# Patient Record
Sex: FEMALE | Race: OTHER | NOT HISPANIC OR LATINO | Employment: FULL TIME | RURAL
[De-identification: names, ages, dates, MRNs, and addresses within clinical notes are randomized per-mention and may not be internally consistent; named-entity substitution may affect disease eponyms.]

---

## 2020-05-31 ENCOUNTER — HISTORICAL (OUTPATIENT)
Dept: ADMINISTRATIVE | Facility: HOSPITAL | Age: 40
End: 2020-05-31

## 2021-05-04 RX ORDER — ERTUGLIFLOZIN 5 MG/1
5 TABLET, FILM COATED ORAL DAILY
COMMUNITY
End: 2021-09-22

## 2021-05-04 RX ORDER — LIRAGLUTIDE 6 MG/ML
1.8 INJECTION SUBCUTANEOUS DAILY
COMMUNITY
End: 2021-11-09

## 2021-05-04 RX ORDER — IBUPROFEN 600 MG/1
600 TABLET ORAL 3 TIMES DAILY
COMMUNITY
End: 2023-02-22

## 2021-05-04 RX ORDER — LOSARTAN POTASSIUM 50 MG/1
50 TABLET ORAL DAILY
COMMUNITY
End: 2021-09-22

## 2021-09-22 ENCOUNTER — OFFICE VISIT (OUTPATIENT)
Dept: DIABETES SERVICES | Facility: CLINIC | Age: 41
End: 2021-09-22
Payer: COMMERCIAL

## 2021-09-22 VITALS
BODY MASS INDEX: 47.09 KG/M2 | DIASTOLIC BLOOD PRESSURE: 106 MMHG | HEIGHT: 66 IN | OXYGEN SATURATION: 98 % | WEIGHT: 293 LBS | RESPIRATION RATE: 16 BRPM | SYSTOLIC BLOOD PRESSURE: 136 MMHG | HEART RATE: 93 BPM

## 2021-09-22 DIAGNOSIS — E11.9 TYPE 2 DIABETES MELLITUS WITHOUT COMPLICATION, WITHOUT LONG-TERM CURRENT USE OF INSULIN: Primary | ICD-10-CM

## 2021-09-22 DIAGNOSIS — I10 ESSENTIAL HYPERTENSION: ICD-10-CM

## 2021-09-22 LAB
GLUCOSE SERPL-MCNC: 204 MG/DL (ref 70–110)
HBA1C MFR BLD: 8.9 % (ref 4.5–6.6)

## 2021-09-22 PROCEDURE — 82962 GLUCOSE BLOOD TEST: CPT | Mod: PBBFAC | Performed by: NURSE PRACTITIONER

## 2021-09-22 PROCEDURE — 99213 PR OFFICE/OUTPT VISIT, EST, LEVL III, 20-29 MIN: ICD-10-PCS | Mod: S$PBB,,, | Performed by: NURSE PRACTITIONER

## 2021-09-22 PROCEDURE — 99213 OFFICE O/P EST LOW 20 MIN: CPT | Mod: S$PBB,,, | Performed by: NURSE PRACTITIONER

## 2021-09-22 PROCEDURE — 83036 HEMOGLOBIN GLYCOSYLATED A1C: CPT | Mod: PBBFAC | Performed by: NURSE PRACTITIONER

## 2021-09-22 PROCEDURE — 99214 OFFICE O/P EST MOD 30 MIN: CPT | Mod: PBBFAC | Performed by: NURSE PRACTITIONER

## 2021-09-22 RX ORDER — EMPAGLIFLOZIN 25 MG/1
25 TABLET, FILM COATED ORAL DAILY
Qty: 90 TABLET | Refills: 1 | Status: SHIPPED | OUTPATIENT
Start: 2021-09-22 | End: 2021-12-22

## 2021-09-22 RX ORDER — FLUCONAZOLE 100 MG/1
TABLET ORAL
Qty: 4 TABLET | Refills: 0 | Status: SHIPPED | OUTPATIENT
Start: 2021-09-22 | End: 2021-12-22

## 2021-09-22 RX ORDER — LOSARTAN POTASSIUM 100 MG/1
100 TABLET ORAL DAILY
Qty: 90 TABLET | Refills: 3 | Status: SHIPPED | OUTPATIENT
Start: 2021-09-22 | End: 2022-02-17 | Stop reason: SDUPTHER

## 2021-09-23 RX ORDER — ATORVASTATIN CALCIUM 20 MG/1
20 TABLET, FILM COATED ORAL DAILY
Qty: 90 TABLET | Refills: 3 | Status: SHIPPED | OUTPATIENT
Start: 2021-09-23 | End: 2021-12-22 | Stop reason: SDUPTHER

## 2021-12-22 ENCOUNTER — OFFICE VISIT (OUTPATIENT)
Dept: DIABETES SERVICES | Facility: CLINIC | Age: 41
End: 2021-12-22
Payer: COMMERCIAL

## 2021-12-22 VITALS
SYSTOLIC BLOOD PRESSURE: 144 MMHG | RESPIRATION RATE: 16 BRPM | BODY MASS INDEX: 47.09 KG/M2 | HEART RATE: 97 BPM | DIASTOLIC BLOOD PRESSURE: 102 MMHG | OXYGEN SATURATION: 98 % | WEIGHT: 293 LBS | HEIGHT: 66 IN

## 2021-12-22 DIAGNOSIS — I10 PRIMARY HYPERTENSION: ICD-10-CM

## 2021-12-22 DIAGNOSIS — E11.65 TYPE 2 DIABETES MELLITUS WITH HYPERGLYCEMIA, WITHOUT LONG-TERM CURRENT USE OF INSULIN: Primary | ICD-10-CM

## 2021-12-22 DIAGNOSIS — E78.5 HYPERLIPIDEMIA, UNSPECIFIED HYPERLIPIDEMIA TYPE: ICD-10-CM

## 2021-12-22 DIAGNOSIS — F32.A DEPRESSION, UNSPECIFIED DEPRESSION TYPE: ICD-10-CM

## 2021-12-22 LAB
GLUCOSE SERPL-MCNC: 277 MG/DL (ref 70–110)
HBA1C MFR BLD: 10.7 % (ref 4.5–6.6)

## 2021-12-22 PROCEDURE — 3061F PR NEG MICROALBUMINURIA RESULT DOCUMENTED/REVIEW: ICD-10-PCS | Mod: CPTII,,, | Performed by: NURSE PRACTITIONER

## 2021-12-22 PROCEDURE — 3061F NEG MICROALBUMINURIA REV: CPT | Mod: CPTII,,, | Performed by: NURSE PRACTITIONER

## 2021-12-22 PROCEDURE — 4010F PR ACE/ARB THEARPY RXD/TAKEN: ICD-10-PCS | Mod: CPTII,,, | Performed by: NURSE PRACTITIONER

## 2021-12-22 PROCEDURE — 3066F PR DOCUMENTATION OF TREATMENT FOR NEPHROPATHY: ICD-10-PCS | Mod: CPTII,,, | Performed by: NURSE PRACTITIONER

## 2021-12-22 PROCEDURE — 99214 PR OFFICE/OUTPT VISIT, EST, LEVL IV, 30-39 MIN: ICD-10-PCS | Mod: S$PBB,,, | Performed by: NURSE PRACTITIONER

## 2021-12-22 PROCEDURE — 99214 OFFICE O/P EST MOD 30 MIN: CPT | Mod: PBBFAC | Performed by: NURSE PRACTITIONER

## 2021-12-22 PROCEDURE — 3066F NEPHROPATHY DOC TX: CPT | Mod: CPTII,,, | Performed by: NURSE PRACTITIONER

## 2021-12-22 PROCEDURE — 99214 OFFICE O/P EST MOD 30 MIN: CPT | Mod: S$PBB,,, | Performed by: NURSE PRACTITIONER

## 2021-12-22 PROCEDURE — 83036 HEMOGLOBIN GLYCOSYLATED A1C: CPT | Mod: PBBFAC | Performed by: NURSE PRACTITIONER

## 2021-12-22 PROCEDURE — 4010F ACE/ARB THERAPY RXD/TAKEN: CPT | Mod: CPTII,,, | Performed by: NURSE PRACTITIONER

## 2021-12-22 PROCEDURE — 82962 GLUCOSE BLOOD TEST: CPT | Mod: PBBFAC | Performed by: NURSE PRACTITIONER

## 2021-12-22 RX ORDER — LIRAGLUTIDE 6 MG/ML
INJECTION SUBCUTANEOUS
Qty: 27 ML | Refills: 3 | Status: SHIPPED | OUTPATIENT
Start: 2021-12-22 | End: 2022-02-17

## 2021-12-22 RX ORDER — ATORVASTATIN CALCIUM 20 MG/1
20 TABLET, FILM COATED ORAL DAILY
Qty: 90 TABLET | Refills: 3 | Status: SHIPPED | OUTPATIENT
Start: 2021-12-22 | End: 2022-02-17 | Stop reason: SDUPTHER

## 2021-12-22 RX ORDER — INSULIN GLARGINE 100 [IU]/ML
INJECTION, SOLUTION SUBCUTANEOUS
Qty: 15 ML | Refills: 1 | Status: SHIPPED | OUTPATIENT
Start: 2021-12-22 | End: 2022-02-17

## 2021-12-22 RX ORDER — BUSPIRONE HYDROCHLORIDE 10 MG/1
10 TABLET ORAL 3 TIMES DAILY
Qty: 90 TABLET | Refills: 11 | Status: SHIPPED | OUTPATIENT
Start: 2021-12-22 | End: 2022-02-17 | Stop reason: SDUPTHER

## 2021-12-22 RX ORDER — ALCOHOL ANTISEPTIC PADS
PADS, MEDICATED (EA) TOPICAL
Qty: 200 EACH | Refills: 3 | Status: SHIPPED | OUTPATIENT
Start: 2021-12-22 | End: 2022-02-14 | Stop reason: SDUPTHER

## 2022-02-14 ENCOUNTER — OFFICE VISIT (OUTPATIENT)
Dept: DIABETES SERVICES | Facility: CLINIC | Age: 42
End: 2022-02-14
Payer: COMMERCIAL

## 2022-02-14 VITALS
RESPIRATION RATE: 16 BRPM | OXYGEN SATURATION: 98 % | SYSTOLIC BLOOD PRESSURE: 146 MMHG | HEART RATE: 96 BPM | BODY MASS INDEX: 47.09 KG/M2 | DIASTOLIC BLOOD PRESSURE: 96 MMHG | HEIGHT: 66 IN | WEIGHT: 293 LBS

## 2022-02-14 DIAGNOSIS — E78.5 HYPERLIPIDEMIA, UNSPECIFIED HYPERLIPIDEMIA TYPE: ICD-10-CM

## 2022-02-14 DIAGNOSIS — E11.9 TYPE 2 DIABETES MELLITUS WITHOUT COMPLICATION, WITH LONG-TERM CURRENT USE OF INSULIN: Primary | ICD-10-CM

## 2022-02-14 DIAGNOSIS — Z79.4 TYPE 2 DIABETES MELLITUS WITHOUT COMPLICATION, WITH LONG-TERM CURRENT USE OF INSULIN: Primary | ICD-10-CM

## 2022-02-14 DIAGNOSIS — I10 PRIMARY HYPERTENSION: ICD-10-CM

## 2022-02-14 DIAGNOSIS — E11.65 TYPE 2 DIABETES MELLITUS WITH HYPERGLYCEMIA, WITHOUT LONG-TERM CURRENT USE OF INSULIN: ICD-10-CM

## 2022-02-14 LAB — GLUCOSE SERPL-MCNC: 295 MG/DL (ref 70–110)

## 2022-02-14 PROCEDURE — 82962 GLUCOSE BLOOD TEST: CPT | Mod: PBBFAC | Performed by: NURSE PRACTITIONER

## 2022-02-14 PROCEDURE — 1160F RVW MEDS BY RX/DR IN RCRD: CPT | Mod: CPTII,,, | Performed by: NURSE PRACTITIONER

## 2022-02-14 PROCEDURE — 3008F BODY MASS INDEX DOCD: CPT | Mod: CPTII,,, | Performed by: NURSE PRACTITIONER

## 2022-02-14 PROCEDURE — 99214 OFFICE O/P EST MOD 30 MIN: CPT | Mod: S$PBB,,, | Performed by: NURSE PRACTITIONER

## 2022-02-14 PROCEDURE — 1160F PR REVIEW ALL MEDS BY PRESCRIBER/CLIN PHARMACIST DOCUMENTED: ICD-10-PCS | Mod: CPTII,,, | Performed by: NURSE PRACTITIONER

## 2022-02-14 PROCEDURE — 3080F DIAST BP >= 90 MM HG: CPT | Mod: CPTII,,, | Performed by: NURSE PRACTITIONER

## 2022-02-14 PROCEDURE — 99214 OFFICE O/P EST MOD 30 MIN: CPT | Mod: PBBFAC | Performed by: NURSE PRACTITIONER

## 2022-02-14 PROCEDURE — 1159F PR MEDICATION LIST DOCUMENTED IN MEDICAL RECORD: ICD-10-PCS | Mod: CPTII,,, | Performed by: NURSE PRACTITIONER

## 2022-02-14 PROCEDURE — 1159F MED LIST DOCD IN RCRD: CPT | Mod: CPTII,,, | Performed by: NURSE PRACTITIONER

## 2022-02-14 PROCEDURE — 3077F SYST BP >= 140 MM HG: CPT | Mod: CPTII,,, | Performed by: NURSE PRACTITIONER

## 2022-02-14 PROCEDURE — 3077F PR MOST RECENT SYSTOLIC BLOOD PRESSURE >= 140 MM HG: ICD-10-PCS | Mod: CPTII,,, | Performed by: NURSE PRACTITIONER

## 2022-02-14 PROCEDURE — 3080F PR MOST RECENT DIASTOLIC BLOOD PRESSURE >= 90 MM HG: ICD-10-PCS | Mod: CPTII,,, | Performed by: NURSE PRACTITIONER

## 2022-02-14 PROCEDURE — 99214 PR OFFICE/OUTPT VISIT, EST, LEVL IV, 30-39 MIN: ICD-10-PCS | Mod: S$PBB,,, | Performed by: NURSE PRACTITIONER

## 2022-02-14 PROCEDURE — 3008F PR BODY MASS INDEX (BMI) DOCUMENTED: ICD-10-PCS | Mod: CPTII,,, | Performed by: NURSE PRACTITIONER

## 2022-02-14 RX ORDER — ALCOHOL ANTISEPTIC PADS
PADS, MEDICATED (EA) TOPICAL
Qty: 200 EACH | Refills: 3 | Status: SHIPPED | OUTPATIENT
Start: 2022-02-14 | End: 2023-10-19 | Stop reason: SDUPTHER

## 2022-02-14 RX ORDER — AMLODIPINE BESYLATE 5 MG/1
5 TABLET ORAL DAILY
Qty: 90 TABLET | Refills: 1 | Status: SHIPPED | OUTPATIENT
Start: 2022-02-14 | End: 2022-02-17 | Stop reason: SDUPTHER

## 2022-02-14 RX ORDER — GLIPIZIDE 10 MG/1
10 TABLET, FILM COATED, EXTENDED RELEASE ORAL
Qty: 90 TABLET | Refills: 1 | Status: SHIPPED | OUTPATIENT
Start: 2022-02-14 | End: 2022-02-14

## 2022-02-14 NOTE — PROGRESS NOTES
Subjective:       Patient ID: Ashly Trent is a 41 y.o. female.    Chief Complaint: No chief complaint on file.    Here for follow up.  Advised to follow up in 6 weeks as we changed meds due to formulary as well as increased losartan due to poorly controlled blood pressure.    Review of Systems   Constitutional: Negative for activity change, appetite change, diaphoresis and fatigue.   HENT: Negative for nasal congestion, facial swelling and sinus pressure/congestion.    Eyes: Negative for visual disturbance.   Respiratory: Negative for shortness of breath and wheezing.    Cardiovascular: Negative for chest pain and leg swelling.   Gastrointestinal: Negative for constipation, diarrhea, nausea and vomiting.   Endocrine: Negative for polydipsia, polyphagia and polyuria.   Genitourinary: Negative for dysuria, frequency and urgency.   Musculoskeletal: Negative for gait problem and myalgias.   Integumentary:  Negative for color change, rash and wound.   Neurological: Negative for dizziness, syncope, weakness, headaches, disturbances in coordination and coordination difficulties.   Hematological: Does not bruise/bleed easily.   Psychiatric/Behavioral: Negative for self-injury, sleep disturbance and suicidal ideas. The patient is not nervous/anxious.          Objective:      Physical Exam  Vitals and nursing note reviewed.   Constitutional:       Appearance: Normal appearance.   HENT:      Head: Normocephalic.   Cardiovascular:      Rate and Rhythm: Normal rate.      Pulses:           Dorsalis pedis pulses are 3+ on the right side and 3+ on the left side.        Posterior tibial pulses are 3+ on the right side and 3+ on the left side.   Pulmonary:      Effort: Pulmonary effort is normal.   Musculoskeletal:         General: Normal range of motion.      Right foot: Normal range of motion. No deformity.      Left foot: Normal range of motion. No deformity.   Feet:      Right foot:      Skin integrity: Skin integrity normal.  No ulcer or callus.      Toenail Condition: Right toenails are normal.      Left foot:      Skin integrity: Skin integrity normal. No ulcer or callus.      Toenail Condition: Left toenails are normal.   Skin:     General: Skin is warm and dry.   Neurological:      General: No focal deficit present.      Mental Status: She is alert and oriented to person, place, and time.   Psychiatric:         Mood and Affect: Mood normal.         Behavior: Behavior normal.         Thought Content: Thought content normal.         Judgment: Judgment normal.         Assessment:       Problem List Items Addressed This Visit        Cardiac/Vascular    HTN (hypertension)    Hyperlipidemia       Endocrine    Diabetes mellitus, type 2 - Primary          Plan:       Problem List Items Addressed This Visit        Cardiac/Vascular    HTN (hypertension)    Hyperlipidemia       Endocrine    Diabetes mellitus, type 2 - Primary             We will get appt with [facility] provider to establish and obtain insulin and ozempic in the place of [facility].

## 2022-02-14 NOTE — PATIENT INSTRUCTIONS
Continue to monitor and document blood pressure and glucose as directed.      Advised to continue to monitor and document blood pressure at home, bring in log to next visit.  DASH encouraged.  Take all medications as directed.   Pt is advised to monitor and document glucose fasting when you wake up before you eat and 2 hours after meal and bring in meter to next visit.      Ensure to take medications as directed.      Follow diabetic diet as directed.      Work to achieve normal body weight.     Ensure to exercise 4-5 times per week for 20 minutes. Snacks with 0-5 grams carbs   Hard Boiled Egg   Crystal Light, Vitamin Water, Powerade Zero   Herbal tea, unsweetened   8 oz unsweetened almond milk   2 tsp peanut butter on celery   ½ cup sugar-free Jell-O   1 sugar-free popsicle   Non starchy vegetables such as carrots or celery sticks with lowfat dressing   ½ oz lowfat cheese or string cheese   1 closed handful of nuts or tbsp of seeds, unsalted    Snacks with 15 gram carbs  . 1 small piece of fruit or . banana or . cup light canned fruit  . 3 aftab cracker squares  . 3 cups popcorn  . 5 Vanilla Wafers  . 1/2 cup low fat, no added sugar ice cream or frozen yogurt  . 1/2 turkey, ham, or chicken sandwich  . 1.2 cup fruit with 1/2 cup of cottage cheese  . 4-6 unsalted wheat crackers with 1 oz low fat cheese or 1 tbsp peanut butter  . 30 goldfish crackers  . 7-8 mini rice cakes  . 1/3 cup hummus dip with raw vegetables  . 1/2 whole wheat lorraine, 1 tbsp hummus  . Mini pizza (. whole wheat English muffin, low-fat cheese, tomato sauce)  . 100 calorie snack pack  . 4-6 oz light yogurt  . 1/2 cup sugar-free pudding    ozempic 0.25mg once weekly for 2 weeks then increase to 0.5 mg once weekly.  The sample will last 5 weeks.     Use tresiba sample in the place of basaglar.     Start [facility].

## 2022-02-17 ENCOUNTER — OFFICE VISIT (OUTPATIENT)
Dept: PRIMARY CARE CLINIC | Facility: CLINIC | Age: 42
End: 2022-02-17
Payer: COMMERCIAL

## 2022-02-17 VITALS
HEART RATE: 96 BPM | RESPIRATION RATE: 20 BRPM | WEIGHT: 293 LBS | SYSTOLIC BLOOD PRESSURE: 128 MMHG | BODY MASS INDEX: 47.09 KG/M2 | TEMPERATURE: 99 F | DIASTOLIC BLOOD PRESSURE: 80 MMHG | OXYGEN SATURATION: 97 % | HEIGHT: 66 IN

## 2022-02-17 DIAGNOSIS — E11.65 TYPE 2 DIABETES MELLITUS WITH HYPERGLYCEMIA, WITHOUT LONG-TERM CURRENT USE OF INSULIN: ICD-10-CM

## 2022-02-17 DIAGNOSIS — Z00.01 ENCOUNTER FOR GENERAL ADULT MEDICAL EXAMINATION WITH ABNORMAL FINDINGS: Primary | ICD-10-CM

## 2022-02-17 DIAGNOSIS — Z13.220 ENCOUNTER FOR SCREENING FOR LIPOID DISORDERS: ICD-10-CM

## 2022-02-17 DIAGNOSIS — E11.9 DIABETES MELLITUS WITHOUT COMPLICATION: ICD-10-CM

## 2022-02-17 DIAGNOSIS — Z13.1 SCREENING FOR DIABETES MELLITUS: ICD-10-CM

## 2022-02-17 DIAGNOSIS — F32.A DEPRESSION, UNSPECIFIED DEPRESSION TYPE: ICD-10-CM

## 2022-02-17 DIAGNOSIS — I10 ESSENTIAL HYPERTENSION, BENIGN: ICD-10-CM

## 2022-02-17 PROCEDURE — 3079F PR MOST RECENT DIASTOLIC BLOOD PRESSURE 80-89 MM HG: ICD-10-PCS | Mod: ,,, | Performed by: NURSE PRACTITIONER

## 2022-02-17 PROCEDURE — 1160F RVW MEDS BY RX/DR IN RCRD: CPT | Mod: ,,, | Performed by: NURSE PRACTITIONER

## 2022-02-17 PROCEDURE — 99385 PREV VISIT NEW AGE 18-39: CPT | Mod: ,,, | Performed by: NURSE PRACTITIONER

## 2022-02-17 PROCEDURE — 3079F DIAST BP 80-89 MM HG: CPT | Mod: ,,, | Performed by: NURSE PRACTITIONER

## 2022-02-17 PROCEDURE — 4010F ACE/ARB THERAPY RXD/TAKEN: CPT | Mod: ,,, | Performed by: NURSE PRACTITIONER

## 2022-02-17 PROCEDURE — 3008F PR BODY MASS INDEX (BMI) DOCUMENTED: ICD-10-PCS | Mod: ,,, | Performed by: NURSE PRACTITIONER

## 2022-02-17 PROCEDURE — 3008F BODY MASS INDEX DOCD: CPT | Mod: ,,, | Performed by: NURSE PRACTITIONER

## 2022-02-17 PROCEDURE — 99385 PR PREVENTIVE VISIT,NEW,18-39: ICD-10-PCS | Mod: ,,, | Performed by: NURSE PRACTITIONER

## 2022-02-17 PROCEDURE — 3074F SYST BP LT 130 MM HG: CPT | Mod: ,,, | Performed by: NURSE PRACTITIONER

## 2022-02-17 PROCEDURE — 1159F MED LIST DOCD IN RCRD: CPT | Mod: ,,, | Performed by: NURSE PRACTITIONER

## 2022-02-17 PROCEDURE — 3074F PR MOST RECENT SYSTOLIC BLOOD PRESSURE < 130 MM HG: ICD-10-PCS | Mod: ,,, | Performed by: NURSE PRACTITIONER

## 2022-02-17 PROCEDURE — 1160F PR REVIEW ALL MEDS BY PRESCRIBER/CLIN PHARMACIST DOCUMENTED: ICD-10-PCS | Mod: ,,, | Performed by: NURSE PRACTITIONER

## 2022-02-17 PROCEDURE — 1159F PR MEDICATION LIST DOCUMENTED IN MEDICAL RECORD: ICD-10-PCS | Mod: ,,, | Performed by: NURSE PRACTITIONER

## 2022-02-17 PROCEDURE — 4010F PR ACE/ARB THEARPY RXD/TAKEN: ICD-10-PCS | Mod: ,,, | Performed by: NURSE PRACTITIONER

## 2022-02-17 RX ORDER — INSULIN DEGLUDEC 100 U/ML
32 INJECTION, SOLUTION SUBCUTANEOUS DAILY
Qty: 10 PEN | Refills: 3 | Status: SHIPPED | OUTPATIENT
Start: 2022-02-17 | End: 2022-08-22 | Stop reason: CLARIF

## 2022-02-17 RX ORDER — BUSPIRONE HYDROCHLORIDE 10 MG/1
10 TABLET ORAL 3 TIMES DAILY
Qty: 270 TABLET | Refills: 3 | Status: SHIPPED | OUTPATIENT
Start: 2022-02-17 | End: 2023-02-22 | Stop reason: SDUPTHER

## 2022-02-17 RX ORDER — ATORVASTATIN CALCIUM 20 MG/1
20 TABLET, FILM COATED ORAL DAILY
Qty: 90 TABLET | Refills: 3 | Status: SHIPPED | OUTPATIENT
Start: 2022-02-17 | End: 2023-02-22 | Stop reason: SDUPTHER

## 2022-02-17 RX ORDER — SEMAGLUTIDE 1.34 MG/ML
0.5 INJECTION, SOLUTION SUBCUTANEOUS
Qty: 3 PEN | Refills: 3 | Status: SHIPPED | OUTPATIENT
Start: 2022-02-17 | End: 2023-02-22 | Stop reason: SDUPTHER

## 2022-02-17 RX ORDER — AMLODIPINE BESYLATE 5 MG/1
5 TABLET ORAL DAILY
Qty: 90 TABLET | Refills: 3 | Status: SHIPPED | OUTPATIENT
Start: 2022-02-17 | End: 2023-02-22 | Stop reason: SDUPTHER

## 2022-02-17 RX ORDER — LOSARTAN POTASSIUM 100 MG/1
100 TABLET ORAL DAILY
Qty: 90 TABLET | Refills: 3 | Status: SHIPPED | OUTPATIENT
Start: 2022-02-17 | End: 2023-02-22 | Stop reason: SDUPTHER

## 2022-02-17 NOTE — PROGRESS NOTES
Subjective:       Patient ID: Ashly Trent is a 41 y.o. female.    Chief Complaint: Annual Exam    Pt presents for a wellness visit with [facility] med refills. Last pap was in Nov 2021 and it was normal. Pt is fasting.     Review of Systems   Constitutional: Negative for activity change, appetite change, chills, fatigue and fever.   HENT: Negative for nasal congestion, ear discharge, nosebleeds, postnasal drip, rhinorrhea, sinus pressure/congestion, sneezing, sore throat and tinnitus.    Eyes: Negative for pain, discharge, redness and itching.   Respiratory: Negative for cough, choking, chest tightness, shortness of breath and wheezing.    Cardiovascular: Negative for chest pain.   Gastrointestinal: Negative for abdominal distention, abdominal pain, blood in stool, change in bowel habit, constipation, diarrhea, nausea, vomiting and change in bowel habit.   Genitourinary: Negative for decreased urine volume, dysuria, flank pain, frequency, menstrual irregularity and menstrual problem.   Musculoskeletal: Negative for back pain and gait problem.   Integumentary:  Negative for wound, breast mass and breast discharge.   Allergic/Immunologic: Negative for immunocompromised state.   Neurological: Negative for dizziness, light-headedness and headaches.   Psychiatric/Behavioral: Negative for agitation, behavioral problems and hallucinations.   Breast: Negative for mass        Objective:      Physical Exam  Vitals and nursing note reviewed.   Constitutional:       Appearance: Normal appearance.   Cardiovascular:      Rate and Rhythm: Normal rate and regular rhythm.      Heart sounds: Normal heart sounds.   Pulmonary:      Effort: Pulmonary effort is normal.      Breath sounds: Normal breath sounds.   Musculoskeletal:         General: Normal range of motion.   Feet:      Right foot:      Protective Sensation: 10 sites tested. 10 sites sensed.      Left foot:      Protective Sensation: 10 sites tested. 8 sites sensed.       Comments: Gave instruction on visualizing bottoms of feet daily   Neurological:      Mental Status: She is alert and oriented to person, place, and time.   Psychiatric:         Behavior: Behavior normal.         Assessment:       Problem List Items Addressed This Visit        Psychiatric    Depression    Relevant Medications    busPIRone (BUSPAR) 10 MG tablet       Endocrine    Diabetes mellitus, type 2    Relevant Medications    insulin degludec (TRESIBA FLEXTOUCH U-100) 100 unit/mL (3 mL) insulin pen    semaglutide (OZEMPIC) 0.25 mg or 0.5 mg(2 mg/1.5 mL) pen injector    atorvastatin (LIPITOR) 20 MG tablet      Other Visit Diagnoses     Encounter for general adult medical examination with abnormal findings    -  Primary    Diabetes mellitus without complication        Relevant Medications    insulin degludec (TRESIBA FLEXTOUCH U-100) 100 unit/mL (3 mL) insulin pen    semaglutide (OZEMPIC) 0.25 mg or 0.5 mg(2 mg/1.5 mL) pen injector    Other Relevant Orders    CBC Auto Differential    Comprehensive Metabolic Panel    TSH    Encounter for screening for lipoid disorders        Screening for diabetes mellitus        Relevant Orders    Lipid Panel    BMI 45.0-49.9, adult        Essential hypertension, benign        Relevant Medications    losartan (COZAAR) 100 MG tablet    amLODIPine (NORVASC) 5 MG tablet          Plan:       Will call pt with lab results. Discussed a diabetic diet and exercise 3-5 times weekly with pt.

## 2022-05-18 ENCOUNTER — OFFICE VISIT (OUTPATIENT)
Dept: DIABETES SERVICES | Facility: CLINIC | Age: 42
End: 2022-05-18
Payer: COMMERCIAL

## 2022-05-18 VITALS
HEIGHT: 66 IN | DIASTOLIC BLOOD PRESSURE: 92 MMHG | BODY MASS INDEX: 47.09 KG/M2 | RESPIRATION RATE: 16 BRPM | WEIGHT: 293 LBS | SYSTOLIC BLOOD PRESSURE: 136 MMHG | OXYGEN SATURATION: 98 % | HEART RATE: 94 BPM

## 2022-05-18 DIAGNOSIS — I10 PRIMARY HYPERTENSION: ICD-10-CM

## 2022-05-18 DIAGNOSIS — E78.5 HYPERLIPIDEMIA, UNSPECIFIED HYPERLIPIDEMIA TYPE: ICD-10-CM

## 2022-05-18 DIAGNOSIS — Z79.4 TYPE 2 DIABETES MELLITUS WITH HYPERGLYCEMIA, WITH LONG-TERM CURRENT USE OF INSULIN: Primary | ICD-10-CM

## 2022-05-18 DIAGNOSIS — E11.65 TYPE 2 DIABETES MELLITUS WITH HYPERGLYCEMIA, WITH LONG-TERM CURRENT USE OF INSULIN: Primary | ICD-10-CM

## 2022-05-18 LAB
GLUCOSE SERPL-MCNC: 214 MG/DL (ref 70–110)
HBA1C MFR BLD: 10.1 % (ref 4.5–6.6)

## 2022-05-18 PROCEDURE — 1159F PR MEDICATION LIST DOCUMENTED IN MEDICAL RECORD: ICD-10-PCS | Mod: CPTII,,, | Performed by: NURSE PRACTITIONER

## 2022-05-18 PROCEDURE — 82962 GLUCOSE BLOOD TEST: CPT | Mod: PBBFAC | Performed by: NURSE PRACTITIONER

## 2022-05-18 PROCEDURE — 1160F PR REVIEW ALL MEDS BY PRESCRIBER/CLIN PHARMACIST DOCUMENTED: ICD-10-PCS | Mod: CPTII,,, | Performed by: NURSE PRACTITIONER

## 2022-05-18 PROCEDURE — 1159F MED LIST DOCD IN RCRD: CPT | Mod: CPTII,,, | Performed by: NURSE PRACTITIONER

## 2022-05-18 PROCEDURE — 3075F SYST BP GE 130 - 139MM HG: CPT | Mod: CPTII,,, | Performed by: NURSE PRACTITIONER

## 2022-05-18 PROCEDURE — 3008F BODY MASS INDEX DOCD: CPT | Mod: CPTII,,, | Performed by: NURSE PRACTITIONER

## 2022-05-18 PROCEDURE — 4010F ACE/ARB THERAPY RXD/TAKEN: CPT | Mod: CPTII,,, | Performed by: NURSE PRACTITIONER

## 2022-05-18 PROCEDURE — 3075F PR MOST RECENT SYSTOLIC BLOOD PRESS GE 130-139MM HG: ICD-10-PCS | Mod: CPTII,,, | Performed by: NURSE PRACTITIONER

## 2022-05-18 PROCEDURE — 99213 PR OFFICE/OUTPT VISIT, EST, LEVL III, 20-29 MIN: ICD-10-PCS | Mod: S$PBB,,, | Performed by: NURSE PRACTITIONER

## 2022-05-18 PROCEDURE — 99213 OFFICE O/P EST LOW 20 MIN: CPT | Mod: S$PBB,,, | Performed by: NURSE PRACTITIONER

## 2022-05-18 PROCEDURE — 3080F DIAST BP >= 90 MM HG: CPT | Mod: CPTII,,, | Performed by: NURSE PRACTITIONER

## 2022-05-18 PROCEDURE — 99214 OFFICE O/P EST MOD 30 MIN: CPT | Mod: PBBFAC | Performed by: NURSE PRACTITIONER

## 2022-05-18 PROCEDURE — 3008F PR BODY MASS INDEX (BMI) DOCUMENTED: ICD-10-PCS | Mod: CPTII,,, | Performed by: NURSE PRACTITIONER

## 2022-05-18 PROCEDURE — 1160F RVW MEDS BY RX/DR IN RCRD: CPT | Mod: CPTII,,, | Performed by: NURSE PRACTITIONER

## 2022-05-18 PROCEDURE — 83036 HEMOGLOBIN GLYCOSYLATED A1C: CPT | Mod: PBBFAC | Performed by: NURSE PRACTITIONER

## 2022-05-18 PROCEDURE — 3080F PR MOST RECENT DIASTOLIC BLOOD PRESSURE >= 90 MM HG: ICD-10-PCS | Mod: CPTII,,, | Performed by: NURSE PRACTITIONER

## 2022-05-18 PROCEDURE — 4010F PR ACE/ARB THEARPY RXD/TAKEN: ICD-10-PCS | Mod: CPTII,,, | Performed by: NURSE PRACTITIONER

## 2022-05-18 RX ORDER — ACETAMINOPHEN AND CODEINE PHOSPHATE 120; 12 MG/5ML; MG/5ML
SOLUTION ORAL
COMMUNITY
Start: 2022-02-22

## 2022-05-18 RX ORDER — DAPAGLIFLOZIN 10 MG/1
10 TABLET, FILM COATED ORAL DAILY
COMMUNITY
End: 2023-02-22 | Stop reason: SDUPTHER

## 2022-05-18 RX ORDER — FLUCONAZOLE 100 MG/1
100 TABLET ORAL DAILY
Qty: 4 TABLET | Refills: 0 | Status: SHIPPED | OUTPATIENT
Start: 2022-05-18 | End: 2022-06-17

## 2022-05-18 NOTE — PATIENT INSTRUCTIONS
Increase water intake to over 80 oz  Start farxiga and can take diflucan as needed to prevent yeast infection  Continue ozempic  Continue insulin  Exercise 5 days a week and eat no more than 20 carb per meal, keep food log.

## 2022-05-18 NOTE — PROGRESS NOTES
Subjective:       Patient ID: Ashly Trent is a 41 y.o. female.    Chief Complaint: Diabetes Mellitus (Pt here for follow up visit and A1c, denies complaints, checks sugar BID.  )    Here today for routine evaluation and med refill.  Her a1c is down slightly 10.7 to 10.1.  She has taken jardiance in the past and had yeast infection.     Review of Systems   Constitutional: Negative for activity change, appetite change, diaphoresis and fatigue.   HENT: Negative for nasal congestion, facial swelling and sinus pressure/congestion.    Eyes: Negative for visual disturbance.   Respiratory: Negative for shortness of breath and wheezing.    Cardiovascular: Negative for chest pain and leg swelling.   Gastrointestinal: Negative for constipation, diarrhea, nausea and vomiting.   Endocrine: Negative for polydipsia, polyphagia and polyuria.   Genitourinary: Negative for dysuria, frequency and urgency.   Musculoskeletal: Negative for gait problem and myalgias.   Integumentary:  Negative for color change, rash and wound.   Neurological: Negative for dizziness, syncope, weakness, headaches, disturbances in coordination and coordination difficulties.   Hematological: Does not bruise/bleed easily.   Psychiatric/Behavioral: Negative for self-injury, sleep disturbance and suicidal ideas. The patient is not nervous/anxious.          Objective:      Physical Exam  Vitals and nursing note reviewed.   Constitutional:       Appearance: Normal appearance.   HENT:      Head: Normocephalic.   Cardiovascular:      Rate and Rhythm: Normal rate.      Pulses:           Dorsalis pedis pulses are 3+ on the right side and 3+ on the left side.        Posterior tibial pulses are 3+ on the right side and 3+ on the left side.   Pulmonary:      Effort: Pulmonary effort is normal.   Musculoskeletal:         General: Normal range of motion.      Right foot: Normal range of motion. No deformity.      Left foot: Normal range of motion. No deformity.   Feet:       Right foot:      Skin integrity: Skin integrity normal. No ulcer or callus.      Toenail Condition: Right toenails are normal.      Left foot:      Skin integrity: Skin integrity normal. No ulcer or callus.      Toenail Condition: Left toenails are normal.   Skin:     General: Skin is warm and dry.   Neurological:      General: No focal deficit present.      Mental Status: She is alert and oriented to person, place, and time.   Psychiatric:         Mood and Affect: Mood normal.         Behavior: Behavior normal.         Thought Content: Thought content normal.         Judgment: Judgment normal.         Assessment:       Problem List Items Addressed This Visit        Cardiac/Vascular    HTN (hypertension)    Hyperlipidemia       Endocrine    Diabetes mellitus, type 2 - Primary    Relevant Orders    Hemoglobin A1C, POCT (Completed)    POCT Glucose, Hand-Held Device (Completed)          Plan:       Problem List Items Addressed This Visit        Cardiac/Vascular    HTN (hypertension)    Hyperlipidemia       Endocrine    Diabetes mellitus, type 2 - Primary    Relevant Orders    Hemoglobin A1C, POCT (Completed)    POCT Glucose, Hand-Held Device (Completed)        Increase water intake to over 80 oz  Start farxiga and can take diflucan as needed to prevent yeast infection  Continue ozempic  Continue insulin  Exercise 5 days a week and eat no more than 20 carb per meal, keep food log.

## 2022-05-25 ENCOUNTER — TELEPHONE (OUTPATIENT)
Dept: DIABETES SERVICES | Facility: CLINIC | Age: 42
End: 2022-05-25

## 2022-05-25 RX ORDER — DAPAGLIFLOZIN 10 MG/1
10 TABLET, FILM COATED ORAL DAILY
Status: CANCELLED | OUTPATIENT
Start: 2022-05-25

## 2022-06-20 ENCOUNTER — TELEPHONE (OUTPATIENT)
Dept: DIABETES SERVICES | Facility: CLINIC | Age: 42
End: 2022-06-20
Payer: COMMERCIAL

## 2022-06-20 NOTE — TELEPHONE ENCOUNTER
----- Message from Iona Roberts sent at 6/20/2022  2:42 PM CDT -----  Patient request a refill of Farxiga sent to [facility]

## 2022-08-15 ENCOUNTER — HOSPITAL ENCOUNTER (EMERGENCY)
Facility: HOSPITAL | Age: 42
Discharge: HOME | End: 2022-08-15
Payer: COMMERCIAL

## 2022-08-15 VITALS
TEMPERATURE: 98 F | BODY MASS INDEX: 47.09 KG/M2 | DIASTOLIC BLOOD PRESSURE: 86 MMHG | WEIGHT: 293 LBS | SYSTOLIC BLOOD PRESSURE: 134 MMHG | RESPIRATION RATE: 18 BRPM | HEIGHT: 66 IN | OXYGEN SATURATION: 99 % | HEART RATE: 88 BPM

## 2022-08-15 DIAGNOSIS — M54.31 SCIATICA OF RIGHT SIDE: Primary | ICD-10-CM

## 2022-08-15 DIAGNOSIS — K59.00 CONSTIPATION, UNSPECIFIED CONSTIPATION TYPE: ICD-10-CM

## 2022-08-15 PROCEDURE — 99284 EMERGENCY DEPT VISIT MOD MDM: CPT

## 2022-08-15 PROCEDURE — 99283 EMERGENCY DEPT VISIT LOW MDM: CPT | Mod: ,,, | Performed by: NURSE PRACTITIONER

## 2022-08-15 PROCEDURE — 96372 THER/PROPH/DIAG INJ SC/IM: CPT

## 2022-08-15 PROCEDURE — 99283 PR EMERGENCY DEPT VISIT,LEVEL III: ICD-10-PCS | Mod: ,,, | Performed by: NURSE PRACTITIONER

## 2022-08-15 PROCEDURE — 63600175 PHARM REV CODE 636 W HCPCS: Performed by: NURSE PRACTITIONER

## 2022-08-15 RX ORDER — ORPHENADRINE CITRATE 30 MG/ML
60 INJECTION INTRAMUSCULAR; INTRAVENOUS
Status: COMPLETED | OUTPATIENT
Start: 2022-08-15 | End: 2022-08-15

## 2022-08-15 RX ORDER — TIZANIDINE 4 MG/1
4 TABLET ORAL EVERY 6 HOURS PRN
Qty: 15 TABLET | Refills: 0 | Status: SHIPPED | OUTPATIENT
Start: 2022-08-15 | End: 2022-08-25

## 2022-08-15 RX ORDER — IBUPROFEN 800 MG/1
800 TABLET ORAL 3 TIMES DAILY
Qty: 20 TABLET | Refills: 0 | Status: SHIPPED | OUTPATIENT
Start: 2022-08-15

## 2022-08-15 RX ADMIN — ORPHENADRINE CITRATE 60 MG: 30 INJECTION INTRAMUSCULAR; INTRAVENOUS at 08:08

## 2022-08-15 NOTE — Clinical Note
"Ashly"Cynthia Trent was seen and treated in our emergency department on 8/15/2022.  She may return to work on 08/17/2022.       If you have any questions or concerns, please don't hesitate to call.      JESI Alvarez"

## 2022-08-15 NOTE — ED TRIAGE NOTES
Pt presents to ed with c/o having right leg pain that started while she hurried to the bathroom and was sitting down

## 2022-08-16 NOTE — ED PROVIDER NOTES
Encounter Date: 8/15/2022       History     Chief Complaint   Patient presents with    Leg Pain     Patient presents to ER with complaint of lower back pain and right hip pain.  Patient states the pain started today when she hurried to restroom and sit on potty to have a bowel movement.  She felt something pop in her right hip and she has had pain in hip and right leg since that time.  She states she has tried to go home and lay down but the pain has been to intense.  She reports she is unable to walk on her right leg without assistance.  Denies loss of bowel or bladder control.  No fever no dysuria.  Reports normal daily bowel movements.     The history is provided by the patient and a relative. No  was used.     Review of patient's allergies indicates:   Allergen Reactions    Amoxicillin Rash     Past Medical History:   Diagnosis Date    Acid reflux     Diabetes mellitus, type 2     HTN (hypertension)     Hyperlipidemia     Migraine with aura     Obesity      Past Surgical History:   Procedure Laterality Date    CHOLECYSTECTOMY      TONSILLECTOMY       Family History   Problem Relation Age of Onset    Asthma Mother     Diabetes Mother     Hypertension Mother     Hypertension Father     Diabetes Paternal Grandmother     Heart disease Paternal Grandfather     Hypertension Sister     No Known Problems Brother     No Known Problems Brother      Social History     Tobacco Use    Smoking status: Never Smoker    Smokeless tobacco: Never Used   Substance Use Topics    Alcohol use: Yes    Drug use: Not Currently     Review of Systems   Constitutional: Positive for fatigue.   Gastrointestinal: Positive for abdominal pain.   Musculoskeletal: Positive for back pain and myalgias.   All other systems reviewed and are negative.      Physical Exam     Initial Vitals [08/15/22 1733]   BP Pulse Resp Temp SpO2   (!) 147/92 99 16 98.3 °F (36.8 °C) 96 %      MAP       --         Physical  Exam    Nursing note and vitals reviewed.  Constitutional: She appears well-developed and well-nourished.   HENT:   Head: Normocephalic and atraumatic.   Right Ear: External ear normal.   Left Ear: External ear normal.   Nose: Nose normal.   Mouth/Throat: Oropharynx is clear and moist.   Eyes: Conjunctivae and EOM are normal. Pupils are equal, round, and reactive to light.   Neck: Neck supple.   Normal range of motion.  Cardiovascular: Normal rate, regular rhythm and intact distal pulses.   Pulmonary/Chest: Breath sounds normal.   Abdominal: Abdomen is soft. Bowel sounds are normal. She exhibits distension. There is abdominal tenderness (suprapubic).   Musculoskeletal:         General: Normal range of motion.      Cervical back: Normal range of motion and neck supple.     Neurological: She is alert and oriented to person, place, and time. She has normal strength. GCS score is 15. GCS eye subscore is 4. GCS verbal subscore is 5. GCS motor subscore is 6.   Skin: Skin is warm and dry. Capillary refill takes less than 2 seconds.   Psychiatric: She has a normal mood and affect. Her behavior is normal. Judgment and thought content normal.         Medical Screening Exam   See Full Note    ED Course   Procedures  Labs Reviewed - No data to display       Imaging Results          X-Ray Lumbar Spine Ap And Lateral (In process)                X-Ray Hip 2 or 3 views Right (with Pelvis when performed) (In process)                  Medications   orphenadrine injection 60 mg (60 mg Intramuscular Given 8/15/22 2021)                       Clinical Impression:   Final diagnoses:  [M54.31] Sciatica of right side (Primary)  [K59.00] Constipation, unspecified constipation type          ED Disposition Condition    Discharge Stable        ED Prescriptions     Medication Sig Dispense Start Date End Date Auth. Provider    tiZANidine (ZANAFLEX) 4 MG tablet Take 1 tablet (4 mg total) by mouth every 6 (six) hours as needed (muscle spasms). 15  tablet 8/15/2022 8/25/2022 JESI Alvarez    ibuprofen (ADVIL,MOTRIN) 800 MG tablet Take 1 tablet (800 mg total) by mouth 3 (three) times daily. 20 tablet 8/15/2022  JESI Alvarez        Follow-up Information     Follow up With Specialties Details Why Contact Info    [facility] Schedule an appointment as soon as possible for a visit in 2 days If symptoms worsen [address] [place] [address] Primary Care  [facility] [address]  [phone]             JESI Alvarez  08/15/22 8971

## 2022-08-16 NOTE — DISCHARGE INSTRUCTIONS
Take medication as prescribed.   Alternate heat and ice compresses for comfort.   Drink one bottle of magnesium citrate today.    Follow with four 16 oz bottles of water.    Add Miralax 1 capful to daily medications x 2 weeks, then use only as needed.   Increase water and fiber intake in diet.   Follow up with PCP in 2 days if symptoms do not resolve.   Return to ER with new or worsening symptoms.

## 2022-08-22 ENCOUNTER — OFFICE VISIT (OUTPATIENT)
Dept: DIABETES SERVICES | Facility: CLINIC | Age: 42
End: 2022-08-22
Payer: COMMERCIAL

## 2022-08-22 VITALS
HEART RATE: 99 BPM | HEIGHT: 66 IN | DIASTOLIC BLOOD PRESSURE: 76 MMHG | BODY MASS INDEX: 47.09 KG/M2 | RESPIRATION RATE: 16 BRPM | SYSTOLIC BLOOD PRESSURE: 128 MMHG | OXYGEN SATURATION: 97 % | WEIGHT: 293 LBS

## 2022-08-22 DIAGNOSIS — E66.9 OBESITY, UNSPECIFIED CLASSIFICATION, UNSPECIFIED OBESITY TYPE, UNSPECIFIED WHETHER SERIOUS COMORBIDITY PRESENT: ICD-10-CM

## 2022-08-22 DIAGNOSIS — I10 PRIMARY HYPERTENSION: ICD-10-CM

## 2022-08-22 DIAGNOSIS — Z79.4 TYPE 2 DIABETES MELLITUS WITH HYPERGLYCEMIA, WITH LONG-TERM CURRENT USE OF INSULIN: Primary | ICD-10-CM

## 2022-08-22 DIAGNOSIS — E11.65 TYPE 2 DIABETES MELLITUS WITH HYPERGLYCEMIA, WITH LONG-TERM CURRENT USE OF INSULIN: Primary | ICD-10-CM

## 2022-08-22 DIAGNOSIS — E78.5 HYPERLIPIDEMIA, UNSPECIFIED HYPERLIPIDEMIA TYPE: ICD-10-CM

## 2022-08-22 LAB
GLUCOSE SERPL-MCNC: 331 MG/DL (ref 70–110)
HBA1C MFR BLD: 11.3 % (ref 4.5–6.6)

## 2022-08-22 PROCEDURE — 99215 OFFICE O/P EST HI 40 MIN: CPT | Mod: PBBFAC,25 | Performed by: NURSE PRACTITIONER

## 2022-08-22 PROCEDURE — 99214 PR OFFICE/OUTPT VISIT, EST, LEVL IV, 30-39 MIN: ICD-10-PCS | Mod: S$PBB,,, | Performed by: NURSE PRACTITIONER

## 2022-08-22 PROCEDURE — 1159F PR MEDICATION LIST DOCUMENTED IN MEDICAL RECORD: ICD-10-PCS | Mod: CPTII,,, | Performed by: NURSE PRACTITIONER

## 2022-08-22 PROCEDURE — 4010F PR ACE/ARB THEARPY RXD/TAKEN: ICD-10-PCS | Mod: CPTII,,, | Performed by: NURSE PRACTITIONER

## 2022-08-22 PROCEDURE — 1159F MED LIST DOCD IN RCRD: CPT | Mod: CPTII,,, | Performed by: NURSE PRACTITIONER

## 2022-08-22 PROCEDURE — 3008F PR BODY MASS INDEX (BMI) DOCUMENTED: ICD-10-PCS | Mod: CPTII,,, | Performed by: NURSE PRACTITIONER

## 2022-08-22 PROCEDURE — 3078F PR MOST RECENT DIASTOLIC BLOOD PRESSURE < 80 MM HG: ICD-10-PCS | Mod: CPTII,,, | Performed by: NURSE PRACTITIONER

## 2022-08-22 PROCEDURE — 3008F BODY MASS INDEX DOCD: CPT | Mod: CPTII,,, | Performed by: NURSE PRACTITIONER

## 2022-08-22 PROCEDURE — 4010F ACE/ARB THERAPY RXD/TAKEN: CPT | Mod: CPTII,,, | Performed by: NURSE PRACTITIONER

## 2022-08-22 PROCEDURE — 3074F SYST BP LT 130 MM HG: CPT | Mod: CPTII,,, | Performed by: NURSE PRACTITIONER

## 2022-08-22 PROCEDURE — 3074F PR MOST RECENT SYSTOLIC BLOOD PRESSURE < 130 MM HG: ICD-10-PCS | Mod: CPTII,,, | Performed by: NURSE PRACTITIONER

## 2022-08-22 PROCEDURE — 1160F PR REVIEW ALL MEDS BY PRESCRIBER/CLIN PHARMACIST DOCUMENTED: ICD-10-PCS | Mod: CPTII,,, | Performed by: NURSE PRACTITIONER

## 2022-08-22 PROCEDURE — 83036 HEMOGLOBIN GLYCOSYLATED A1C: CPT | Mod: PBBFAC | Performed by: NURSE PRACTITIONER

## 2022-08-22 PROCEDURE — 3078F DIAST BP <80 MM HG: CPT | Mod: CPTII,,, | Performed by: NURSE PRACTITIONER

## 2022-08-22 PROCEDURE — 99214 OFFICE O/P EST MOD 30 MIN: CPT | Mod: S$PBB,,, | Performed by: NURSE PRACTITIONER

## 2022-08-22 PROCEDURE — 1160F RVW MEDS BY RX/DR IN RCRD: CPT | Mod: CPTII,,, | Performed by: NURSE PRACTITIONER

## 2022-08-22 PROCEDURE — 82962 GLUCOSE BLOOD TEST: CPT | Mod: PBBFAC | Performed by: NURSE PRACTITIONER

## 2022-08-22 RX ORDER — INSULIN DETEMIR 100 [IU]/ML
INJECTION, SOLUTION SUBCUTANEOUS NIGHTLY
COMMUNITY
End: 2023-02-22 | Stop reason: SDUPTHER

## 2022-08-22 RX ORDER — INSULIN ASPART 100 [IU]/ML
INJECTION, SOLUTION INTRAVENOUS; SUBCUTANEOUS
COMMUNITY
End: 2023-02-22 | Stop reason: SDUPTHER

## 2022-08-22 NOTE — PATIENT INSTRUCTIONS
Increase levemir to 38 units daily  Continue with ozempic at 0.5 once weekly  Start farxiga 10mg once daily  Start novolog 2 unit sliding scale before all meals.   Sliding scale to be taken 5-10 min before each meal.    100-150=2 units  151-200=4 units  201-250=6 units  251-300=8 units  301-350=10 units     Snacks with 0-5 grams carbs   Hard Boiled Egg   Crystal Light, Vitamin Water, Powerade Zero   Herbal tea, unsweetened   8 oz unsweetened almond milk   2 tsp peanut butter on celery   ½ cup sugar-free Jell-O   1 sugar-free popsicle   Non starchy vegetables such as carrots or celery sticks with lowfat dressing   ½ oz lowfat cheese or string cheese   1 closed handful of nuts or tbsp of seeds, unsalted    Snacks with 15 gram carbs  . 1 small piece of fruit or . banana or . cup light canned fruit  . 3 aftab cracker squares  . 3 cups popcorn  . 5 Vanilla Wafers  . 1/2 cup low fat, no added sugar ice cream or frozen yogurt  . 1/2 turkey, ham, or chicken sandwich  . 1.2 cup fruit with 1/2 cup of cottage cheese  . 4-6 unsalted wheat crackers with 1 oz low fat cheese or 1 tbsp peanut butter  . 30 goldfish crackers  . 7-8 mini rice cakes  . 1/3 cup hummus dip with raw vegetables  . 1/2 whole wheat lorraine, 1 tbsp hummus  . Mini pizza (. whole wheat English muffin, low-fat cheese, tomato sauce)  . 100 calorie snack pack  . 4-6 oz light yogurt  . 1/2 cup sugar-free pudding

## 2022-08-22 NOTE — PROGRESS NOTES
Subjective:       Patient ID: Ashly Trent is a 41 y.o. female.    Chief Complaint: Diabetes Mellitus (Pt here for follow up visit and A1c, c/o sciatic pain, checks sugar 1 x day.)    Here today for routine evaluation and med refill. Glucose is not well controlled at this point and her weight is unchanged.  She reports that she is trying to eat better.  She has not been exercising.   On statin and ARB  DM regimen: levemir,ozempic and has not been able to take farxiga due to obtaining it.  I will call the pharmacy now and check on this and ensure that she has a refill of it on [address] under [facility].    Hemoglobin A1C       Date                     Value               Ref Range           Status                08/22/2022               11.3 (A)            4.5 - 6.6 %         Final                 05/18/2022               10.1 (A)            4.5 - 6.6 %         Final                 12/22/2021               10.7 (A)            4.5 - 6.6 %         Final            ----------  Lab Results       Component                Value               Date                       MICROALBUR               3.9 (H)             09/22/2021            Lab Results       Component                Value               Date                       CHOL                     124                 02/17/2022                 CHOL                     166                 09/22/2021            Lab Results       Component                Value               Date                       HDL                      51                  02/17/2022                 HDL                      55                  09/22/2021            Lab Results       Component                Value               Date                       LDLCALC                  52                  02/17/2022                 LDLCALC                  83                  09/22/2021            Lab Results       Component                Value               Date                       TRIG                      105                 02/17/2022                 TRIG                     139                 09/22/2021            Lab Results       Component                Value               Date                       CHOLHDL                  2.4                 02/17/2022                 CHOLHDL                  3.0                 09/22/2021            CMP  Sodium       Date                     Value               Ref Range           Status                02/17/2022               136                 136 - 145 mmol*     Final            ----------  Potassium       Date                     Value               Ref Range           Status                02/17/2022               4.5                 3.5 - 5.1 mmol*     Final            ----------  Chloride       Date                     Value               Ref Range           Status                02/17/2022               103                 98 - 107 mmol/L     Final            ----------  CO2       Date                     Value               Ref Range           Status                02/17/2022               29                  21 - 32 mmol/L      Final            ----------  Glucose       Date                     Value               Ref Range           Status                02/17/2022               268 (H)             74 - 106 mg/dL      Final            ----------  BUN       Date                     Value               Ref Range           Status                02/17/2022               10                  7 - 18 mg/dL        Final            ----------  Creatinine       Date                     Value               Ref Range           Status                02/17/2022               1.04 (H)            0.55 - 1.02 mg*     Final            ----------  Calcium       Date                     Value               Ref Range           Status                02/17/2022               9.0                 8.5 - 10.1 mg/*     Final            ----------  Total Protein       Date                      Value               Ref Range           Status                02/17/2022               7.6                 6.4 - 8.2 g/dL      Final            ----------  Albumin       Date                     Value               Ref Range           Status                02/17/2022               3.5                 3.5 - 5.0 g/dL      Final            ----------  Bilirubin, Total       Date                     Value               Ref Range           Status                02/17/2022               0.9                 0.0 - 1.2 mg/dL     Final            ----------  Alk Phos       Date                     Value               Ref Range           Status                02/17/2022               127 (H)             37 - 98 U/L         Final            ----------  AST       Date                     Value               Ref Range           Status                02/17/2022               40 (H)              15 - 37 U/L         Final            ----------  ALT       Date                     Value               Ref Range           Status                02/17/2022               38                  13 - 56 U/L         Final            ----------  Anion Gap       Date                     Value               Ref Range           Status                02/17/2022               9                   7 - 16 mmol/L       Final            ----------  eGFR        Date                     Value               Ref Range           Status                09/22/2021               98                  >=60 mL/min/1.*     Final            ----------  eGFR       Date                     Value               Ref Range           Status                02/17/2022               63                  >=60 mL/min/1.*     Final            ----------      Review of Systems   Constitutional: Negative for activity change, appetite change, diaphoresis and fatigue.   HENT: Negative for nasal congestion, facial swelling and sinus pressure/congestion.    Eyes: Negative  for visual disturbance.   Respiratory: Negative for shortness of breath and wheezing.    Cardiovascular: Negative for chest pain and leg swelling.   Gastrointestinal: Negative for constipation, diarrhea, nausea and vomiting.   Endocrine: Negative for polydipsia, polyphagia and polyuria.   Genitourinary: Negative for dysuria, frequency and urgency.   Musculoskeletal: Negative for gait problem and myalgias.   Integumentary:  Negative for color change, rash and wound.   Neurological: Negative for dizziness, syncope, weakness, headaches, disturbances in coordination and coordination difficulties.   Hematological: Does not bruise/bleed easily.   Psychiatric/Behavioral: Negative for self-injury, sleep disturbance and suicidal ideas. The patient is not nervous/anxious.          Objective:      Physical Exam  Vitals and nursing note reviewed.   Constitutional:       Appearance: Normal appearance.   HENT:      Head: Normocephalic.   Cardiovascular:      Rate and Rhythm: Normal rate and regular rhythm.      Pulses:           Dorsalis pedis pulses are 3+ on the right side and 3+ on the left side.        Posterior tibial pulses are 3+ on the right side and 3+ on the left side.      Heart sounds: Normal heart sounds.   Pulmonary:      Effort: Pulmonary effort is normal.      Breath sounds: Normal breath sounds.   Musculoskeletal:         General: Normal range of motion.      Right foot: Normal range of motion. No deformity.      Left foot: Normal range of motion. No deformity.   Feet:      Right foot:      Protective Sensation: 6 sites tested. 6 sites sensed.      Skin integrity: Skin integrity normal. No ulcer or callus.      Toenail Condition: Right toenails are normal.      Left foot:      Protective Sensation: 6 sites tested. 6 sites sensed.      Skin integrity: Skin integrity normal. No ulcer or callus.      Toenail Condition: Left toenails are normal.   Skin:     General: Skin is warm and dry.   Neurological:      General:  No focal deficit present.      Mental Status: She is alert and oriented to person, place, and time.   Psychiatric:         Mood and Affect: Mood normal.         Behavior: Behavior normal.         Thought Content: Thought content normal.         Judgment: Judgment normal.         Assessment:       Problem List Items Addressed This Visit        Cardiac/Vascular    HTN (hypertension)    Hyperlipidemia       Endocrine    Diabetes mellitus, type 2 - Primary    Relevant Medications    insulin detemir U-100 (LEVEMIR FLEXTOUCH U-100 INSULN) 100 unit/mL (3 mL) InPn pen    Other Relevant Orders    Hemoglobin A1C, POCT (Completed)    POCT Glucose, Hand-Held Device (Completed)    Obesity          Plan:       Problem List Items Addressed This Visit        Cardiac/Vascular    HTN (hypertension)    Hyperlipidemia       Endocrine    Diabetes mellitus, type 2 - Primary    Relevant Medications    insulin detemir U-100 (LEVEMIR FLEXTOUCH U-100 INSULN) 100 unit/mL (3 mL) InPn pen    Other Relevant Orders    Hemoglobin A1C, POCT (Completed)    POCT Glucose, Hand-Held Device (Completed)    Obesity             increase levemir to 38 units daily  Continue with ozempic at 0.5 once weekly  Start farxiga 10mg once daily    Start novolog 2 unit sliding scale before all meals.   Sliding scale to be taken 5-10 min before each meal.    100-150=2 units  151-200=4 units  201-250=6 units  251-300=8 units  301-350=10 units     Lifestyle modifications encouraged.

## 2022-10-19 ENCOUNTER — HOSPITAL ENCOUNTER (OUTPATIENT)
Dept: RADIOLOGY | Facility: HOSPITAL | Age: 42
Discharge: HOME | End: 2022-10-19
Attending: NURSE PRACTITIONER
Payer: COMMERCIAL

## 2022-10-19 VITALS — BODY MASS INDEX: 47.09 KG/M2 | WEIGHT: 293 LBS | HEIGHT: 66 IN

## 2022-10-19 DIAGNOSIS — Z12.31 OTHER SCREENING MAMMOGRAM: ICD-10-CM

## 2022-10-19 PROCEDURE — 77067 SCR MAMMO BI INCL CAD: CPT | Mod: TC

## 2022-11-22 ENCOUNTER — OFFICE VISIT (OUTPATIENT)
Dept: FAMILY MEDICINE | Facility: CLINIC | Age: 42
End: 2022-11-22
Payer: COMMERCIAL

## 2022-11-22 VITALS
RESPIRATION RATE: 18 BRPM | HEART RATE: 82 BPM | SYSTOLIC BLOOD PRESSURE: 125 MMHG | HEIGHT: 66 IN | WEIGHT: 293 LBS | DIASTOLIC BLOOD PRESSURE: 88 MMHG | BODY MASS INDEX: 47.09 KG/M2 | OXYGEN SATURATION: 97 % | TEMPERATURE: 98 F

## 2022-11-22 DIAGNOSIS — R73.9 ELEVATED BLOOD SUGAR: ICD-10-CM

## 2022-11-22 DIAGNOSIS — B37.31 VAGINAL CANDIDIASIS: Primary | ICD-10-CM

## 2022-11-22 LAB — GLUCOSE SERPL-MCNC: 263 MG/DL (ref 70–110)

## 2022-11-22 PROCEDURE — 99051 PR MEDICAL SERVICES, EVE/WKEND/HOLIDAY: ICD-10-PCS | Mod: ,,, | Performed by: FAMILY MEDICINE

## 2022-11-22 PROCEDURE — 3074F SYST BP LT 130 MM HG: CPT | Mod: CPTII,,, | Performed by: FAMILY MEDICINE

## 2022-11-22 PROCEDURE — 99213 OFFICE O/P EST LOW 20 MIN: CPT | Mod: ,,, | Performed by: FAMILY MEDICINE

## 2022-11-22 PROCEDURE — 3008F BODY MASS INDEX DOCD: CPT | Mod: CPTII,,, | Performed by: FAMILY MEDICINE

## 2022-11-22 PROCEDURE — 99213 PR OFFICE/OUTPT VISIT, EST, LEVL III, 20-29 MIN: ICD-10-PCS | Mod: ,,, | Performed by: FAMILY MEDICINE

## 2022-11-22 PROCEDURE — 1160F PR REVIEW ALL MEDS BY PRESCRIBER/CLIN PHARMACIST DOCUMENTED: ICD-10-PCS | Mod: CPTII,,, | Performed by: FAMILY MEDICINE

## 2022-11-22 PROCEDURE — 1159F MED LIST DOCD IN RCRD: CPT | Mod: CPTII,,, | Performed by: FAMILY MEDICINE

## 2022-11-22 PROCEDURE — 1160F RVW MEDS BY RX/DR IN RCRD: CPT | Mod: CPTII,,, | Performed by: FAMILY MEDICINE

## 2022-11-22 PROCEDURE — 99051 MED SERV EVE/WKEND/HOLIDAY: CPT | Mod: ,,, | Performed by: FAMILY MEDICINE

## 2022-11-22 PROCEDURE — 4010F PR ACE/ARB THEARPY RXD/TAKEN: ICD-10-PCS | Mod: CPTII,,, | Performed by: FAMILY MEDICINE

## 2022-11-22 PROCEDURE — 3074F PR MOST RECENT SYSTOLIC BLOOD PRESSURE < 130 MM HG: ICD-10-PCS | Mod: CPTII,,, | Performed by: FAMILY MEDICINE

## 2022-11-22 PROCEDURE — 1159F PR MEDICATION LIST DOCUMENTED IN MEDICAL RECORD: ICD-10-PCS | Mod: CPTII,,, | Performed by: FAMILY MEDICINE

## 2022-11-22 PROCEDURE — 3008F PR BODY MASS INDEX (BMI) DOCUMENTED: ICD-10-PCS | Mod: CPTII,,, | Performed by: FAMILY MEDICINE

## 2022-11-22 PROCEDURE — 4010F ACE/ARB THERAPY RXD/TAKEN: CPT | Mod: CPTII,,, | Performed by: FAMILY MEDICINE

## 2022-11-22 PROCEDURE — 3079F PR MOST RECENT DIASTOLIC BLOOD PRESSURE 80-89 MM HG: ICD-10-PCS | Mod: CPTII,,, | Performed by: FAMILY MEDICINE

## 2022-11-22 PROCEDURE — 3079F DIAST BP 80-89 MM HG: CPT | Mod: CPTII,,, | Performed by: FAMILY MEDICINE

## 2022-11-22 RX ORDER — FLUCONAZOLE 150 MG/1
150 TABLET ORAL
Qty: 30 TABLET | Refills: 1 | Status: SHIPPED | OUTPATIENT
Start: 2022-11-22 | End: 2023-02-22

## 2022-11-23 NOTE — PROGRESS NOTES
Subjective:       Patient ID: Ashly Trent is a 42 y.o. female.    Chief Complaint: Diabetes (States blood sugar has been running into the mid 300s after stopping farxiga 10mg 4 days ago./) and Vaginal Itching (States since the start Farxiga she has been having vaginal irritation and pain. No discharge. )    Pt had to stop farxiga as she gets yeast infections when off diflucan and she ran out.     Review of Systems   Constitutional:  Negative for activity change, appetite change, chills, diaphoresis, fatigue, fever and unexpected weight change.   HENT:  Negative for nasal congestion, dental problem, drooling, ear discharge, ear pain, facial swelling, hearing loss, mouth sores, nosebleeds, postnasal drip, rhinorrhea, sinus pressure/congestion, sneezing, sore throat, tinnitus, trouble swallowing, voice change and goiter.    Eyes:  Negative for photophobia, discharge, itching and visual disturbance.   Respiratory:  Negative for apnea, cough, choking, chest tightness, shortness of breath, wheezing and stridor.    Cardiovascular:  Negative for chest pain, palpitations, leg swelling and claudication.   Gastrointestinal:  Negative for abdominal distention, abdominal pain, anal bleeding, blood in stool, change in bowel habit, constipation, diarrhea, nausea, vomiting and change in bowel habit.   Endocrine: Negative for cold intolerance, heat intolerance, polydipsia, polyphagia and polyuria.   Genitourinary:  Positive for vaginal dryness. Negative for bladder incontinence, decreased urine volume, difficulty urinating, dysuria, enuresis, flank pain, frequency, hematuria, nocturia, pelvic pain and urgency.   Musculoskeletal:  Negative for arthralgias, back pain, gait problem, joint swelling, leg pain, myalgias, neck pain, neck stiffness and joint deformity.   Integumentary:  Negative for pallor, rash, wound, breast mass and breast tenderness.   Allergic/Immunologic: Negative for environmental allergies, food allergies and  immunocompromised state.   Neurological:  Negative for dizziness, vertigo, tremors, seizures, syncope, facial asymmetry, speech difficulty, weakness, light-headedness, numbness, headaches, coordination difficulties, memory loss and coordination difficulties.   Hematological:  Negative for adenopathy. Does not bruise/bleed easily.   Psychiatric/Behavioral:  Negative for agitation, behavioral problems, confusion, decreased concentration, dysphoric mood, hallucinations, self-injury, sleep disturbance and suicidal ideas. The patient is not nervous/anxious and is not hyperactive.    Breast: Negative for mass and tenderness      Objective:      Physical Exam  Vitals reviewed.   Constitutional:       Appearance: Normal appearance.   HENT:      Head: Normocephalic and atraumatic.      Right Ear: Tympanic membrane, ear canal and external ear normal.      Left Ear: Tympanic membrane, ear canal and external ear normal.      Nose: Nose normal.      Mouth/Throat:      Mouth: Mucous membranes are moist.      Pharynx: Oropharynx is clear.   Eyes:      Extraocular Movements: Extraocular movements intact.      Conjunctiva/sclera: Conjunctivae normal.      Pupils: Pupils are equal, round, and reactive to light.   Cardiovascular:      Rate and Rhythm: Normal rate and regular rhythm.      Pulses: Normal pulses.      Heart sounds: Normal heart sounds.   Pulmonary:      Effort: Pulmonary effort is normal.      Breath sounds: Normal breath sounds.   Abdominal:      General: Bowel sounds are normal.      Palpations: Abdomen is soft.   Musculoskeletal:         General: Normal range of motion.      Cervical back: Normal range of motion and neck supple.   Skin:     General: Skin is warm and dry.   Neurological:      General: No focal deficit present.      Mental Status: She is alert. Mental status is at baseline.   Psychiatric:         Mood and Affect: Mood normal.         Behavior: Behavior normal.         Thought Content: Thought content  normal.         Judgment: Judgment normal.       Assessment:       1. Vaginal candidiasis    2. Elevated blood sugar          Plan:     Vaginal candidiasis    Elevated blood sugar  -     POCT glucose    Other orders  -     fluconazole (DIFLUCAN) 150 MG Tab; Take 1 tablet (150 mg total) by mouth twice a week.  Dispense: 30 tablet; Refill: 1       Will refill diflucan, pt will take it twice weekly, instructed to restart farxiga.

## 2022-11-30 ENCOUNTER — OFFICE VISIT (OUTPATIENT)
Dept: DIABETES SERVICES | Facility: CLINIC | Age: 42
End: 2022-11-30
Payer: COMMERCIAL

## 2022-11-30 VITALS
HEIGHT: 66 IN | BODY MASS INDEX: 47.09 KG/M2 | HEART RATE: 83 BPM | WEIGHT: 293 LBS | RESPIRATION RATE: 16 BRPM | DIASTOLIC BLOOD PRESSURE: 82 MMHG | OXYGEN SATURATION: 98 % | SYSTOLIC BLOOD PRESSURE: 126 MMHG

## 2022-11-30 DIAGNOSIS — Z79.4 TYPE 2 DIABETES MELLITUS WITH HYPERGLYCEMIA, WITH LONG-TERM CURRENT USE OF INSULIN: Primary | ICD-10-CM

## 2022-11-30 DIAGNOSIS — E66.9 OBESITY, UNSPECIFIED CLASSIFICATION, UNSPECIFIED OBESITY TYPE, UNSPECIFIED WHETHER SERIOUS COMORBIDITY PRESENT: ICD-10-CM

## 2022-11-30 DIAGNOSIS — E11.65 TYPE 2 DIABETES MELLITUS WITH HYPERGLYCEMIA, WITH LONG-TERM CURRENT USE OF INSULIN: Primary | ICD-10-CM

## 2022-11-30 DIAGNOSIS — I10 PRIMARY HYPERTENSION: ICD-10-CM

## 2022-11-30 DIAGNOSIS — E78.5 HYPERLIPIDEMIA, UNSPECIFIED HYPERLIPIDEMIA TYPE: ICD-10-CM

## 2022-11-30 LAB
GLUCOSE SERPL-MCNC: 136 MG/DL (ref 70–110)
HBA1C MFR BLD: 10.1 % (ref 4.5–6.6)

## 2022-11-30 PROCEDURE — 3074F SYST BP LT 130 MM HG: CPT | Mod: CPTII,,, | Performed by: NURSE PRACTITIONER

## 2022-11-30 PROCEDURE — 3008F BODY MASS INDEX DOCD: CPT | Mod: CPTII,,, | Performed by: NURSE PRACTITIONER

## 2022-11-30 PROCEDURE — 1159F PR MEDICATION LIST DOCUMENTED IN MEDICAL RECORD: ICD-10-PCS | Mod: CPTII,,, | Performed by: NURSE PRACTITIONER

## 2022-11-30 PROCEDURE — 3008F PR BODY MASS INDEX (BMI) DOCUMENTED: ICD-10-PCS | Mod: CPTII,,, | Performed by: NURSE PRACTITIONER

## 2022-11-30 PROCEDURE — 99213 PR OFFICE/OUTPT VISIT, EST, LEVL III, 20-29 MIN: ICD-10-PCS | Mod: S$PBB,,, | Performed by: NURSE PRACTITIONER

## 2022-11-30 PROCEDURE — 4010F ACE/ARB THERAPY RXD/TAKEN: CPT | Mod: CPTII,,, | Performed by: NURSE PRACTITIONER

## 2022-11-30 PROCEDURE — 3079F PR MOST RECENT DIASTOLIC BLOOD PRESSURE 80-89 MM HG: ICD-10-PCS | Mod: CPTII,,, | Performed by: NURSE PRACTITIONER

## 2022-11-30 PROCEDURE — 3074F PR MOST RECENT SYSTOLIC BLOOD PRESSURE < 130 MM HG: ICD-10-PCS | Mod: CPTII,,, | Performed by: NURSE PRACTITIONER

## 2022-11-30 PROCEDURE — 4010F PR ACE/ARB THEARPY RXD/TAKEN: ICD-10-PCS | Mod: CPTII,,, | Performed by: NURSE PRACTITIONER

## 2022-11-30 PROCEDURE — 99215 OFFICE O/P EST HI 40 MIN: CPT | Mod: PBBFAC | Performed by: NURSE PRACTITIONER

## 2022-11-30 PROCEDURE — 99213 OFFICE O/P EST LOW 20 MIN: CPT | Mod: S$PBB,,, | Performed by: NURSE PRACTITIONER

## 2022-11-30 PROCEDURE — 1159F MED LIST DOCD IN RCRD: CPT | Mod: CPTII,,, | Performed by: NURSE PRACTITIONER

## 2022-11-30 PROCEDURE — 83036 HEMOGLOBIN GLYCOSYLATED A1C: CPT | Mod: PBBFAC | Performed by: NURSE PRACTITIONER

## 2022-11-30 PROCEDURE — 1160F RVW MEDS BY RX/DR IN RCRD: CPT | Mod: CPTII,,, | Performed by: NURSE PRACTITIONER

## 2022-11-30 PROCEDURE — 3079F DIAST BP 80-89 MM HG: CPT | Mod: CPTII,,, | Performed by: NURSE PRACTITIONER

## 2022-11-30 PROCEDURE — 82962 GLUCOSE BLOOD TEST: CPT | Mod: PBBFAC | Performed by: NURSE PRACTITIONER

## 2022-11-30 PROCEDURE — 1160F PR REVIEW ALL MEDS BY PRESCRIBER/CLIN PHARMACIST DOCUMENTED: ICD-10-PCS | Mod: CPTII,,, | Performed by: NURSE PRACTITIONER

## 2022-11-30 NOTE — PROGRESS NOTES
"Subjective:       Patient ID: Ashly Trent is a 42 y.o. female.    Chief Complaint: Diabetes Mellitus (Here for f/u and A1C  stopped taking farxiga due to yeast infection but started back taking after getting diflucan  checks sugar tid)    Pt is here today for routine evaluation and med refill.  She admits that she is not taking her medications as she should.  Has not been taking farxiga due to yeast infections.  Was seen at immediate care and given 30 diflucan with 1 refill.  I advised that she should restart the farxiga and take one tablet on days 1,2 7, and 8 and if symptoms return she will need to not take farxiga.  I do not suggest taking diflucan regularly. Advised to take basal and bolus insulin as directed as she admits that she has not been doing so.  Also not taking ozempic but is willing to restart it.  States that she has had a loss in her family a month or so ago and that she got "off track after that"  She is not monitoring her glucose as she should.     Hemoglobin A1C       Date                     Value               Ref Range           Status                11/30/2022               10.1 (A)            4.5 - 6.6 %         Final                 08/22/2022               11.3 (A)            4.5 - 6.6 %         Final                 05/18/2022               10.1 (A)            4.5 - 6.6 %         Final            ----------  Lab Results       Component                Value               Date                       MICROALBUR               3.9 (H)             09/22/2021            Lab Results       Component                Value               Date                       CHOL                     124                 02/17/2022                 CHOL                     166                 09/22/2021            Lab Results       Component                Value               Date                       HDL                      51                  02/17/2022                 HDL                      55             "      09/22/2021            Lab Results       Component                Value               Date                       LDLCALC                  52                  02/17/2022                 LDLCALC                  83                  09/22/2021            Lab Results       Component                Value               Date                       TRIG                     105                 02/17/2022                 TRIG                     139                 09/22/2021            Lab Results       Component                Value               Date                       CHOLHDL                  2.4                 02/17/2022                 CHOLHDL                  3.0                 09/22/2021            CMP  Sodium       Date                     Value               Ref Range           Status                02/17/2022               136                 136 - 145 mmol*     Final            ----------  Potassium       Date                     Value               Ref Range           Status                02/17/2022               4.5                 3.5 - 5.1 mmol*     Final            ----------  Chloride       Date                     Value               Ref Range           Status                02/17/2022               103                 98 - 107 mmol/L     Final            ----------  CO2       Date                     Value               Ref Range           Status                02/17/2022               29                  21 - 32 mmol/L      Final            ----------  Glucose       Date                     Value               Ref Range           Status                02/17/2022               268 (H)             74 - 106 mg/dL      Final            ----------  BUN       Date                     Value               Ref Range           Status                02/17/2022               10                  7 - 18 mg/dL        Final            ----------  Creatinine       Date                     Value               Ref  Range           Status                02/17/2022               1.04 (H)            0.55 - 1.02 mg*     Final            ----------  Calcium       Date                     Value               Ref Range           Status                02/17/2022               9.0                 8.5 - 10.1 mg/*     Final            ----------  Total Protein       Date                     Value               Ref Range           Status                02/17/2022               7.6                 6.4 - 8.2 g/dL      Final            ----------  Albumin       Date                     Value               Ref Range           Status                02/17/2022               3.5                 3.5 - 5.0 g/dL      Final            ----------  Bilirubin, Total       Date                     Value               Ref Range           Status                02/17/2022               0.9                 0.0 - 1.2 mg/dL     Final            ----------  Alk Phos       Date                     Value               Ref Range           Status                02/17/2022               127 (H)             37 - 98 U/L         Final            ----------  AST       Date                     Value               Ref Range           Status                02/17/2022               40 (H)              15 - 37 U/L         Final            ----------  ALT       Date                     Value               Ref Range           Status                02/17/2022               38                  13 - 56 U/L         Final            ----------  Anion Gap       Date                     Value               Ref Range           Status                02/17/2022               9                   7 - 16 mmol/L       Final            ----------    Review of Systems   Constitutional:  Negative for activity change, appetite change, diaphoresis and fatigue.   HENT:  Negative for nasal congestion, facial swelling and sinus pressure/congestion.    Eyes:  Negative for visual disturbance.    Respiratory:  Negative for shortness of breath and wheezing.    Cardiovascular:  Negative for chest pain and leg swelling.   Gastrointestinal:  Negative for constipation, diarrhea, nausea and vomiting.   Endocrine: Negative for polydipsia, polyphagia and polyuria.   Genitourinary:  Negative for dysuria, frequency and urgency.   Musculoskeletal:  Negative for gait problem and myalgias.   Integumentary:  Negative for color change, rash and wound.   Neurological:  Negative for dizziness, syncope, weakness, headaches, coordination difficulties and coordination difficulties.   Hematological:  Does not bruise/bleed easily.   Psychiatric/Behavioral:  Negative for self-injury, sleep disturbance and suicidal ideas. The patient is not nervous/anxious.        Objective:      Physical Exam  Vitals and nursing note reviewed.   Constitutional:       Appearance: Normal appearance.   HENT:      Head: Normocephalic.   Cardiovascular:      Rate and Rhythm: Normal rate and regular rhythm.      Pulses:           Dorsalis pedis pulses are 3+ on the right side and 3+ on the left side.        Posterior tibial pulses are 3+ on the right side and 3+ on the left side.      Heart sounds: Normal heart sounds.   Pulmonary:      Effort: Pulmonary effort is normal.      Breath sounds: Normal breath sounds.   Musculoskeletal:         General: Normal range of motion.      Right foot: Normal range of motion. No deformity.      Left foot: Normal range of motion. No deformity.   Feet:      Right foot:      Protective Sensation: 6 sites tested.  6 sites sensed.      Skin integrity: Skin integrity normal. No ulcer or callus.      Toenail Condition: Right toenails are normal.      Left foot:      Protective Sensation: 6 sites tested.  6 sites sensed.      Skin integrity: Skin integrity normal. No ulcer or callus.      Toenail Condition: Left toenails are normal.   Skin:     General: Skin is warm and dry.   Neurological:      General: No focal deficit  present.      Mental Status: She is alert and oriented to person, place, and time.   Psychiatric:         Mood and Affect: Mood normal.         Behavior: Behavior normal.         Thought Content: Thought content normal.         Judgment: Judgment normal.       Assessment:       Problem List Items Addressed This Visit          Cardiac/Vascular    HTN (hypertension)    Hyperlipidemia       Endocrine    Diabetes mellitus, type 2 - Primary    Relevant Orders    Hemoglobin A1C, POCT (Completed)    POCT Glucose, Hand-Held Device (Completed)    Obesity         Plan:       Problem List Items Addressed This Visit          Cardiac/Vascular    HTN (hypertension)    Hyperlipidemia       Endocrine    Diabetes mellitus, type 2 - Primary    Relevant Orders    Hemoglobin A1C, POCT (Completed)    POCT Glucose, Hand-Held Device (Completed)    Obesity

## 2022-11-30 NOTE — PATIENT INSTRUCTIONS
Ensure to restart farxiga and ozempic and take mealtime insulin with supper.    Check glucose before all meals and follow sliding scale.      Check glucose also before bed and have a 15 carb snack that has protein 8-10grams preferably.     Bring you meter with you to all appointments. Snacks with 0-5 grams carbs   Hard Boiled Egg   Crystal Light, Vitamin Water, Powerade Zero   Herbal tea, unsweetened   8 oz unsweetened almond milk   2 tsp peanut butter on celery   ½ cup sugar-free Jell-O   1 sugar-free popsicle   Non starchy vegetables such as carrots or celery sticks with lowfat dressing   ½ oz lowfat cheese or string cheese   1 closed handful of nuts or tbsp of seeds, unsalted    Snacks with 15 gram carbs  . 1 small piece of fruit or . banana or . cup light canned fruit  . 3 aftab cracker squares  . 3 cups popcorn  . 5 Vanilla Wafers  . 1/2 cup low fat, no added sugar ice cream or frozen yogurt  . 1/2 turkey, ham, or chicken sandwich  . 1.2 cup fruit with 1/2 cup of cottage cheese  . 4-6 unsalted wheat crackers with 1 oz low fat cheese or 1 tbsp peanut butter  . 30 goldfish crackers  . 7-8 mini rice cakes  . 1/3 cup hummus dip with raw vegetables  . 1/2 whole wheat lorraine, 1 tbsp hummus  . Mini pizza (. whole wheat English muffin, low-fat cheese, tomato sauce)  . 100 calorie snack pack  . 4-6 oz light yogurt  . 1/2 cup sugar-free pudding

## 2023-02-22 ENCOUNTER — OFFICE VISIT (OUTPATIENT)
Dept: PRIMARY CARE CLINIC | Facility: CLINIC | Age: 43
End: 2023-02-22
Payer: COMMERCIAL

## 2023-02-22 VITALS
DIASTOLIC BLOOD PRESSURE: 82 MMHG | TEMPERATURE: 98 F | BODY MASS INDEX: 47.09 KG/M2 | RESPIRATION RATE: 18 BRPM | SYSTOLIC BLOOD PRESSURE: 122 MMHG | HEIGHT: 66 IN | HEART RATE: 94 BPM | WEIGHT: 293 LBS | OXYGEN SATURATION: 97 %

## 2023-02-22 DIAGNOSIS — Z11.4 SCREENING FOR HIV (HUMAN IMMUNODEFICIENCY VIRUS): ICD-10-CM

## 2023-02-22 DIAGNOSIS — F32.A DEPRESSION, UNSPECIFIED DEPRESSION TYPE: ICD-10-CM

## 2023-02-22 DIAGNOSIS — Z11.59 NEED FOR HEPATITIS C SCREENING TEST: ICD-10-CM

## 2023-02-22 DIAGNOSIS — Z23 NEED FOR VACCINATION: ICD-10-CM

## 2023-02-22 DIAGNOSIS — Z00.00 ENCOUNTER FOR GENERAL ADULT MEDICAL EXAMINATION WITHOUT ABNORMAL FINDINGS: Primary | ICD-10-CM

## 2023-02-22 DIAGNOSIS — E11.65 TYPE 2 DIABETES MELLITUS WITH HYPERGLYCEMIA, WITHOUT LONG-TERM CURRENT USE OF INSULIN: ICD-10-CM

## 2023-02-22 DIAGNOSIS — E11.9 DIABETES MELLITUS WITHOUT COMPLICATION: ICD-10-CM

## 2023-02-22 DIAGNOSIS — I10 ESSENTIAL HYPERTENSION, BENIGN: ICD-10-CM

## 2023-02-22 PROCEDURE — 3079F PR MOST RECENT DIASTOLIC BLOOD PRESSURE 80-89 MM HG: ICD-10-PCS | Mod: CPTII,,, | Performed by: NURSE PRACTITIONER

## 2023-02-22 PROCEDURE — 90471 IMMUNIZATION ADMIN: CPT | Mod: ,,, | Performed by: NURSE PRACTITIONER

## 2023-02-22 PROCEDURE — 1160F PR REVIEW ALL MEDS BY PRESCRIBER/CLIN PHARMACIST DOCUMENTED: ICD-10-PCS | Mod: CPTII,,, | Performed by: NURSE PRACTITIONER

## 2023-02-22 PROCEDURE — 99396 PREV VISIT EST AGE 40-64: CPT | Mod: 25,,, | Performed by: NURSE PRACTITIONER

## 2023-02-22 PROCEDURE — 1160F RVW MEDS BY RX/DR IN RCRD: CPT | Mod: CPTII,,, | Performed by: NURSE PRACTITIONER

## 2023-02-22 PROCEDURE — 1159F PR MEDICATION LIST DOCUMENTED IN MEDICAL RECORD: ICD-10-PCS | Mod: CPTII,,, | Performed by: NURSE PRACTITIONER

## 2023-02-22 PROCEDURE — 3008F BODY MASS INDEX DOCD: CPT | Mod: CPTII,,, | Performed by: NURSE PRACTITIONER

## 2023-02-22 PROCEDURE — 3079F DIAST BP 80-89 MM HG: CPT | Mod: CPTII,,, | Performed by: NURSE PRACTITIONER

## 2023-02-22 PROCEDURE — 99396 PR PREVENTIVE VISIT,EST,40-64: ICD-10-PCS | Mod: 25,,, | Performed by: NURSE PRACTITIONER

## 2023-02-22 PROCEDURE — 90677 PCV20 VACCINE IM: CPT | Mod: ,,, | Performed by: NURSE PRACTITIONER

## 2023-02-22 PROCEDURE — 90471 PNEUMOCOCCAL CONJUGATE VACCINE 20-VALENT: ICD-10-PCS | Mod: ,,, | Performed by: NURSE PRACTITIONER

## 2023-02-22 PROCEDURE — 3008F PR BODY MASS INDEX (BMI) DOCUMENTED: ICD-10-PCS | Mod: CPTII,,, | Performed by: NURSE PRACTITIONER

## 2023-02-22 PROCEDURE — 1159F MED LIST DOCD IN RCRD: CPT | Mod: CPTII,,, | Performed by: NURSE PRACTITIONER

## 2023-02-22 PROCEDURE — 90677 PNEUMOCOCCAL CONJUGATE VACCINE 20-VALENT: ICD-10-PCS | Mod: ,,, | Performed by: NURSE PRACTITIONER

## 2023-02-22 PROCEDURE — 3074F SYST BP LT 130 MM HG: CPT | Mod: CPTII,,, | Performed by: NURSE PRACTITIONER

## 2023-02-22 PROCEDURE — 3074F PR MOST RECENT SYSTOLIC BLOOD PRESSURE < 130 MM HG: ICD-10-PCS | Mod: CPTII,,, | Performed by: NURSE PRACTITIONER

## 2023-02-22 RX ORDER — DAPAGLIFLOZIN 10 MG/1
10 TABLET, FILM COATED ORAL DAILY
Qty: 90 TABLET | Refills: 3 | Status: SHIPPED | OUTPATIENT
Start: 2023-02-22 | End: 2023-11-14

## 2023-02-22 RX ORDER — LOSARTAN POTASSIUM 100 MG/1
100 TABLET ORAL DAILY
Qty: 90 TABLET | Refills: 3 | Status: SHIPPED | OUTPATIENT
Start: 2023-02-22 | End: 2024-02-21 | Stop reason: SDUPTHER

## 2023-02-22 RX ORDER — INSULIN DETEMIR 100 [IU]/ML
32 INJECTION, SOLUTION SUBCUTANEOUS NIGHTLY
Qty: 30 ML | Refills: 3 | Status: SHIPPED | OUTPATIENT
Start: 2023-02-22 | End: 2023-11-14

## 2023-02-22 RX ORDER — AMLODIPINE BESYLATE 5 MG/1
5 TABLET ORAL DAILY
Qty: 90 TABLET | Refills: 3 | Status: SHIPPED | OUTPATIENT
Start: 2023-02-22 | End: 2024-02-21 | Stop reason: SDUPTHER

## 2023-02-22 RX ORDER — BUSPIRONE HYDROCHLORIDE 10 MG/1
10 TABLET ORAL 3 TIMES DAILY
Qty: 270 TABLET | Refills: 3 | Status: SHIPPED | OUTPATIENT
Start: 2023-02-22 | End: 2024-03-16

## 2023-02-22 RX ORDER — ATORVASTATIN CALCIUM 20 MG/1
20 TABLET, FILM COATED ORAL DAILY
Qty: 90 TABLET | Refills: 3 | Status: SHIPPED | OUTPATIENT
Start: 2023-02-22 | End: 2024-02-21 | Stop reason: SDUPTHER

## 2023-02-22 RX ORDER — SEMAGLUTIDE 1.34 MG/ML
0.5 INJECTION, SOLUTION SUBCUTANEOUS
Qty: 3 PEN | Refills: 3 | Status: SHIPPED | OUTPATIENT
Start: 2023-02-22 | End: 2023-11-14

## 2023-02-22 RX ORDER — INSULIN ASPART 100 [IU]/ML
INJECTION, SOLUTION INTRAVENOUS; SUBCUTANEOUS
Qty: 30 ML | Refills: 3 | Status: SHIPPED | OUTPATIENT
Start: 2023-02-22 | End: 2024-02-21

## 2023-02-22 NOTE — PROGRESS NOTES
Subjective:       Patient ID: Ashly Trent is a 42 y.o. female.    Chief Complaint: Wellness visit    Pt presents for a wellness visit with labs and med refills on the 340B plan.     Review of Systems   Constitutional:  Negative for activity change, appetite change, chills, fatigue and fever.   HENT:  Negative for nasal congestion, ear discharge, nosebleeds, postnasal drip, rhinorrhea, sinus pressure/congestion, sneezing, sore throat and tinnitus.    Eyes:  Negative for pain, discharge, redness and itching.   Respiratory:  Negative for cough, choking, chest tightness, shortness of breath and wheezing.    Cardiovascular:  Negative for chest pain.   Gastrointestinal:  Negative for abdominal distention, abdominal pain, blood in stool, change in bowel habit, constipation, diarrhea, nausea, vomiting and change in bowel habit.   Genitourinary:  Negative for decreased urine volume, dysuria, flank pain, frequency, menstrual irregularity and menstrual problem.   Musculoskeletal:  Negative for back pain and gait problem.   Integumentary:  Negative for wound, breast mass and breast discharge.   Allergic/Immunologic: Negative for immunocompromised state.   Neurological:  Negative for dizziness, light-headedness and headaches.   Psychiatric/Behavioral:  Negative for agitation, behavioral problems and hallucinations.    Breast: Negative for mass      Objective:      Physical Exam  Vitals and nursing note reviewed.   Constitutional:       Appearance: Normal appearance.   Cardiovascular:      Rate and Rhythm: Normal rate and regular rhythm.      Heart sounds: Normal heart sounds.   Pulmonary:      Effort: Pulmonary effort is normal.      Breath sounds: Normal breath sounds.   Musculoskeletal:         General: Normal range of motion.   Neurological:      Mental Status: She is alert and oriented to person, place, and time.   Psychiatric:         Mood and Affect: Mood normal.         Behavior: Behavior normal.       Assessment:        Problem List Items Addressed This Visit          Psychiatric    Depression    Relevant Medications    busPIRone (BUSPAR) 10 MG tablet       Endocrine    Diabetes mellitus, type 2    Relevant Medications    semaglutide (OZEMPIC) 0.25 mg or 0.5 mg(2 mg/1.5 mL) pen injector    insulin detemir U-100 (LEVEMIR FLEXTOUCH U-100 INSULN) 100 unit/mL (3 mL) InPn pen    insulin aspart U-100 (NOVOLOG) 100 unit/mL (3 mL) InPn pen    dapagliflozin (FARXIGA) 10 mg tablet    atorvastatin (LIPITOR) 20 MG tablet    Other Relevant Orders    CBC Auto Differential    Comprehensive Metabolic Panel    Lipid Panel    TSH    Microalbumin/Creatinine Ratio, Urine     Other Visit Diagnoses       Encounter for general adult medical examination without abnormal findings    -  Primary    Diabetes mellitus without complication        Relevant Medications    semaglutide (OZEMPIC) 0.25 mg or 0.5 mg(2 mg/1.5 mL) pen injector    insulin detemir U-100 (LEVEMIR FLEXTOUCH U-100 INSULN) 100 unit/mL (3 mL) InPn pen    insulin aspart U-100 (NOVOLOG) 100 unit/mL (3 mL) InPn pen    Essential hypertension, benign        Relevant Medications    amLODIPine (NORVASC) 5 MG tablet    losartan (COZAAR) 100 MG tablet    Need for hepatitis C screening test        Relevant Orders    Hepatitis C Antibody    Screening for HIV (human immunodeficiency virus)        Relevant Orders    HIV 1/2 Ag/Ab (4th Gen)    Need for vaccination        Relevant Orders    (In Office Administered) Pneumococcal Conjugate Vaccine (20 Valent) (IM) (Completed)            Plan:       Will call pt with lab results. Keep appointments with Lalitha Hahn. She will have her next eye visit sent here.

## 2023-03-22 ENCOUNTER — OFFICE VISIT (OUTPATIENT)
Dept: DIABETES SERVICES | Facility: CLINIC | Age: 43
End: 2023-03-22
Payer: COMMERCIAL

## 2023-03-22 VITALS
BODY MASS INDEX: 47.09 KG/M2 | DIASTOLIC BLOOD PRESSURE: 82 MMHG | HEART RATE: 102 BPM | SYSTOLIC BLOOD PRESSURE: 130 MMHG | OXYGEN SATURATION: 98 % | HEIGHT: 66 IN | WEIGHT: 293 LBS | RESPIRATION RATE: 14 BRPM

## 2023-03-22 DIAGNOSIS — Z79.4 TYPE 2 DIABETES MELLITUS WITH HYPERGLYCEMIA, WITH LONG-TERM CURRENT USE OF INSULIN: Primary | ICD-10-CM

## 2023-03-22 DIAGNOSIS — E11.65 TYPE 2 DIABETES MELLITUS WITH HYPERGLYCEMIA, WITH LONG-TERM CURRENT USE OF INSULIN: Primary | ICD-10-CM

## 2023-03-22 DIAGNOSIS — E78.5 HYPERLIPIDEMIA, UNSPECIFIED HYPERLIPIDEMIA TYPE: ICD-10-CM

## 2023-03-22 DIAGNOSIS — I10 PRIMARY HYPERTENSION: ICD-10-CM

## 2023-03-22 LAB
GLUCOSE SERPL-MCNC: 151 MG/DL (ref 70–110)
HBA1C MFR BLD: 10 % (ref 4.5–6.6)

## 2023-03-22 PROCEDURE — 3066F NEPHROPATHY DOC TX: CPT | Mod: CPTII,,, | Performed by: NURSE PRACTITIONER

## 2023-03-22 PROCEDURE — 3075F PR MOST RECENT SYSTOLIC BLOOD PRESS GE 130-139MM HG: ICD-10-PCS | Mod: CPTII,,, | Performed by: NURSE PRACTITIONER

## 2023-03-22 PROCEDURE — 3008F BODY MASS INDEX DOCD: CPT | Mod: CPTII,,, | Performed by: NURSE PRACTITIONER

## 2023-03-22 PROCEDURE — 1159F PR MEDICATION LIST DOCUMENTED IN MEDICAL RECORD: ICD-10-PCS | Mod: CPTII,,, | Performed by: NURSE PRACTITIONER

## 2023-03-22 PROCEDURE — 83036 HEMOGLOBIN GLYCOSYLATED A1C: CPT | Mod: PBBFAC | Performed by: NURSE PRACTITIONER

## 2023-03-22 PROCEDURE — 3079F DIAST BP 80-89 MM HG: CPT | Mod: CPTII,,, | Performed by: NURSE PRACTITIONER

## 2023-03-22 PROCEDURE — 99214 OFFICE O/P EST MOD 30 MIN: CPT | Mod: S$PBB,,, | Performed by: NURSE PRACTITIONER

## 2023-03-22 PROCEDURE — 3079F PR MOST RECENT DIASTOLIC BLOOD PRESSURE 80-89 MM HG: ICD-10-PCS | Mod: CPTII,,, | Performed by: NURSE PRACTITIONER

## 2023-03-22 PROCEDURE — 3061F PR NEG MICROALBUMINURIA RESULT DOCUMENTED/REVIEW: ICD-10-PCS | Mod: CPTII,,, | Performed by: NURSE PRACTITIONER

## 2023-03-22 PROCEDURE — 4010F PR ACE/ARB THEARPY RXD/TAKEN: ICD-10-PCS | Mod: CPTII,,, | Performed by: NURSE PRACTITIONER

## 2023-03-22 PROCEDURE — 1159F MED LIST DOCD IN RCRD: CPT | Mod: CPTII,,, | Performed by: NURSE PRACTITIONER

## 2023-03-22 PROCEDURE — 3008F PR BODY MASS INDEX (BMI) DOCUMENTED: ICD-10-PCS | Mod: CPTII,,, | Performed by: NURSE PRACTITIONER

## 2023-03-22 PROCEDURE — 82962 GLUCOSE BLOOD TEST: CPT | Mod: PBBFAC | Performed by: NURSE PRACTITIONER

## 2023-03-22 PROCEDURE — 3061F NEG MICROALBUMINURIA REV: CPT | Mod: CPTII,,, | Performed by: NURSE PRACTITIONER

## 2023-03-22 PROCEDURE — 99214 PR OFFICE/OUTPT VISIT, EST, LEVL IV, 30-39 MIN: ICD-10-PCS | Mod: S$PBB,,, | Performed by: NURSE PRACTITIONER

## 2023-03-22 PROCEDURE — 4010F ACE/ARB THERAPY RXD/TAKEN: CPT | Mod: CPTII,,, | Performed by: NURSE PRACTITIONER

## 2023-03-22 PROCEDURE — 3075F SYST BP GE 130 - 139MM HG: CPT | Mod: CPTII,,, | Performed by: NURSE PRACTITIONER

## 2023-03-22 PROCEDURE — 3066F PR DOCUMENTATION OF TREATMENT FOR NEPHROPATHY: ICD-10-PCS | Mod: CPTII,,, | Performed by: NURSE PRACTITIONER

## 2023-03-22 PROCEDURE — 99214 OFFICE O/P EST MOD 30 MIN: CPT | Mod: PBBFAC | Performed by: NURSE PRACTITIONER

## 2023-03-22 NOTE — PROGRESS NOTES
Subjective:       Patient ID: Ashly Trent is a 42 y.o. female.    Chief Complaint: General Diabetes Follow-up (Type 2 diabetes mellitus with hyperglycemia, with long term current use of insulin.  Checks sugar 3x a day )    Here today for routine evaluation and med refill.  Reports that am readings are usually over 230.  Prelunch usually 150s and above, pre-supper usually in the 100s. She reports that she is giving insulin as she should.  Usually giving 6 unit in the am and between 4 and 6 units before other meals,  taking 32 units of levemir daily.       She is on ozempic 0.5 on the 340B program, unable to afford medications using insurance at this time due to high deductible.     Hemoglobin A1C       Date                     Value               Ref Range           Status                03/22/2023               10.0 (A)            4.5 - 6.6 %         Final                 11/30/2022               10.1 (A)            4.5 - 6.6 %         Final                 08/22/2022               11.3 (A)            4.5 - 6.6 %         Final            ----------  Lab Results       Component                Value               Date                       MICROALBUR               0.7                 02/22/2023            Lab Results       Component                Value               Date                       CHOL                     147                 02/22/2023                 CHOL                     124                 02/17/2022                 CHOL                     166                 09/22/2021            Lab Results       Component                Value               Date                       HDL                      60                  02/22/2023                 HDL                      51                  02/17/2022                 HDL                      55                  09/22/2021            Lab Results       Component                Value               Date                       LDLCALC                  67                   02/22/2023                 LDLCALC                  52                  02/17/2022                 LDLCALC                  83                  09/22/2021            No results found for: DLDL  Lab Results       Component                Value               Date                       TRIG                     98                  02/22/2023                 TRIG                     105                 02/17/2022                 TRIG                     139                 09/22/2021              f1 Lab Results       Component                Value               Date                       CHOLHDL                  2.5                 02/22/2023                 CHOLHDL                  2.4                 02/17/2022                 CHOLHDL                  3.0                 09/22/2021            CMP  Sodium       Date                     Value               Ref Range           Status                02/22/2023               135 (L)             136 - 145 mmol*     Final            ----------  Potassium       Date                     Value               Ref Range           Status                02/22/2023               4.3                 3.5 - 5.1 mmol*     Final            ----------  Chloride       Date                     Value               Ref Range           Status                02/22/2023               105                 98 - 107 mmol/L     Final            ----------  CO2       Date                     Value               Ref Range           Status                02/22/2023               25                  21 - 32 mmol/L      Final            ----------  Glucose       Date                     Value               Ref Range           Status                02/22/2023               167 (H)             74 - 106 mg/dL      Final            ----------  BUN       Date                     Value               Ref Range           Status                02/22/2023               12                  7 - 18 mg/dL         Final            ----------  Creatinine       Date                     Value               Ref Range           Status                02/22/2023               0.82                0.55 - 1.02 mg*     Final            ----------  Calcium       Date                     Value               Ref Range           Status                02/22/2023               9.4                 8.5 - 10.1 mg/*     Final            ----------  Total Protein       Date                     Value               Ref Range           Status                02/22/2023               8.4 (H)             6.4 - 8.2 g/dL      Final            ----------  Albumin       Date                     Value               Ref Range           Status                02/22/2023               3.9                 3.5 - 5.0 g/dL      Final            ----------  Bilirubin, Total       Date                     Value               Ref Range           Status                02/22/2023               0.9                 >0.0 - 1.2 mg/*     Final            ----------  Alk Phos       Date                     Value               Ref Range           Status                02/22/2023               142 (H)             37 - 98 U/L         Final            ----------  AST       Date                     Value               Ref Range           Status                02/22/2023               23                  15 - 37 U/L         Final            ----------  ALT       Date                     Value               Ref Range           Status                02/22/2023               27                  13 - 56 U/L         Final            ----------  Anion Gap       Date                     Value               Ref Range           Status                02/22/2023               9                   7 - 16 mmol/L       Final            ----------  eGFR       Date                     Value               Ref Range           Status                02/22/2023               93                  >=60  mL/min/1.*     Final            ----------           Review of Systems   Constitutional:  Negative for activity change, appetite change, diaphoresis and fatigue.   HENT:  Negative for nasal congestion, facial swelling and sinus pressure/congestion.    Eyes:  Negative for visual disturbance.   Respiratory:  Negative for shortness of breath and wheezing.    Cardiovascular:  Negative for chest pain and leg swelling.   Gastrointestinal:  Negative for constipation, diarrhea, nausea and vomiting.   Endocrine: Negative for polydipsia, polyphagia and polyuria.   Genitourinary:  Negative for dysuria, frequency and urgency.   Musculoskeletal:  Negative for gait problem and myalgias.   Integumentary:  Negative for color change, rash and wound.   Neurological:  Negative for dizziness, syncope, weakness, headaches, coordination difficulties and coordination difficulties.   Hematological:  Does not bruise/bleed easily.   Psychiatric/Behavioral:  Negative for self-injury, sleep disturbance and suicidal ideas. The patient is not nervous/anxious.        Objective:      Physical Exam  Vitals and nursing note reviewed.   Constitutional:       Appearance: Normal appearance.   HENT:      Head: Normocephalic.   Neck:      Thyroid: No thyromegaly.      Vascular: No carotid bruit.   Cardiovascular:      Rate and Rhythm: Normal rate and regular rhythm.      Pulses:           Dorsalis pedis pulses are 3+ on the right side and 3+ on the left side.        Posterior tibial pulses are 3+ on the right side and 3+ on the left side.      Heart sounds: Normal heart sounds.   Pulmonary:      Effort: Pulmonary effort is normal.      Breath sounds: Normal breath sounds.   Musculoskeletal:         General: Normal range of motion.      Right foot: Normal range of motion. No deformity.      Left foot: Normal range of motion. No deformity.   Feet:      Right foot:      Protective Sensation: 6 sites tested.  6 sites sensed.      Skin integrity: Skin  integrity normal. No ulcer or callus.      Toenail Condition: Right toenails are normal.      Left foot:      Protective Sensation: 6 sites tested.  6 sites sensed.      Skin integrity: Skin integrity normal. No ulcer or callus.      Toenail Condition: Left toenails are normal.   Skin:     General: Skin is warm and dry.   Neurological:      General: No focal deficit present.      Mental Status: She is alert and oriented to person, place, and time.   Psychiatric:         Mood and Affect: Mood normal.         Behavior: Behavior normal.         Thought Content: Thought content normal.         Judgment: Judgment normal.       Assessment:       Problem List Items Addressed This Visit          Cardiac/Vascular    HTN (hypertension)    Hyperlipidemia       Endocrine    Diabetes mellitus, type 2 - Primary    Relevant Orders    Hemoglobin A1C, POCT (Completed)    POCT Glucose, Hand-Held Device (Completed)             Plan:       1. Type 2 diabetes mellitus with hyperglycemia, with long-term current use of insulin  We will try to do pt assistance for ozempic.  Provided paperwork and advised her to fill out and bring back with appropriate information asap.  Increase levemir by 2 units every 2 days until am readings are 130s.  Add 2 units to ss  -     Hemoglobin A1C, POCT  -     POCT Glucose, Hand-Held Device    2. Primary hypertension  ARB coverage    3. Hyperlipidemia, unspecified hyperlipidemia type  Statin coverage

## 2023-03-22 NOTE — PATIENT INSTRUCTIONS
Return with paperwork for pt assistance    Increase levemir by 2 units every 2 days until am readings are 130s.    Add 2 units to sliding scale before all meals.     Low Carbohydrate snacks/quick meals    Ham and cheese rollups - slice of ham wrapped around a string cheese/cheese slice. (0 gm carb)  Cucumber boats (stuffed with chicken salad or tuna salad - no fruit or sweet pickle in salad) (0 gm carb)  Celery and Peanut Butter (2 stalks with 1 Tbsp PNB = 6 gm carb)  Nuts - mixed (1/4 cup = 6 gm carb)  Sunflower seeds- without shell (1/4 cup = 7 gm carb) In the shell (1 cup = 3.3 gm carb)  Deviled eggs (no sweet pickles) - (0 gm carb)  Hard boiled eggs - (0 gm carb)  Guacamole with raw vegetables (mashed avocado with lime juice and seasoning to taste) (1 cup raw veg = 5 gm carb)  Ranch dressing with raw vegetables -(2 Tbsp Ranch = 2 gm carb, ½ cup raw veggies = 2.5 gm carb  Lasagna rolls- Slice zucchini thinly lengthwise and microwave for 1-2 minutes. Fill with ricotta, parmesan cheese. Roll and top with low carb tomato sauce. (5 rolls = 5 gm carb)  Crust less pizza -pepperoni or [place] liu topped with cheese and veggies +1 tbsp tomato sauce, microwave (1 gm carb)  Beef jerky - read label for lower carb jerky  Olives - Black (5 olives = 1 gm carb) Green (5 olives = 1 gm carb)  Dill pickles (1 whole dill pickle = 2 gm carb)  Sugar free jell-o (0 gm carb)  [place] Chicken Marinate chicken strips in 2 Tbsp sugar free maple syrup, 1 Tbsp lime juice, 1 Tbsp fresh cilantro. [place] or cook in skillet sprayed with oil. (0 gm carb)  Chicken nachos - Heat oven to 350. Rub 5-6 chicken tenders with 1 Tbsp Z[place] Taco seasoning then drizzle with vegetable oil. Bake at 350 for 3-4 min and then flip and cook 3-4 min. Top with grated cheese, jalopenos, liu, green onion. Place back in oven at temp of 425 for 3-4 minutes. Serve with side of 2 Tbsp ranch dressing or sour cream. (3 gm carb)  Egg Mcmuffin: using egg (or egg white  for a healthier version) as the bread put one slice of cheese with 1 slice [place] liu or sausage ayesha (1 gm carb per sandwich)  Southern Okra - Wash and dry fresh okra. Toss with olive oil, salt and pepper and roast in oven 10-20 minutes. (1 cup = 5 gm carb)  Crispy green beans - Jitendra 5 lbs fresh green beans. Toss with 1/3 cup melted coconut oil and sprinkle with 4 tsp salt and 1 tsp onion and garlic powder. Bake at 170-175 for 8 hours or dehydrate overnight in . (1 cup = 5 gm carb)  Salt and vinegar zucchini chips - Thinly slice zucchini as thin as possible. In small bowl whisk 2 Tbsp olive oil, 2 Tbsp white vinegar, and 2 tsp sea salt. Add zucchini and dehydrate or bake on 170 for 3-4 hours till crispy. (½ cup = 3 gm carb). Make in large batches and keep in air tight container up to 1 week   Zucchini Z[place] chips - Thinly slice zucchini as thin as possible. Heat oil in fryer to 350 and drop zucchini in hot oil working in batches of about 20 chips at a time. Remove, drain and sprinkle with Z[place] Taco seasoning. (1/2 cup = 3 gm carb). Make in large batches and keep in air tight container up to 1 week.  Zaire Taco Seasoning - 2 Tbsp chili powder, ½ tsp garlic powder, ½ tsp onion powder, ½ tsp crushed red pepper flakes, ½ tsp dried oregano, 3 tsp ground cumin, 2 tsp sea salt, 2 tsp black pepper. Makes 20 servings (0 gm carb)  [place] milk (1 cup almond milk = 0.3 gm carb)  Cheese chips - Preheat oven 400. On a nonstick baking sheet add cheese slices (Cheddar, Swiss, Provolone, Thanh Gato), bake 10-12 minutes or until browned. Cool completely before removal. (2 slices = 1 gm carb). Store in air tight container up to 1 week.   Breakfast balls - mix together 2 lbs pork sausage, 1 lb ground beef, 3 eggs, 2 Tbsp dried onion flakes, ½ tsp black pepper, ½ lb sharp cheddar cheese, shredded. Form into 4 dozen 1 balls. Bake on cookie sheet for 25 min at 375. Cool and may be frozen as well individually. (0 gm  carb)  Meat muffins - spray muffin tin with cooking spray. Line muffin tin with 1 slice ham. Add 1 raw egg. Top with grated cheese and salt and pepper. Bake 10-12 min. (0 gm carb)  Pork rinds/Cracklings (0 gm carb)  Gummy Bears - Add 1 packet of Sugar free jello (flavor of your choice), 1 packet unflavored gelatin and 1/3 cup cold water to small sauce pan. Stir well and heat over low till it become liquid and dissolved (2-3 minutes). Pour into mold and refrigerate 30-40 minutes. Add only ¼ cup if you want them more firm. (0 gm carb)  Cheese and meat kabobs (0 gm carb)  Garlic parmesan cheese crisps - Preheat oven to 350. On a nonstick baking sheet, place 1 Tbsp grated parmesan cheese, sprinkle with garlic and basil. Bake about 5 minutes or until outside edges become pederson brown. Cool completely before removal (5 crisps = 1 gm carb)  Antipasti - Pepperoni, salami, green pepper, jalapeno pepper, mushrooms, onion, black olives, cheddar and provolone cheese cubes. Toss with Italian salad dressing. (1/2 cup = 5 gm carb)  [place] chicken wings - (0 gm carb)  Cheetos- preheat oven to 300. Chop ½ cup freshly shredded cheddar cheese that is frozen and place in  or  and chop into tiny pieces. In a mixing bowl, whip 3 egg whites and 1/8 tsp cream of tartar until VERY stiff peaks form. Sprinkle cheese on top of egg whites and then fold cheese into egg whites very carefully. Place mixture into large ziplock bag and cut hole in corner. Gently squeeze onto greased nonstick cookie pan in cheestos like shapes. Sprinkle with parmesan cheese. Bake for 30 minutes. Turn over off and leave in there for another 30 minutes. (1 cup = 1 gm carb)  Cheesy cauliflower tots - preheat oven to 400. Spray mini muffin tin with nonstick spray. Chop ½ large head of cauliflower into small pieces. Place in microwavable bowl and cover. Microwave 2 minutes. Drain cauliflower. Place in  and pulse till finely chopped.  To this add, 1/3 cup grated sharp cheddar, ¼ cup grated parmesan cheese, 2 Tbsp. almond flour, ¼ tsp salt, ½ tsp all purpose seasoning and 1 egg. Mix well. Place 1 Tbsp of mixture in each mini muffin tin. Bake 15 minutes. Remove and flip, bake another 15 minutes. Makes 24 tots. (10 tots = 5 gm carb)  [employer] protein bars (carbs vary)  Gato snacks - Preheat oven 350. 1 cup shredded Pepperjack paper. Scoop 1 Tbsp cheese. Place on non stick pan and press slightly flat. Top with 1 slice jalapeno pepper. Bake for 10-12 minutes till brown and crispy. Cool completely before removal. (10 crisps = 1 gm carb)  Snake bite (aka jalapeno poppers) - remove seeds and membrane from jalapenos, fill with cream cheese, wrap in liu. Stick a toothpick through to hold liu in place. Bake 15-20 min at 400 (4 bites = 2 gm carb)  5 minute PNB mousse - Beat together 4 oz softened cream cheese, 2 Tbsp natural PNB (no sugar added), ½ tsp vanilla extract and 1/3 cup Splenda. Fold in 1 cup Reddiwhip. Place in container of your choice and refrigerate up to 1 week. Top your serving with 1Tbsp Sugar free chocolate syrup. (1/2 cup = 4 gm carb)  BLT - Fill a leaf of dina lettuce leaf with 1 Tbsp LF monge, 2 slices liu, sliced tomato. Sprinkle salt and pepper. (0 gm carb)  Cinnamon and coconut bombs - in microwave safe bowl, mix 1 cup coconut butter, 1 cup coconut milk (full fat canned, not from box), 1 tsp vanilla extract, ½ tsp nutmeg and cinnamon, 1 tsp stevia powder extract. Melt until combined. Place bowl in fridge until hard enough to roll into 10-12 balls, about 30 minutes. Roll balls in 1 cup shredded coconut. Store in refrigerator (1 ball = 1 gm carb)

## 2023-06-03 ENCOUNTER — OFFICE VISIT (OUTPATIENT)
Dept: FAMILY MEDICINE | Facility: CLINIC | Age: 43
End: 2023-06-03
Payer: COMMERCIAL

## 2023-06-03 VITALS
OXYGEN SATURATION: 96 % | TEMPERATURE: 99 F | BODY MASS INDEX: 47.09 KG/M2 | HEIGHT: 66 IN | RESPIRATION RATE: 18 BRPM | SYSTOLIC BLOOD PRESSURE: 120 MMHG | WEIGHT: 293 LBS | DIASTOLIC BLOOD PRESSURE: 83 MMHG | HEART RATE: 102 BPM

## 2023-06-03 DIAGNOSIS — Z20.828 EXPOSURE TO VIRAL DISEASE: Primary | ICD-10-CM

## 2023-06-03 DIAGNOSIS — J06.9 UPPER RESPIRATORY INFECTION, VIRAL: ICD-10-CM

## 2023-06-03 LAB
CTP QC/QA: YES
FLUAV AG NPH QL: NEGATIVE
FLUBV AG NPH QL: NEGATIVE
SARS-COV-2 AG RESP QL IA.RAPID: NEGATIVE

## 2023-06-03 PROCEDURE — 3079F DIAST BP 80-89 MM HG: CPT | Mod: CPTII,,, | Performed by: FAMILY MEDICINE

## 2023-06-03 PROCEDURE — 3074F SYST BP LT 130 MM HG: CPT | Mod: CPTII,,, | Performed by: FAMILY MEDICINE

## 2023-06-03 PROCEDURE — 1159F PR MEDICATION LIST DOCUMENTED IN MEDICAL RECORD: ICD-10-PCS | Mod: CPTII,,, | Performed by: FAMILY MEDICINE

## 2023-06-03 PROCEDURE — 1159F MED LIST DOCD IN RCRD: CPT | Mod: CPTII,,, | Performed by: FAMILY MEDICINE

## 2023-06-03 PROCEDURE — 3079F PR MOST RECENT DIASTOLIC BLOOD PRESSURE 80-89 MM HG: ICD-10-PCS | Mod: CPTII,,, | Performed by: FAMILY MEDICINE

## 2023-06-03 PROCEDURE — 3061F PR NEG MICROALBUMINURIA RESULT DOCUMENTED/REVIEW: ICD-10-PCS | Mod: CPTII,,, | Performed by: FAMILY MEDICINE

## 2023-06-03 PROCEDURE — 87428 SARSCOV & INF VIR A&B AG IA: CPT | Mod: QW,,, | Performed by: FAMILY MEDICINE

## 2023-06-03 PROCEDURE — 3066F PR DOCUMENTATION OF TREATMENT FOR NEPHROPATHY: ICD-10-PCS | Mod: CPTII,,, | Performed by: FAMILY MEDICINE

## 2023-06-03 PROCEDURE — 3061F NEG MICROALBUMINURIA REV: CPT | Mod: CPTII,,, | Performed by: FAMILY MEDICINE

## 2023-06-03 PROCEDURE — 4010F PR ACE/ARB THEARPY RXD/TAKEN: ICD-10-PCS | Mod: CPTII,,, | Performed by: FAMILY MEDICINE

## 2023-06-03 PROCEDURE — 99051 MED SERV EVE/WKEND/HOLIDAY: CPT | Mod: ,,, | Performed by: FAMILY MEDICINE

## 2023-06-03 PROCEDURE — 3074F PR MOST RECENT SYSTOLIC BLOOD PRESSURE < 130 MM HG: ICD-10-PCS | Mod: CPTII,,, | Performed by: FAMILY MEDICINE

## 2023-06-03 PROCEDURE — 3008F PR BODY MASS INDEX (BMI) DOCUMENTED: ICD-10-PCS | Mod: CPTII,,, | Performed by: FAMILY MEDICINE

## 2023-06-03 PROCEDURE — 3008F BODY MASS INDEX DOCD: CPT | Mod: CPTII,,, | Performed by: FAMILY MEDICINE

## 2023-06-03 PROCEDURE — 99051 PR MEDICAL SERVICES, EVE/WKEND/HOLIDAY: ICD-10-PCS | Mod: ,,, | Performed by: FAMILY MEDICINE

## 2023-06-03 PROCEDURE — 99213 PR OFFICE/OUTPT VISIT, EST, LEVL III, 20-29 MIN: ICD-10-PCS | Mod: ,,, | Performed by: FAMILY MEDICINE

## 2023-06-03 PROCEDURE — 99213 OFFICE O/P EST LOW 20 MIN: CPT | Mod: ,,, | Performed by: FAMILY MEDICINE

## 2023-06-03 PROCEDURE — 4010F ACE/ARB THERAPY RXD/TAKEN: CPT | Mod: CPTII,,, | Performed by: FAMILY MEDICINE

## 2023-06-03 PROCEDURE — 87428 POCT SARS-COV2 (COVID) WITH FLU ANTIGEN: ICD-10-PCS | Mod: QW,,, | Performed by: FAMILY MEDICINE

## 2023-06-03 PROCEDURE — 3066F NEPHROPATHY DOC TX: CPT | Mod: CPTII,,, | Performed by: FAMILY MEDICINE

## 2023-06-03 RX ORDER — AZITHROMYCIN 250 MG/1
TABLET, FILM COATED ORAL
Qty: 6 TABLET | Refills: 0 | Status: SHIPPED | OUTPATIENT
Start: 2023-06-03 | End: 2023-11-14

## 2023-06-03 NOTE — PROGRESS NOTES
Subjective     Patient ID: Ashly Trent is a 42 y.o. female.    Chief Complaint: Sore Throat, Headache, and Generalized Body Aches (Symptoms x yesterday )    Patient says she really has not had a sore throat.  Just sinus drainage.  No cough.  No fever    Sore Throat   Associated symptoms include headaches.   Headache   Associated symptoms include a sore throat.   Review of Systems   HENT:  Positive for sore throat.    Neurological:  Positive for headaches.        Objective     Physical Exam  Constitutional:       General: She is not in acute distress.     Appearance: She is obese. She is not ill-appearing.   HENT:      Right Ear: Tympanic membrane normal.      Left Ear: Tympanic membrane normal.      Nose: Congestion and rhinorrhea present.      Mouth/Throat:      Pharynx: Posterior oropharyngeal erythema (Mild) present. No oropharyngeal exudate.   Cardiovascular:      Rate and Rhythm: Normal rate and regular rhythm.   Pulmonary:      Effort: Pulmonary effort is normal.      Breath sounds: Normal breath sounds.   Lymphadenopathy:      Cervical: No cervical adenopathy.   Skin:     Findings: No rash.   Neurological:      Mental Status: She is alert.          Assessment and Plan     1. Exposure to viral disease  -     POCT SARS-COV2 (COVID) with Flu Antigen    2. Upper respiratory infection, viral    Other orders  -     azithromycin (ZITHROMAX Z-REINA) 250 MG tablet; Two today then 1 daily  Dispense: 6 tablet; Refill: 0        Antibiotic will be used only if symptoms have not resolved in 2 days         No follow-ups on file.

## 2023-09-29 ENCOUNTER — HOSPITAL ENCOUNTER (EMERGENCY)
Facility: HOSPITAL | Age: 43
Discharge: HOME | End: 2023-09-29
Payer: COMMERCIAL

## 2023-09-29 VITALS
BODY MASS INDEX: 47.09 KG/M2 | WEIGHT: 293 LBS | HEIGHT: 66 IN | OXYGEN SATURATION: 99 % | RESPIRATION RATE: 16 BRPM | HEART RATE: 96 BPM | TEMPERATURE: 99 F | DIASTOLIC BLOOD PRESSURE: 87 MMHG | SYSTOLIC BLOOD PRESSURE: 145 MMHG

## 2023-09-29 DIAGNOSIS — R42 DIZZINESS: ICD-10-CM

## 2023-09-29 DIAGNOSIS — E11.65 HYPERGLYCEMIA DUE TO DIABETES MELLITUS: Primary | ICD-10-CM

## 2023-09-29 LAB
ALBUMIN SERPL BCP-MCNC: 3.5 G/DL (ref 3.5–5)
ALBUMIN/GLOB SERPL: 0.8 {RATIO}
ALP SERPL-CCNC: 148 U/L (ref 37–98)
ALT SERPL W P-5'-P-CCNC: 23 U/L (ref 13–56)
ANION GAP SERPL CALCULATED.3IONS-SCNC: 12 MMOL/L (ref 7–16)
AST SERPL W P-5'-P-CCNC: 19 U/L (ref 15–37)
BASOPHILS # BLD AUTO: 0.03 K/UL (ref 0–0.2)
BASOPHILS NFR BLD AUTO: 0.3 % (ref 0–1)
BILIRUB SERPL-MCNC: 0.5 MG/DL (ref ?–1.2)
BILIRUB UR QL STRIP: NEGATIVE
BUN SERPL-MCNC: 13 MG/DL (ref 7–18)
BUN/CREAT SERPL: 14 (ref 6–20)
CALCIUM SERPL-MCNC: 9 MG/DL (ref 8.5–10.1)
CHLORIDE SERPL-SCNC: 102 MMOL/L (ref 98–107)
CLARITY UR: CLEAR
CO2 SERPL-SCNC: 27 MMOL/L (ref 21–32)
COLOR UR: COLORLESS
CREAT SERPL-MCNC: 0.94 MG/DL (ref 0.55–1.02)
DIFFERENTIAL METHOD BLD: ABNORMAL
EGFR (NO RACE VARIABLE) (RUSH/TITUS): 79 ML/MIN/1.73M2
EOSINOPHIL # BLD AUTO: 0.15 K/UL (ref 0–0.5)
EOSINOPHIL NFR BLD AUTO: 1.6 % (ref 1–4)
ERYTHROCYTE [DISTWIDTH] IN BLOOD BY AUTOMATED COUNT: 13.4 % (ref 11.5–14.5)
FLUAV AG UPPER RESP QL IA.RAPID: NEGATIVE
FLUBV AG UPPER RESP QL IA.RAPID: NEGATIVE
GLOBULIN SER-MCNC: 4.4 G/DL (ref 2–4)
GLUCOSE SERPL-MCNC: 236 MG/DL (ref 70–105)
GLUCOSE SERPL-MCNC: 250 MG/DL (ref 74–106)
GLUCOSE UR STRIP-MCNC: >1000 MG/DL
HCT VFR BLD AUTO: 45 % (ref 38–47)
HGB BLD-MCNC: 14.5 G/DL (ref 12–16)
IMM GRANULOCYTES # BLD AUTO: 0.03 K/UL (ref 0–0.04)
IMM GRANULOCYTES NFR BLD: 0.3 % (ref 0–0.4)
KETONES UR STRIP-SCNC: NEGATIVE MG/DL
LEUKOCYTE ESTERASE UR QL STRIP: NEGATIVE
LYMPHOCYTES # BLD AUTO: 3.78 K/UL (ref 1–4.8)
LYMPHOCYTES NFR BLD AUTO: 39.9 % (ref 27–41)
MCH RBC QN AUTO: 29.4 PG (ref 27–31)
MCHC RBC AUTO-ENTMCNC: 32.2 G/DL (ref 32–36)
MCV RBC AUTO: 91.1 FL (ref 80–96)
MONOCYTES # BLD AUTO: 0.58 K/UL (ref 0–0.8)
MONOCYTES NFR BLD AUTO: 6.1 % (ref 2–6)
MPC BLD CALC-MCNC: 10.2 FL (ref 9.4–12.4)
MUCOUS, UA: ABNORMAL /LPF
NEUTROPHILS # BLD AUTO: 4.91 K/UL (ref 1.8–7.7)
NEUTROPHILS NFR BLD AUTO: 51.8 % (ref 53–65)
NITRITE UR QL STRIP: NEGATIVE
NRBC # BLD AUTO: 0 X10E3/UL
NRBC, AUTO (.00): 0 %
PH UR STRIP: 5 PH UNITS
PLATELET # BLD AUTO: 281 K/UL (ref 150–400)
POTASSIUM SERPL-SCNC: 4.1 MMOL/L (ref 3.5–5.1)
PROT SERPL-MCNC: 7.9 G/DL (ref 6.4–8.2)
PROT UR QL STRIP: NEGATIVE
RBC # BLD AUTO: 4.94 M/UL (ref 4.2–5.4)
RBC # UR STRIP: ABNORMAL /UL
RBC #/AREA URNS HPF: 15 /HPF
SARS-COV-2 RDRP RESP QL NAA+PROBE: NEGATIVE
SODIUM SERPL-SCNC: 137 MMOL/L (ref 136–145)
SP GR UR STRIP: 1.03
SQUAMOUS #/AREA URNS LPF: ABNORMAL /HPF
UROBILINOGEN UR STRIP-ACNC: NORMAL MG/DL
WBC # BLD AUTO: 9.48 K/UL (ref 4.5–11)
WBC #/AREA URNS HPF: 0 /HPF

## 2023-09-29 PROCEDURE — 80053 COMPREHEN METABOLIC PANEL: CPT | Performed by: NURSE PRACTITIONER

## 2023-09-29 PROCEDURE — 87635 SARS-COV-2 COVID-19 AMP PRB: CPT | Performed by: NURSE PRACTITIONER

## 2023-09-29 PROCEDURE — 81001 URINALYSIS AUTO W/SCOPE: CPT | Performed by: NURSE PRACTITIONER

## 2023-09-29 PROCEDURE — 93010 ELECTROCARDIOGRAM REPORT: CPT | Mod: ,,, | Performed by: HOSPITALIST

## 2023-09-29 PROCEDURE — 85025 COMPLETE CBC W/AUTO DIFF WBC: CPT | Performed by: NURSE PRACTITIONER

## 2023-09-29 PROCEDURE — 93005 ELECTROCARDIOGRAM TRACING: CPT

## 2023-09-29 PROCEDURE — 99284 EMERGENCY DEPT VISIT MOD MDM: CPT

## 2023-09-29 PROCEDURE — 82962 GLUCOSE BLOOD TEST: CPT

## 2023-09-29 PROCEDURE — 93010 EKG 12-LEAD: ICD-10-PCS | Mod: ,,, | Performed by: HOSPITALIST

## 2023-09-29 PROCEDURE — 99284 EMERGENCY DEPT VISIT MOD MDM: CPT | Mod: ,,, | Performed by: NURSE PRACTITIONER

## 2023-09-29 PROCEDURE — 99284 PR EMERGENCY DEPT VISIT,LEVEL IV: ICD-10-PCS | Mod: ,,, | Performed by: NURSE PRACTITIONER

## 2023-09-29 PROCEDURE — 87804 INFLUENZA ASSAY W/OPTIC: CPT | Performed by: NURSE PRACTITIONER

## 2023-09-29 RX ORDER — MECLIZINE HYDROCHLORIDE 25 MG/1
25 TABLET ORAL 3 TIMES DAILY PRN
Qty: 20 TABLET | Refills: 0 | Status: SHIPPED | OUTPATIENT
Start: 2023-09-29

## 2023-09-29 NOTE — ED TRIAGE NOTES
Presents to ED c/o dizziness and weakness that began today while at work, states she was turning to do something and got dizzy and almost fell. Denies recent sickness/illness.

## 2023-09-29 NOTE — ED PROVIDER NOTES
Encounter Date: 9/29/2023       History     Chief Complaint   Patient presents with    Dizziness    Weakness     42 year old female presents to ED with complaint of headache, weakness, dizziness, blurred vision, and nausea. Patient states symptoms started today while at work. She was attempting to do something at work and while turning got dizzy and almost fell. She reports she is a type 2 DM and checked her glucose and it was 236 when she checked it. Denies chest pain, shortness of breath, cough, congestion.     The history is provided by the patient.     Review of patient's allergies indicates:   Allergen Reactions    Amoxicillin Rash     Past Medical History:   Diagnosis Date    Acid reflux     Diabetes mellitus, type 2     HTN (hypertension)     Hyperlipidemia     Migraine with aura     Obesity      Past Surgical History:   Procedure Laterality Date    CHOLECYSTECTOMY      TONSILLECTOMY       Family History   Problem Relation Age of Onset    Asthma Mother     Diabetes Mother     Hypertension Mother     Hypertension Father     Diabetes Paternal Grandmother     Heart disease Paternal Grandfather     Hypertension Sister     No Known Problems Brother     No Known Problems Brother      Social History     Tobacco Use    Smoking status: Never    Smokeless tobacco: Never   Substance Use Topics    Alcohol use: Yes    Drug use: Not Currently     Review of Systems   Constitutional:  Negative for chills and fever.   HENT:  Negative for sinus pressure and sinus pain.    Eyes:  Positive for visual disturbance. Negative for photophobia.   Respiratory:  Negative for cough and shortness of breath.    Cardiovascular:  Negative for chest pain and palpitations.   Gastrointestinal:  Positive for nausea. Negative for vomiting.   Genitourinary:  Negative for difficulty urinating and dysuria.   Musculoskeletal:  Negative for arthralgias and gait problem.   Allergic/Immunologic: Negative for environmental allergies and food allergies.    Neurological:  Positive for dizziness and weakness.   Hematological:  Negative for adenopathy. Does not bruise/bleed easily.   Psychiatric/Behavioral:  Negative for agitation and confusion.    All other systems reviewed and are negative.      Physical Exam     Initial Vitals [09/29/23 1244]   BP Pulse Resp Temp SpO2   128/76 92 19 98.6 °F (37 °C) 99 %      MAP       --         Physical Exam    Nursing note and vitals reviewed.  Constitutional: She appears well-developed and well-nourished.   HENT:   Head: Normocephalic and atraumatic.   Eyes: EOM are normal. Pupils are equal, round, and reactive to light.   Neck: Neck supple.   Normal range of motion.  Cardiovascular:  Normal rate and regular rhythm.           No murmur heard.  Pulmonary/Chest: She has no wheezes. She has no rales.   Abdominal: Abdomen is soft. She exhibits no distension. There is no abdominal tenderness.   Musculoskeletal:         General: No tenderness or edema.      Cervical back: Normal range of motion and neck supple.     Lymphadenopathy:     She has no cervical adenopathy.   Neurological: She is alert and oriented to person, place, and time. No cranial nerve deficit or sensory deficit.   Skin: Skin is warm and dry. Capillary refill takes less than 2 seconds.   Psychiatric: She has a normal mood and affect. Thought content normal.         Medical Screening Exam   See Full Note    ED Course   Procedures  Labs Reviewed   COMPREHENSIVE METABOLIC PANEL - Abnormal; Notable for the following components:       Result Value    Glucose 250 (*)     Globulin 4.4 (*)     Alk Phos 148 (*)     All other components within normal limits   URINALYSIS, REFLEX TO URINE CULTURE - Abnormal; Notable for the following components:    Glucose, UA >1000 (*)     Blood, UA Small (*)     All other components within normal limits   CBC WITH DIFFERENTIAL - Abnormal; Notable for the following components:    Neutrophils % 51.8 (*)     Monocytes % 6.1 (*)     All other  components within normal limits   URINALYSIS, MICROSCOPIC - Abnormal; Notable for the following components:    RBC, UA 15 (*)     Squamous Epithelial Cells, UA Occasional (*)     Mucous Occasional (*)     All other components within normal limits   POCT GLUCOSE MONITORING CONTINUOUS - Abnormal; Notable for the following components:    POC Glucose 236 (*)     All other components within normal limits   RAPID INFLUENZA A/B - Normal   SARS-COV-2 RNA AMPLIFICATION, QUAL - Normal    Narrative:     Negative SARS-CoV results should not be used as the sole basis for treatment or patient management decisions; negative results should be considered in the context of a patient's recent exposures, history and the presene of clinical signs and symptoms consistent with COVID-19.  Negative results should be treated as presumptive and confirmed by molecular assay, if necessary for patient management.   CBC W/ AUTO DIFFERENTIAL    Narrative:     The following orders were created for panel order CBC auto differential.  Procedure                               Abnormality         Status                     ---------                               -----------         ------                     CBC with Differential[[id number]]       Abnormal            Final result                 Please view results for these tests on the individual orders.          Imaging Results    None          Medications - No data to display  Medical Decision Making  42 year old female presents to ED with complaint of headache, weakness, dizziness, blurred vision, and nausea. Patient states symptoms started today while at work. She was attempting to do something at work and while turning got dizzy and almost fell. She reports she is a type 2 DM and checked her glucose and it was 236 when she checked it. Denies chest pain, shortness of breath, cough, congestion    Labs, diagnostics obtained; prescription provided    Amount and/or Complexity of Data Reviewed  Labs:  ordered.     Details:     Result Value   Glucose 250 (*)   Globulin 4.4 (*)   Alk Phos 148 (*)   All other components within normal limits  URINALYSIS, REFLEX TO URINE CULTURE - Abnormal; Notable for the following components:   Glucose, UA >1000 (*)   Blood, UA Small (*)   All other components within normal limits  CBC WITH DIFFERENTIAL - Abnormal; Notable for the following components:   Neutrophils % 51.8 (*)   Monocytes % 6.1 (*)   All other components within normal limits  URINALYSIS, MICROSCOPIC - Abnormal; Notable for the following components:   RBC, UA 15 (*)   Squamous Epithelial Cells, UA Occasional (*)   Mucous Occasional (*)   All other components within normal limits  POCT GLUCOSE MONITORING CONTINUOUS - Abnormal; Notable for the following components:   POC Glucose 236 (*)    ECG/medicine tests: ordered.     Details: Sinus tachycardia; NSTWA; rate 102                               Clinical Impression:   Final diagnoses:  [R42] Dizziness  [E11.65] Hyperglycemia due to diabetes mellitus (Primary)        ED Disposition Condition    Discharge Stable          ED Prescriptions       Medication Sig Dispense Start Date End Date Auth. Provider    meclizine (ANTIVERT) 25 mg tablet Take 1 tablet (25 mg total) by mouth 3 (three) times daily as needed for Dizziness. 20 tablet 9/29/2023 -- Megan Bunn FNP          Follow-up Information    None          Megan Bunn FNP  10/06/23 [id number]

## 2023-09-29 NOTE — Clinical Note
"Ashly"Cynthia Trent was seen and treated in our emergency department on 9/29/2023.  She may return to work on 10/02/2023.       If you have any questions or concerns, please don't hesitate to call.      Megan Bunn, ROCIOP"

## 2023-10-19 DIAGNOSIS — E11.65 TYPE 2 DIABETES MELLITUS WITH HYPERGLYCEMIA, WITHOUT LONG-TERM CURRENT USE OF INSULIN: ICD-10-CM

## 2023-10-19 RX ORDER — PEN NEEDLE, DIABETIC 32GX 5/32"
NEEDLE, DISPOSABLE MISCELLANEOUS
Qty: 200 EACH | Refills: 3 | Status: SHIPPED | OUTPATIENT
Start: 2023-10-19

## 2023-10-23 ENCOUNTER — TELEPHONE (OUTPATIENT)
Dept: PRIMARY CARE CLINIC | Facility: CLINIC | Age: 43
End: 2023-10-23
Payer: COMMERCIAL

## 2023-10-23 NOTE — TELEPHONE ENCOUNTER
Imer, Noa Hinojosa, Angie REDDY, RN  Good morning, Nurse Ty.     Ms. Kaplan has ordered a Mammo for Ms. Trent. Ms. Trent has the Virtual Access Plan which requires a referral from her primary medical group, [facility]. The patient will need to reach out to [facility] to obtain a referral to see Ms. Kaplan and a referral to the provider that will be performing the Mammogram per Ms. Tawanna PAIZ/Mack. [facility] can be reached at [phone]. Without the referral, these services will not be covered/paid. Thank you.     Noa Willams   Pre-

## 2023-11-14 ENCOUNTER — OFFICE VISIT (OUTPATIENT)
Dept: DIABETES SERVICES | Facility: CLINIC | Age: 43
End: 2023-11-14
Payer: COMMERCIAL

## 2023-11-14 VITALS
DIASTOLIC BLOOD PRESSURE: 78 MMHG | SYSTOLIC BLOOD PRESSURE: 124 MMHG | WEIGHT: 293 LBS | HEIGHT: 66 IN | HEART RATE: 85 BPM | RESPIRATION RATE: 18 BRPM | BODY MASS INDEX: 47.09 KG/M2 | OXYGEN SATURATION: 97 %

## 2023-11-14 DIAGNOSIS — E78.2 MIXED HYPERLIPIDEMIA: ICD-10-CM

## 2023-11-14 DIAGNOSIS — I10 PRIMARY HYPERTENSION: ICD-10-CM

## 2023-11-14 DIAGNOSIS — E11.65 TYPE 2 DIABETES MELLITUS WITH HYPERGLYCEMIA, WITH LONG-TERM CURRENT USE OF INSULIN: Primary | ICD-10-CM

## 2023-11-14 DIAGNOSIS — Z79.4 TYPE 2 DIABETES MELLITUS WITH HYPERGLYCEMIA, WITH LONG-TERM CURRENT USE OF INSULIN: Primary | ICD-10-CM

## 2023-11-14 LAB — GLUCOSE SERPL-MCNC: 340 MG/DL (ref 70–110)

## 2023-11-14 PROCEDURE — 3046F HEMOGLOBIN A1C LEVEL >9.0%: CPT | Mod: CPTII,,, | Performed by: NURSE PRACTITIONER

## 2023-11-14 PROCEDURE — 3008F BODY MASS INDEX DOCD: CPT | Mod: CPTII,,, | Performed by: NURSE PRACTITIONER

## 2023-11-14 PROCEDURE — 4010F PR ACE/ARB THEARPY RXD/TAKEN: ICD-10-PCS | Mod: CPTII,,, | Performed by: NURSE PRACTITIONER

## 2023-11-14 PROCEDURE — 3078F PR MOST RECENT DIASTOLIC BLOOD PRESSURE < 80 MM HG: ICD-10-PCS | Mod: CPTII,,, | Performed by: NURSE PRACTITIONER

## 2023-11-14 PROCEDURE — 3046F PR MOST RECENT HEMOGLOBIN A1C LEVEL > 9.0%: ICD-10-PCS | Mod: CPTII,,, | Performed by: NURSE PRACTITIONER

## 2023-11-14 PROCEDURE — 3074F SYST BP LT 130 MM HG: CPT | Mod: CPTII,,, | Performed by: NURSE PRACTITIONER

## 2023-11-14 PROCEDURE — 3066F NEPHROPATHY DOC TX: CPT | Mod: CPTII,,, | Performed by: NURSE PRACTITIONER

## 2023-11-14 PROCEDURE — 99999PBSHW POCT GLUCOSE, HAND-HELD DEVICE: ICD-10-PCS | Mod: PBBFAC,,,

## 2023-11-14 PROCEDURE — 3066F PR DOCUMENTATION OF TREATMENT FOR NEPHROPATHY: ICD-10-PCS | Mod: CPTII,,, | Performed by: NURSE PRACTITIONER

## 2023-11-14 PROCEDURE — 1159F MED LIST DOCD IN RCRD: CPT | Mod: CPTII,,, | Performed by: NURSE PRACTITIONER

## 2023-11-14 PROCEDURE — 3074F PR MOST RECENT SYSTOLIC BLOOD PRESSURE < 130 MM HG: ICD-10-PCS | Mod: CPTII,,, | Performed by: NURSE PRACTITIONER

## 2023-11-14 PROCEDURE — 3061F PR NEG MICROALBUMINURIA RESULT DOCUMENTED/REVIEW: ICD-10-PCS | Mod: CPTII,,, | Performed by: NURSE PRACTITIONER

## 2023-11-14 PROCEDURE — 99214 OFFICE O/P EST MOD 30 MIN: CPT | Mod: S$PBB,,, | Performed by: NURSE PRACTITIONER

## 2023-11-14 PROCEDURE — 1159F PR MEDICATION LIST DOCUMENTED IN MEDICAL RECORD: ICD-10-PCS | Mod: CPTII,,, | Performed by: NURSE PRACTITIONER

## 2023-11-14 PROCEDURE — 99999PBSHW POCT GLUCOSE, HAND-HELD DEVICE: Mod: PBBFAC,,,

## 2023-11-14 PROCEDURE — 99214 PR OFFICE/OUTPT VISIT, EST, LEVL IV, 30-39 MIN: ICD-10-PCS | Mod: S$PBB,,, | Performed by: NURSE PRACTITIONER

## 2023-11-14 PROCEDURE — 3078F DIAST BP <80 MM HG: CPT | Mod: CPTII,,, | Performed by: NURSE PRACTITIONER

## 2023-11-14 PROCEDURE — 99214 OFFICE O/P EST MOD 30 MIN: CPT | Mod: PBBFAC | Performed by: NURSE PRACTITIONER

## 2023-11-14 PROCEDURE — 4010F ACE/ARB THERAPY RXD/TAKEN: CPT | Mod: CPTII,,, | Performed by: NURSE PRACTITIONER

## 2023-11-14 PROCEDURE — 82962 GLUCOSE BLOOD TEST: CPT | Mod: PBBFAC | Performed by: NURSE PRACTITIONER

## 2023-11-14 PROCEDURE — 3008F PR BODY MASS INDEX (BMI) DOCUMENTED: ICD-10-PCS | Mod: CPTII,,, | Performed by: NURSE PRACTITIONER

## 2023-11-14 PROCEDURE — 3061F NEG MICROALBUMINURIA REV: CPT | Mod: CPTII,,, | Performed by: NURSE PRACTITIONER

## 2023-11-14 RX ORDER — SEMAGLUTIDE 0.68 MG/ML
0.5 INJECTION, SOLUTION SUBCUTANEOUS
Qty: 9 ML | Refills: 1 | Status: SHIPPED | OUTPATIENT
Start: 2023-11-14 | End: 2024-02-21

## 2023-11-14 RX ORDER — INSULIN DETEMIR 100 [IU]/ML
32 INJECTION, SOLUTION SUBCUTANEOUS NIGHTLY
Qty: 45 ML | Refills: 1 | Status: SHIPPED | OUTPATIENT
Start: 2023-11-14 | End: 2024-02-21

## 2023-11-14 RX ORDER — ACETAMINOPHEN AND CODEINE PHOSPHATE 120; 12 MG/5ML; MG/5ML
1 SOLUTION ORAL DAILY
COMMUNITY
Start: 2023-05-05 | End: 2024-02-21 | Stop reason: SDUPTHER

## 2023-11-14 RX ORDER — PEN NEEDLE, DIABETIC 31 GX5/16"
NEEDLE, DISPOSABLE MISCELLANEOUS
COMMUNITY
Start: 2023-10-20

## 2023-11-14 NOTE — PROGRESS NOTES
Subjective:         Patient ID: Ashly Trent is a 43 y.o. female.  Patient's current PCP is Rosaura Manzanares FNP.     Chief Complaint: General Diabetes Follow-up (Pt. States check glucose 3 times daily.)    HPI  Ashly Trent is a 43 y.o. Other female presenting for an established visit for type 2 diabetes.   Received diabetes education:no    At last visit:  We will try to do pt assistance for ozempic.  Provided paperwork and advised her to fill out and bring back with appropriate information asap.  Increase levemir by 2 units every 2 days until am readings are 130s.  Add 2 units to ss    Screening or Prevention Patient's value Goal    HgA1C Testing and Control   Hemoglobin A1C   Date Value Ref Range Status   03/22/2023 10.0 (A) 4.5 - 6.6 % Final   11/30/2022 10.1 (A) 4.5 - 6.6 % Final   08/22/2022 11.3 (A) 4.5 - 6.6 % Final       Annually/Less than 8%    Lipid profile : 02/22/2023 Annually    CMP CMP  Sodium   Date Value Ref Range Status   09/29/2023 137 136 - 145 mmol/L Final     Potassium   Date Value Ref Range Status   09/29/2023 4.1 3.5 - 5.1 mmol/L Final     Chloride   Date Value Ref Range Status   09/29/2023 102 98 - 107 mmol/L Final     CO2   Date Value Ref Range Status   09/29/2023 27 21 - 32 mmol/L Final     Glucose   Date Value Ref Range Status   09/29/2023 250 (H) 74 - 106 mg/dL Final     BUN   Date Value Ref Range Status   09/29/2023 13 7 - 18 mg/dL Final     Creatinine   Date Value Ref Range Status   09/29/2023 0.94 0.55 - 1.02 mg/dL Final     Calcium   Date Value Ref Range Status   09/29/2023 9.0 8.5 - 10.1 mg/dL Final     Total Protein   Date Value Ref Range Status   09/29/2023 7.9 6.4 - 8.2 g/dL Final     Albumin   Date Value Ref Range Status   09/29/2023 3.5 3.5 - 5.0 g/dL Final     Bilirubin, Total   Date Value Ref Range Status   09/29/2023 0.5 >0.0 - 1.2 mg/dL Final     Alk Phos   Date Value Ref Range Status   09/29/2023 148 (H) 37 - 98 U/L Final     AST   Date Value Ref Range Status  "  09/29/2023 19 15 - 37 U/L Final     ALT   Date Value Ref Range Status   09/29/2023 23 13 - 56 U/L Final     Anion Gap   Date Value Ref Range Status   09/29/2023 12 7 - 16 mmol/L Final     eGFR   Date Value Ref Range Status   09/29/2023 79 >=60 mL/min/1.73m2 Final     Annually    Microalbumin  Lab Results   Component Value Date    MICROALBUR 0.7 02/22/2023     Annually     LDL control Lab Results   Component Value Date    LDLCALC 67 02/22/2023    Annually/Less than 100 mg/dl     Nephropathy screening No results found for: "MICALBCREAT"  Lab Results   Component Value Date    PROTEINUA Negative 09/29/2023    Annually    Blood pressure BP Readings from Last 1 Encounters:   11/14/23 124/78    Less than 140/90    Dilated retinal exam Most Recent Eye Exam Date: Not Found Annually    Foot exam   : 03/22/2023 Annually              Past failed treatment include:     Blood glucose testing is performed regularly. Patient is testing 3 times per day.  Meter:       Any episodes of hypoglycemia? denies    Complications related to diabetes: none    Her blood sugar in the clinic today was:   Lab Results   Component Value Date    POCGLU 340 (A) 11/14/2023           STANDARDS OF CARE  Ace/Arb:losartan  Statin:lipitor        Review of Systems   Constitutional:  Negative for activity change, appetite change, diaphoresis and fatigue.   HENT:  Negative for nasal congestion, facial swelling and sinus pressure/congestion.    Eyes:  Negative for visual disturbance.   Respiratory:  Negative for shortness of breath and wheezing.    Cardiovascular:  Negative for chest pain and leg swelling.   Gastrointestinal:  Negative for constipation, diarrhea, nausea and vomiting.   Endocrine: Negative for polydipsia, polyphagia and polyuria.   Genitourinary:  Negative for dysuria, frequency and urgency.   Musculoskeletal:  Negative for gait problem and myalgias.   Integumentary:  Negative for color change, rash and wound.   Neurological:  Negative for " dizziness, syncope, weakness, headaches and coordination difficulties.   Hematological:  Does not bruise/bleed easily.   Psychiatric/Behavioral:  Negative for self-injury, sleep disturbance and suicidal ideas. The patient is not nervous/anxious.         Objective:      Vitals:    11/14/23 0817   BP: 124/78   Pulse: 85   Resp: 18     Physical Exam  Vitals and nursing note reviewed.   Constitutional:       Appearance: Normal appearance.   HENT:      Head: Normocephalic.   Cardiovascular:      Rate and Rhythm: Normal rate and regular rhythm.      Heart sounds: Normal heart sounds.   Pulmonary:      Effort: Pulmonary effort is normal.      Breath sounds: Normal breath sounds.   Musculoskeletal:         General: Normal range of motion.      Right lower leg: No edema.      Left lower leg: No edema.   Skin:     General: Skin is warm and dry.   Neurological:      General: No focal deficit present.      Mental Status: She is alert and oriented to person, place, and time.   Psychiatric:         Mood and Affect: Mood normal.         Behavior: Behavior normal.         Thought Content: Thought content normal.         Judgment: Judgment normal.        Assessment/Plan   1. Type 2 diabetes mellitus with hyperglycemia, with long-term current use of insulin  Be sure to restart levemir, take novolog and farxiga  Be sure to restart ozempic.  Let us know if you are unable to  meds.   Lifestyle modifications  Be sure to have mammogram and eye exam   -     POCT Glucose, Hand-Held Device    2. Mixed hyperlipidemia  Statin coverage    3. Primary hypertension  ARB coverage   Overview:  Formatting of this note might be different from the original.  LOSARTAN 25MG DAILY            - Follow up: 3 months      I spent a total of 20 minutes on the day of the visit.This includes face to face time and non-face to face time preparing to see the patient (eg, review of tests), documenting clinical information in the electronic record,  independently interpreting results and communicating results to the patient.    Lalitha Hahn FNP-BC ADM-BC  [facility]

## 2024-01-18 ENCOUNTER — HOSPITAL ENCOUNTER (OUTPATIENT)
Dept: RADIOLOGY | Facility: HOSPITAL | Age: 44
Discharge: HOME | End: 2024-01-18
Payer: COMMERCIAL

## 2024-01-18 VITALS — HEIGHT: 66 IN | WEIGHT: 293 LBS | BODY MASS INDEX: 47.09 KG/M2

## 2024-01-18 DIAGNOSIS — Z12.31 SCREENING MAMMOGRAM FOR BREAST CANCER: ICD-10-CM

## 2024-01-18 PROCEDURE — 77067 SCR MAMMO BI INCL CAD: CPT | Mod: TC

## 2024-02-21 ENCOUNTER — OFFICE VISIT (OUTPATIENT)
Dept: PRIMARY CARE CLINIC | Facility: CLINIC | Age: 44
End: 2024-02-21
Payer: COMMERCIAL

## 2024-02-21 VITALS
BODY MASS INDEX: 47.09 KG/M2 | HEIGHT: 66 IN | SYSTOLIC BLOOD PRESSURE: 134 MMHG | OXYGEN SATURATION: 99 % | TEMPERATURE: 98 F | WEIGHT: 293 LBS | HEART RATE: 96 BPM | DIASTOLIC BLOOD PRESSURE: 82 MMHG

## 2024-02-21 DIAGNOSIS — E11.49 OTHER DIABETIC NEUROLOGICAL COMPLICATION ASSOCIATED WITH TYPE 2 DIABETES MELLITUS: ICD-10-CM

## 2024-02-21 DIAGNOSIS — E11.65 TYPE 2 DIABETES MELLITUS WITH HYPERGLYCEMIA, WITHOUT LONG-TERM CURRENT USE OF INSULIN: ICD-10-CM

## 2024-02-21 DIAGNOSIS — I10 ESSENTIAL HYPERTENSION, BENIGN: ICD-10-CM

## 2024-02-21 DIAGNOSIS — Z00.00 ROUTINE GENERAL MEDICAL EXAMINATION AT A HEALTH CARE FACILITY: Primary | ICD-10-CM

## 2024-02-21 PROCEDURE — 3075F SYST BP GE 130 - 139MM HG: CPT | Mod: CPTII,,, | Performed by: NURSE PRACTITIONER

## 2024-02-21 PROCEDURE — 1160F RVW MEDS BY RX/DR IN RCRD: CPT | Mod: CPTII,,, | Performed by: NURSE PRACTITIONER

## 2024-02-21 PROCEDURE — 1159F MED LIST DOCD IN RCRD: CPT | Mod: CPTII,,, | Performed by: NURSE PRACTITIONER

## 2024-02-21 PROCEDURE — 3008F BODY MASS INDEX DOCD: CPT | Mod: CPTII,,, | Performed by: NURSE PRACTITIONER

## 2024-02-21 PROCEDURE — 99396 PREV VISIT EST AGE 40-64: CPT | Mod: ,,, | Performed by: NURSE PRACTITIONER

## 2024-02-21 PROCEDURE — 3079F DIAST BP 80-89 MM HG: CPT | Mod: CPTII,,, | Performed by: NURSE PRACTITIONER

## 2024-02-21 RX ORDER — AMLODIPINE BESYLATE 5 MG/1
5 TABLET ORAL DAILY
Qty: 90 TABLET | Refills: 3 | Status: SHIPPED | OUTPATIENT
Start: 2024-02-21

## 2024-02-21 RX ORDER — ATORVASTATIN CALCIUM 20 MG/1
20 TABLET, FILM COATED ORAL DAILY
Qty: 90 TABLET | Refills: 3 | Status: SHIPPED | OUTPATIENT
Start: 2024-02-21 | End: 2025-02-20

## 2024-02-21 RX ORDER — LOSARTAN POTASSIUM 100 MG/1
100 TABLET ORAL DAILY
Qty: 90 TABLET | Refills: 3 | Status: SHIPPED | OUTPATIENT
Start: 2024-02-21 | End: 2025-02-20

## 2024-02-21 RX ORDER — GABAPENTIN 100 MG/1
100 CAPSULE ORAL DAILY
Qty: 90 CAPSULE | Refills: 3 | Status: SHIPPED | OUTPATIENT
Start: 2024-02-21

## 2024-02-21 NOTE — PROGRESS NOTES
Subjective     Patient ID: Aslhy Trent is a 43 y.o. female.    Chief Complaint: Annual Exam    Pt presents for a wellness visit. Pt states she stopped taking all insulin and diabetic meds.      Review of Systems   Constitutional:  Negative for activity change, appetite change, chills, fatigue and fever.   HENT:  Negative for nasal congestion, ear discharge, nosebleeds, postnasal drip, rhinorrhea, sinus pressure/congestion, sneezing, sore throat and tinnitus.    Eyes:  Negative for pain, discharge, redness and itching.   Respiratory:  Negative for cough, choking, chest tightness, shortness of breath and wheezing.    Cardiovascular:  Negative for chest pain.   Gastrointestinal:  Negative for abdominal distention, abdominal pain, blood in stool, change in bowel habit, constipation, diarrhea, nausea and vomiting.   Genitourinary:  Negative for decreased urine volume, dysuria, flank pain, frequency, menstrual irregularity and menstrual problem.   Musculoskeletal:  Negative for back pain and gait problem.        Numbness and tingling to right foot   Integumentary:  Negative for wound, breast mass and breast discharge.   Allergic/Immunologic: Negative for immunocompromised state.   Neurological:  Negative for dizziness, light-headedness and headaches.   Psychiatric/Behavioral:  Negative for agitation, behavioral problems and hallucinations.    Breast: Negative for mass         Objective     Physical Exam  Vitals and nursing note reviewed.   Constitutional:       Appearance: Normal appearance.   Cardiovascular:      Rate and Rhythm: Normal rate and regular rhythm.      Heart sounds: Normal heart sounds.   Pulmonary:      Effort: Pulmonary effort is normal.      Breath sounds: Normal breath sounds.   Musculoskeletal:         General: Normal range of motion.   Neurological:      Mental Status: She is alert and oriented to person, place, and time.   Psychiatric:         Mood and Affect: Mood normal.         Behavior:  Behavior normal.            Assessment and Plan     1. Routine general medical examination at a health care facility    2. Type 2 diabetes mellitus with hyperglycemia, without long-term current use of insulin  -     Hemoglobin A1C; Future; Expected date: 02/21/2024  -     CBC Auto Differential; Future; Expected date: 02/21/2024  -     Comprehensive Metabolic Panel; Future; Expected date: 02/21/2024  -     Lipid Panel; Future; Expected date: 02/21/2024  -     TSH; Future; Expected date: 02/21/2024  -     Microalbumin/Creatinine Ratio, Urine; Future; Expected date: 02/21/2024  -     atorvastatin (LIPITOR) 20 MG tablet; Take 1 tablet (20 mg total) by mouth once daily.  Dispense: 90 tablet; Refill: 3    3. BMI 45.0-49.9, adult    4. Essential hypertension, benign  -     losartan (COZAAR) 100 MG tablet; Take 1 tablet (100 mg total) by mouth once daily.  Dispense: 90 tablet; Refill: 3  -     amLODIPine (NORVASC) 5 MG tablet; Take 1 tablet (5 mg total) by mouth once daily.  Dispense: 90 tablet; Refill: 3    5. Other diabetic neurological complication associated with type 2 diabetes mellitus  -     gabapentin (NEURONTIN) 100 MG capsule; Take 1 capsule (100 mg total) by mouth once daily.  Dispense: 90 capsule; Refill: 3        Will call pt with lab results. Pt states she will make an eye appointment and have the visit sent here. Keep appointment with Lalitha Hahn NP next month for diabetes follow-up.          Follow up in about 6 months (around 8/21/2024).

## 2024-03-15 DIAGNOSIS — F32.A DEPRESSION, UNSPECIFIED DEPRESSION TYPE: ICD-10-CM

## 2024-03-16 RX ORDER — BUSPIRONE HYDROCHLORIDE 10 MG/1
TABLET ORAL
Qty: 270 TABLET | Refills: 3 | Status: SHIPPED | OUTPATIENT
Start: 2024-03-16

## 2024-04-02 ENCOUNTER — OFFICE VISIT (OUTPATIENT)
Dept: FAMILY MEDICINE | Facility: CLINIC | Age: 44
End: 2024-04-02
Payer: COMMERCIAL

## 2024-04-02 VITALS
HEIGHT: 66 IN | BODY MASS INDEX: 47.09 KG/M2 | WEIGHT: 293 LBS | OXYGEN SATURATION: 96 % | HEART RATE: 99 BPM | DIASTOLIC BLOOD PRESSURE: 87 MMHG | SYSTOLIC BLOOD PRESSURE: 137 MMHG | TEMPERATURE: 98 F

## 2024-04-02 DIAGNOSIS — R21 RASH: Primary | ICD-10-CM

## 2024-04-02 PROCEDURE — 3060F POS MICROALBUMINURIA REV: CPT | Mod: CPTII,,, | Performed by: FAMILY MEDICINE

## 2024-04-02 PROCEDURE — 3066F NEPHROPATHY DOC TX: CPT | Mod: CPTII,,, | Performed by: FAMILY MEDICINE

## 2024-04-02 PROCEDURE — 3075F SYST BP GE 130 - 139MM HG: CPT | Mod: CPTII,,, | Performed by: FAMILY MEDICINE

## 2024-04-02 PROCEDURE — 3046F HEMOGLOBIN A1C LEVEL >9.0%: CPT | Mod: CPTII,,, | Performed by: FAMILY MEDICINE

## 2024-04-02 PROCEDURE — 3079F DIAST BP 80-89 MM HG: CPT | Mod: CPTII,,, | Performed by: FAMILY MEDICINE

## 2024-04-02 PROCEDURE — 99213 OFFICE O/P EST LOW 20 MIN: CPT | Mod: ,,, | Performed by: FAMILY MEDICINE

## 2024-04-02 PROCEDURE — 99051 MED SERV EVE/WKEND/HOLIDAY: CPT | Mod: ,,, | Performed by: FAMILY MEDICINE

## 2024-04-02 PROCEDURE — 3008F BODY MASS INDEX DOCD: CPT | Mod: CPTII,,, | Performed by: FAMILY MEDICINE

## 2024-04-02 PROCEDURE — 4010F ACE/ARB THERAPY RXD/TAKEN: CPT | Mod: CPTII,,, | Performed by: FAMILY MEDICINE

## 2024-04-02 PROCEDURE — 1159F MED LIST DOCD IN RCRD: CPT | Mod: CPTII,,, | Performed by: FAMILY MEDICINE

## 2024-04-02 RX ORDER — TRIAMCINOLONE ACETONIDE 1 MG/G
CREAM TOPICAL 2 TIMES DAILY
Qty: 453.6 G | Refills: 5 | Status: SHIPPED | OUTPATIENT
Start: 2024-04-02

## 2024-04-02 NOTE — PROGRESS NOTES
Subjective     Patient ID: Ashly Trent is a 43 y.o. female.    Chief Complaint: Rash (Pt complains of rash on face and arms x 2 weeks.)    Patient said the lesions began a small bumps.  Now getting larger.  No fever.    Rash      Review of Systems   Integumentary:  Positive for rash.          Objective     Physical Exam  Constitutional:       General: She is not in acute distress.     Appearance: She is obese. She is not ill-appearing.   Cardiovascular:      Rate and Rhythm: Normal rate and regular rhythm.   Pulmonary:      Effort: Pulmonary effort is normal.      Breath sounds: Normal breath sounds.   Skin:     Findings: Rash (rash on neck is primarily hyperpigmented scaling lesions.  She has some unusual discrete lesions on both forearms.  Slightly raised annular lesions.) present.   Neurological:      Mental Status: She is alert.            Assessment and Plan     1. Rash  -     Ambulatory referral/consult to Dermatology; Future; Expected date: 04/09/2024    Other orders  -     triamcinolone acetonide 0.1% (KENALOG) 0.1 % cream; Apply topically 2 (two) times daily. Do not use on face  Dispense: 453.6 g; Refill: 5        Rash probably due to eczema.  Treat with triamcinolone.  Refer to Dermatology.  If the rash clears with treatment she will cancel the appointment         No follow-ups on file.

## 2024-06-16 ENCOUNTER — HOSPITAL ENCOUNTER (EMERGENCY)
Facility: HOSPITAL | Age: 44
Discharge: HOME | End: 2024-06-16
Payer: COMMERCIAL

## 2024-06-16 VITALS
OXYGEN SATURATION: 97 % | BODY MASS INDEX: 47.09 KG/M2 | RESPIRATION RATE: 18 BRPM | HEIGHT: 66 IN | TEMPERATURE: 99 F | WEIGHT: 293 LBS | SYSTOLIC BLOOD PRESSURE: 114 MMHG | HEART RATE: 90 BPM | DIASTOLIC BLOOD PRESSURE: 67 MMHG

## 2024-06-16 DIAGNOSIS — N39.0 URINARY TRACT INFECTION WITH HEMATURIA, SITE UNSPECIFIED: Primary | ICD-10-CM

## 2024-06-16 DIAGNOSIS — R31.9 URINARY TRACT INFECTION WITH HEMATURIA, SITE UNSPECIFIED: Primary | ICD-10-CM

## 2024-06-16 LAB
B-HCG UR QL: NEGATIVE
BACTERIA #/AREA URNS HPF: ABNORMAL /HPF
BILIRUB UR QL STRIP: NEGATIVE
CLARITY UR: CLEAR
COLOR UR: COLORLESS
CTP QC/QA: YES
GLUCOSE UR STRIP-MCNC: >1000 MG/DL
KETONES UR STRIP-SCNC: NEGATIVE MG/DL
LEUKOCYTE ESTERASE UR QL STRIP: ABNORMAL
MUCOUS, UA: ABNORMAL /LPF
NITRITE UR QL STRIP: NEGATIVE
PH UR STRIP: 5.5 PH UNITS
PROT UR QL STRIP: NEGATIVE
RBC # UR STRIP: NEGATIVE /UL
RBC #/AREA URNS HPF: 12 /HPF
SP GR UR STRIP: 1.04
SQUAMOUS #/AREA URNS LPF: ABNORMAL /HPF
UROBILINOGEN UR STRIP-ACNC: NORMAL MG/DL
WBC #/AREA URNS HPF: 19 /HPF
YEAST #/AREA URNS HPF: ABNORMAL /HPF

## 2024-06-16 PROCEDURE — 87086 URINE CULTURE/COLONY COUNT: CPT

## 2024-06-16 PROCEDURE — 81025 URINE PREGNANCY TEST: CPT

## 2024-06-16 PROCEDURE — 99283 EMERGENCY DEPT VISIT LOW MDM: CPT

## 2024-06-16 PROCEDURE — 81003 URINALYSIS AUTO W/O SCOPE: CPT

## 2024-06-16 RX ORDER — NITROFURANTOIN 25; 75 MG/1; MG/1
100 CAPSULE ORAL 2 TIMES DAILY
Qty: 20 CAPSULE | Refills: 0 | Status: SHIPPED | OUTPATIENT
Start: 2024-06-16 | End: 2024-06-27

## 2024-06-16 NOTE — Clinical Note
"Ashly Carpenterica"Twan was seen and treated in our emergency department on 6/16/2024.  She may return to work on 06/18/2024.       If you have any questions or concerns, please don't hesitate to call.      KEYON Martinez RN    "

## 2024-06-17 NOTE — ED PROVIDER NOTES
Encounter Date: 6/16/2024       History     Chief Complaint   Patient presents with    Back Pain     43-year-old female presents to the emergency department for evaluation of left flank pain that radiates to right and left lower quadrants.  Patient reports that the pain has been going on for the past few days with no relief from Tylenol.  Denies dysuria, abnormal vaginal discharge/odor, fever, chills, nausea, vomiting, chest pain, shortness of breath.    The history is provided by the patient. No  was used.   Abdominal Cramping  The primary symptoms of the illness include abdominal pain (Right and left lower quadrants). The primary symptoms of the illness do not include fever, fatigue, shortness of breath, nausea, vomiting, diarrhea, hematemesis, dysuria or vaginal discharge.   The abdominal pain is located in the RLQ and LLQ.   The patient states that she believes she is currently not pregnant. Symptoms associated with the illness do not include chills, constipation or back pain.   Abdominal Pain  The other symptoms of the illness do not include fever, fatigue, shortness of breath, nausea, vomiting, diarrhea, dysuria, hematemesis or vaginal discharge.   Symptoms associated with the illness do not include chills, constipation or back pain.     Review of patient's allergies indicates:   Allergen Reactions    Amoxicillin Rash     Past Medical History:   Diagnosis Date    Acid reflux     Diabetes mellitus, type 2     HTN (hypertension)     Hyperlipidemia     Migraine with aura     Obesity      Past Surgical History:   Procedure Laterality Date    CHOLECYSTECTOMY      TONSILLECTOMY       Family History   Problem Relation Name Age of Onset    Asthma Mother      Diabetes Mother      Hypertension Mother      Hypertension Father      Diabetes Paternal Grandmother      Heart disease Paternal Grandfather      Hypertension Sister      No Known Problems Brother      No Known Problems Brother       Social  History     Tobacco Use    Smoking status: Never    Smokeless tobacco: Never   Substance Use Topics    Alcohol use: Yes    Drug use: Not Currently     Review of Systems   Constitutional:  Negative for chills, fatigue and fever.   Eyes:  Negative for photophobia, pain and visual disturbance.   Respiratory:  Negative for shortness of breath.    Gastrointestinal:  Positive for abdominal pain (Right and left lower quadrants). Negative for constipation, diarrhea, hematemesis, nausea and vomiting.   Genitourinary:  Positive for flank pain (Left flank pain). Negative for dysuria and vaginal discharge.   Musculoskeletal:  Negative for back pain.   Neurological:  Negative for dizziness, tremors and weakness.   Psychiatric/Behavioral:  Negative for confusion.    All other systems reviewed and are negative.      Physical Exam     Initial Vitals [06/16/24 2156]   BP Pulse Resp Temp SpO2   114/67 90 18 99 °F (37.2 °C) 97 %      MAP       --         Physical Exam    Vitals reviewed.  Constitutional: She appears well-nourished. No distress.   HENT:   Head: Normocephalic.   Eyes: Right eye exhibits no discharge. Left eye exhibits no discharge.   Neck: Neck supple.   Normal range of motion.  Cardiovascular:  Normal rate and regular rhythm.           Pulmonary/Chest: Breath sounds normal. No respiratory distress. She has no wheezes.   Abdominal: Abdomen is soft and protuberant. Bowel sounds are normal. She exhibits no distension. There is no abdominal tenderness.   No right CVA tenderness.  No left CVA tenderness. There is no rebound and no guarding.   Musculoskeletal:         General: No tenderness or edema. Normal range of motion.      Cervical back: Normal range of motion and neck supple.     Lymphadenopathy:     She has no cervical adenopathy.   Neurological: She is alert and oriented to person, place, and time. GCS score is 15. GCS eye subscore is 4. GCS verbal subscore is 5. GCS motor subscore is 6.   Skin: Skin is warm and  dry. Capillary refill takes less than 2 seconds.   Psychiatric: She has a normal mood and affect. Her behavior is normal.         Medical Screening Exam   See Full Note    ED Course   Procedures  Labs Reviewed   URINALYSIS, REFLEX TO URINE CULTURE - Abnormal; Notable for the following components:       Result Value    Leukocytes, UA Moderate (*)     Glucose, UA >1000 (*)     Specific Gravity, UA 1.037 (*)     All other components within normal limits   URINALYSIS, MICROSCOPIC - Abnormal; Notable for the following components:    WBC, UA 19 (*)     RBC, UA 12 (*)     Bacteria, UA Occasional (*)     Squamous Epithelial Cells, UA Occasional (*)     Yeast, UA Occasional (*)     Mucous Occasional (*)     All other components within normal limits   CULTURE, URINE   POCT URINE PREGNANCY          Imaging Results    None          Medications - No data to display  Medical Decision Making  43-year-old female presents to the emergency department for evaluation of left flank pain that radiates to right and left lower quadrants.  Patient reports that the pain has been going on for the past few days with no relief from Tylenol.  Denies dysuria, abnormal vaginal discharge/odor, fever, chills, nausea, vomiting, chest pain, shortness of breath.  Ordered and reviewed labs with the results significant for urinary tract infection   Prescribed Macrobid for discharge  Diagnosis: Urinary tract infection    Amount and/or Complexity of Data Reviewed  Labs: ordered.    Risk  Prescription drug management.                                      Clinical Impression:   Final diagnoses:  [N39.0, R31.9] Urinary tract infection with hematuria, site unspecified (Primary)        ED Disposition Condition    Discharge Stable          ED Prescriptions       Medication Sig Dispense Start Date End Date Auth. Provider    nitrofurantoin, macrocrystal-monohydrate, (MACROBID) 100 MG capsule Take 1 capsule (100 mg total) by mouth 2 (two) times daily. for 10 days  20 capsule 6/16/2024 6/26/2024 Pavan Hernández, TRENT          Follow-up Information    None          Pavan Hernández, TRENT  06/16/24 9971

## 2024-06-17 NOTE — DISCHARGE INSTRUCTIONS
Take medication as ordered, take with food and drink plenty of water.  Follow up with primary care provider in 2 days.  Return to the emergency department for new or worsening symptoms

## 2024-06-18 ENCOUNTER — HOSPITAL ENCOUNTER (EMERGENCY)
Facility: HOSPITAL | Age: 44
Discharge: HOME | End: 2024-06-18
Attending: FAMILY MEDICINE
Payer: COMMERCIAL

## 2024-06-18 VITALS
WEIGHT: 293 LBS | SYSTOLIC BLOOD PRESSURE: 131 MMHG | OXYGEN SATURATION: 93 % | BODY MASS INDEX: 47.09 KG/M2 | DIASTOLIC BLOOD PRESSURE: 85 MMHG | RESPIRATION RATE: 18 BRPM | TEMPERATURE: 98 F | HEIGHT: 66 IN | HEART RATE: 90 BPM

## 2024-06-18 DIAGNOSIS — N39.0 URINARY TRACT INFECTION WITHOUT HEMATURIA, SITE UNSPECIFIED: Primary | ICD-10-CM

## 2024-06-18 LAB — GLUCOSE SERPL-MCNC: 264 MG/DL (ref 70–105)

## 2024-06-18 PROCEDURE — 96372 THER/PROPH/DIAG INJ SC/IM: CPT | Performed by: FAMILY MEDICINE

## 2024-06-18 PROCEDURE — 63600175 PHARM REV CODE 636 W HCPCS: Mod: JZ,JG | Performed by: FAMILY MEDICINE

## 2024-06-18 PROCEDURE — 82962 GLUCOSE BLOOD TEST: CPT

## 2024-06-18 PROCEDURE — 99284 EMERGENCY DEPT VISIT MOD MDM: CPT | Mod: 25

## 2024-06-18 RX ORDER — CLINDAMYCIN PHOSPHATE 150 MG/ML
600 INJECTION, SOLUTION INTRAVENOUS
Status: COMPLETED | OUTPATIENT
Start: 2024-06-18 | End: 2024-06-18

## 2024-06-18 RX ORDER — AZITHROMYCIN 200 MG/5ML
500 POWDER, FOR SUSPENSION ORAL DAILY
Qty: 90 ML | Refills: 0 | Status: SHIPPED | OUTPATIENT
Start: 2024-06-18 | End: 2024-06-26

## 2024-06-18 RX ORDER — SULFAMETHOXAZOLE AND TRIMETHOPRIM 200; 40 MG/5ML; MG/5ML
8 SUSPENSION ORAL EVERY 12 HOURS
Qty: 737 ML | Refills: 0 | Status: SHIPPED | OUTPATIENT
Start: 2024-06-18 | End: 2024-06-23

## 2024-06-18 RX ADMIN — CLINDAMYCIN PHOSPHATE 600 MG: 150 INJECTION, SOLUTION INTRAMUSCULAR; INTRAVENOUS at 07:06

## 2024-06-18 NOTE — ED PROVIDER NOTES
Encounter Date: 6/18/2024       History     Chief Complaint   Patient presents with    Abdominal Pain     Patient in with abdominal pain.  Unable to take her medications for urinary tract infection because the pills are too big  --and she can not swallow pills        Review of patient's allergies indicates:   Allergen Reactions    Amoxicillin Rash     Past Medical History:   Diagnosis Date    Acid reflux     Diabetes mellitus, type 2     HTN (hypertension)     Hyperlipidemia     Migraine with aura     Obesity      Past Surgical History:   Procedure Laterality Date    CHOLECYSTECTOMY      TONSILLECTOMY       Family History   Problem Relation Name Age of Onset    Asthma Mother      Diabetes Mother      Hypertension Mother      Hypertension Father      Diabetes Paternal Grandmother      Heart disease Paternal Grandfather      Hypertension Sister      No Known Problems Brother      No Known Problems Brother       Social History     Tobacco Use    Smoking status: Never    Smokeless tobacco: Never   Substance Use Topics    Alcohol use: Yes    Drug use: Not Currently     Review of Systems   Constitutional: Negative.  Negative for fever.   HENT: Negative.  Negative for sore throat.    Eyes: Negative.    Respiratory: Negative.  Negative for shortness of breath.    Cardiovascular: Negative.  Negative for chest pain.   Gastrointestinal: Negative.  Negative for nausea.   Endocrine: Negative.    Genitourinary: Negative.  Negative for dysuria.   Musculoskeletal: Negative.  Negative for back pain.   Skin: Negative.  Negative for rash.   Allergic/Immunologic: Negative.    Neurological: Negative.  Negative for weakness.   Hematological: Negative.  Does not bruise/bleed easily.   Psychiatric/Behavioral: Negative.     All other systems reviewed and are negative.      Physical Exam     Initial Vitals [06/18/24 0714]   BP Pulse Resp Temp SpO2   131/85 90 18 98.3 °F (36.8 °C) (!) 93 %      MAP       --         Physical  Exam    Constitutional: She appears well-developed and well-nourished.   HENT:   Head: Normocephalic and atraumatic.   Right Ear: External ear normal.   Left Ear: External ear normal.   Nose: Nose normal.   Mouth/Throat: Oropharynx is clear and moist.   Eyes: Conjunctivae and EOM are normal. Pupils are equal, round, and reactive to light.   Neck: Neck supple.   Normal range of motion.  Cardiovascular:  Normal rate, regular rhythm, normal heart sounds and intact distal pulses.           Pulmonary/Chest: Breath sounds normal.   Abdominal: Abdomen is soft. Bowel sounds are normal.   Genitourinary:    Vagina and uterus normal.     Musculoskeletal:         General: Normal range of motion.      Cervical back: Normal range of motion and neck supple.     Neurological: She is alert and oriented to person, place, and time. She has normal strength and normal reflexes.   Skin: Skin is warm. Capillary refill takes less than 2 seconds.   Psychiatric: She has a normal mood and affect. Her behavior is normal. Judgment and thought content normal.         Medical Screening Exam   See Full Note    ED Course   Procedures  Labs Reviewed   POCT GLUCOSE MONITORING CONTINUOUS - Abnormal; Notable for the following components:       Result Value    POC Glucose 264 (*)     All other components within normal limits          Imaging Results    None          Medications   clindamycin injection 600 mg (600 mg Intramuscular Given 6/18/24 0739)     Medical Decision Making  Risk  Prescription drug management.                          Medical Decision Making:   Initial Assessment:   Patient with diabetes and having lower abdominal pain diagnosed with urinary traction infection but has not been able to take antibiotics because she is unable to swallow the pill  Differential Diagnosis:   1. Urinary tract infection 2.  Cleocin    ED Management:  Azithromycin prescribed.             Clinical Impression:   Final diagnoses:  [N39.0] Urinary tract infection  without hematuria, site unspecified (Primary)        ED Disposition Condition    Discharge Stable          ED Prescriptions       Medication Sig Dispense Start Date End Date Auth. Provider    sulfamethoxazole-trimethoprim 200-40 mg/5 ml (BACTRIM,SEPTRA) 200-40 mg/5 mL Susp Take 73.7 mLs by mouth every 12 (twelve) hours. for 5 days 737 mL 6/18/2024 6/23/2024 Pramod Stovall,     azithromycin 200 mg/5 ml (ZITHROMAX) 200 mg/5 mL suspension Take 36.9 mLs (1,476 mg total) by mouth once daily. for 7 days 12.5 mL 6/18/2024 6/25/2024 Pramod Stovall DO          Follow-up Information    None          Pramod Stovall,   06/18/24 0755

## 2024-06-18 NOTE — ED NOTES
Navin in the pharmacy called clarifying the medications prescribed to patient.  states to d/c the Bactrim and give Azithromycin.

## 2024-06-18 NOTE — Clinical Note
"Ashly "Ashly" Twan was seen and treated in our emergency department on 6/18/2024.  She may return to work on 06/20/2024.       If you have any questions or concerns, please don't hesitate to call.      Pramod Stovall, DO"

## 2024-06-18 NOTE — ED TRIAGE NOTES
Presents to ED per [facility] for complaints of abdominal pain that started this morning.  Patient states she is taking antibioticfs for UTI.

## 2024-06-19 ENCOUNTER — TELEPHONE (OUTPATIENT)
Dept: EMERGENCY MEDICINE | Facility: HOSPITAL | Age: 44
End: 2024-06-19
Payer: COMMERCIAL

## 2024-06-19 LAB — UA COMPLETE W REFLEX CULTURE PNL UR: ABNORMAL

## 2024-06-19 RX ORDER — DOXYCYCLINE 100 MG/1
100 CAPSULE ORAL 2 TIMES DAILY
Qty: 14 CAPSULE | Refills: 0 | Status: SHIPPED | OUTPATIENT
Start: 2024-06-19 | End: 2024-06-26

## 2024-06-19 NOTE — TELEPHONE ENCOUNTER
----- Message from JESI Beltran sent at 6/19/2024 10:39 AM CDT -----  Please notify the patient that her culture came back and we need to change her to doxycycline.  She does not need to take either of the antibiotics she has been previously prescribed over the last 2 visits.

## 2024-06-25 ENCOUNTER — OFFICE VISIT (OUTPATIENT)
Dept: DERMATOLOGY | Facility: CLINIC | Age: 44
End: 2024-06-25
Payer: COMMERCIAL

## 2024-06-25 DIAGNOSIS — L29.9 PRURITUS: ICD-10-CM

## 2024-06-25 DIAGNOSIS — L30.9 DERMATITIS, UNSPECIFIED: Primary | ICD-10-CM

## 2024-06-25 PROCEDURE — 88312 SPECIAL STAINS GROUP 1: CPT | Mod: 26,,, | Performed by: PATHOLOGY

## 2024-06-25 PROCEDURE — 88305 TISSUE EXAM BY PATHOLOGIST: CPT | Mod: 26,,, | Performed by: PATHOLOGY

## 2024-06-25 PROCEDURE — 88305 TISSUE EXAM BY PATHOLOGIST: CPT | Mod: TC,SUR | Performed by: STUDENT IN AN ORGANIZED HEALTH CARE EDUCATION/TRAINING PROGRAM

## 2024-06-25 NOTE — PROGRESS NOTES
[facility]  Norbert Sy MD    [address] [address]  [phone]    Fax: [phone]    Patient Name: Ashly Trent  Medical Record Number: [id number]  PCP: Rosaura Manzanares FNP  Age: 43 y.o. : 1980  Contact: There are no phone numbers on file.    CC: rash on bilateral arms and hands  History of Present Illness:     Ashly Trent is a 43 y.o.  female who presents to clinic today for rash on bilateral arms and hands. It has been present 2 years. Symptoms include pruritus and erythema. Previous treatments include. Previous treatments include TAC 0.1% cream.     The patient has no other concerns today.    Review of Systems:     Unremarkable other than mentioned above.     Physical Exam:     General: Relaxed, oriented, alert    Skin examination of the scalp, face, neck, chest, back, abdomen, upper extremities and lower extremities were normal except for as listed below      Assessment and Plan:     1. Dermatitis Unspecified  -  annular plaques on bilateral forearms  DDx:  nummular eczema vs ACD vs Sarcoid    Plan: Counseling  I counseled the patient regarding the following:  Skin care: Patient instructed to use gentle skin care including dove unscented soap, CeraVe moisturizing cream, and fragrance free laundry detergent.  Expectations: The patient understands that there is not a definitive diagnosis at this time. Further testing or empiric therapy may be necessary to diagnose and improve the condition.  Contact office if: The patient develops a fever, or rash dramatically worsens despite treatment.    Punch Biopsy:     Pre-procedure Diagnosis: same as above  Post_procedure Diagnosis: same   Estimated Blood Loss: 1cc      Complications: None   Specimens: 1      Written informed consent was obtained after discussing risks including pain, bleeding, infection, recurrence and scarring. The biopsy site was sterilely prepped with alcohol, which was  allowed to dry completely, then locally infiltrated with 1% lidocaine with epinephrine, 3 cc total. A punch biopsy was obtained using a 4 mm size punch and the specimen was sent to dermatopathology. 4-0 prolene suture was placed for hemostasis. Petrolatum ointment and a clean dressing were applied. The patient tolerated the procedure well without complications. Verbal and written wound care instructions were given. Sutures should be removed in 7 days.     2. Pruritus   - TAC ointment bid PRN           Norbert Sy MD   Mohs Surgery/Dermatologic Oncology  Dermatology

## 2024-06-27 LAB
DHEA SERPL-MCNC: NORMAL
ESTROGEN SERPL-MCNC: NORMAL PG/ML
INSULIN SERPL-ACNC: NORMAL U[IU]/ML
LAB AP GROSS DESCRIPTION: NORMAL
LAB AP LABORATORY NOTES: NORMAL
LAB AP SPEC A DDX: NORMAL
LAB AP SPEC A MORPHOLOGY: NORMAL
LAB AP SPEC A PROCEDURE: NORMAL
T3RU NFR SERPL: NORMAL %

## 2024-07-08 ENCOUNTER — CLINICAL SUPPORT (OUTPATIENT)
Dept: DERMATOLOGY | Facility: CLINIC | Age: 44
End: 2024-07-08
Payer: COMMERCIAL

## 2024-07-08 DIAGNOSIS — L30.9 DERMATITIS, UNSPECIFIED: Primary | ICD-10-CM

## 2024-07-08 PROCEDURE — 99214 OFFICE O/P EST MOD 30 MIN: CPT | Mod: ,,, | Performed by: STUDENT IN AN ORGANIZED HEALTH CARE EDUCATION/TRAINING PROGRAM

## 2024-07-08 RX ORDER — HALOBETASOL PROPIONATE 0.5 MG/G
CREAM TOPICAL 2 TIMES DAILY PRN
Qty: 50 G | Refills: 5 | Status: SHIPPED | OUTPATIENT
Start: 2024-07-08

## 2024-07-08 NOTE — PROGRESS NOTES
[facility]  Norbert Sy MD    [address] [place] [address]  [phone]    Fax: [phone]    Patient Name: Ashly Trent  Medical Record Number: [id number]  PCP: Rosaura Manzanares FNP  Age: 43 y.o. : 1980  Contact: There are no phone numbers on file.    History of Present Illness:     Ashly Trent is a 43 y.o.  female here for follow up of rash. Biopsy revealed perivascular dermatitis with spongiosis. I suspect drug eruption vs nummular dermatitis. She has continued to flare    The patient has no other concerns today.    Review of Systems:     Unremarkable other than mentioned above.     Physical Exam:     General: Relaxed, oriented, alert    Skin examination of the scalp, face, neck, chest, back, abdomen, upper extremities and lower extremities were normal except for as listed below      Assessment and Plan:     1.  Dermatitis Unspecified  -  annular plaques on bilateral forearms  DDx:  nummular eczema vs ACD vs drug eruption     - increase steroid potency to halobetasol   - will message JESI Le about drug holiday of Jardiance   - RTC 6-8 weeks as we will likely need to do trial of drug holidays  of various medications       Norbert Sy MD   Mohs Surgery/Dermatologic Oncology  Dermatology

## 2024-07-08 NOTE — Clinical Note
Rosaura,   Thanks for taking care of Ms. Trent. She has a rash that could be a drug eruption based on exam and biopsy results. I was hoping to start some drug holidays. I think doing a Jardiance drug holiday first makes sense based on level of suspicion. Lalitha Jovani prescribed it I believe but is no longer at [facility]. She has an apptmt with you on 8/21. Would it be possible for you to see her sooner to possibly replace this medication? It seems her DM2 is pretty poorly controlled. Thanks!

## 2024-07-10 ENCOUNTER — PATIENT MESSAGE (OUTPATIENT)
Dept: PRIMARY CARE CLINIC | Facility: CLINIC | Age: 44
End: 2024-07-10
Payer: COMMERCIAL

## 2024-07-12 ENCOUNTER — OFFICE VISIT (OUTPATIENT)
Dept: PRIMARY CARE CLINIC | Facility: CLINIC | Age: 44
End: 2024-07-12
Payer: COMMERCIAL

## 2024-07-12 VITALS
HEART RATE: 90 BPM | OXYGEN SATURATION: 99 % | DIASTOLIC BLOOD PRESSURE: 86 MMHG | TEMPERATURE: 98 F | WEIGHT: 293 LBS | HEIGHT: 66 IN | BODY MASS INDEX: 47.09 KG/M2 | SYSTOLIC BLOOD PRESSURE: 134 MMHG

## 2024-07-12 DIAGNOSIS — F32.A DEPRESSION, UNSPECIFIED DEPRESSION TYPE: ICD-10-CM

## 2024-07-12 DIAGNOSIS — E11.65 TYPE 2 DIABETES MELLITUS WITH HYPERGLYCEMIA, WITHOUT LONG-TERM CURRENT USE OF INSULIN: Primary | ICD-10-CM

## 2024-07-12 DIAGNOSIS — E78.2 MIXED HYPERLIPIDEMIA: ICD-10-CM

## 2024-07-12 DIAGNOSIS — I10 ESSENTIAL HYPERTENSION, BENIGN: ICD-10-CM

## 2024-07-12 RX ORDER — SEMAGLUTIDE 0.68 MG/ML
0.5 INJECTION, SOLUTION SUBCUTANEOUS
Qty: 3 EACH | Refills: 3 | Status: SHIPPED | OUTPATIENT
Start: 2024-07-12

## 2024-07-12 NOTE — PROGRESS NOTES
"Subjective     Patient ID: Ashly Trent is a 43 y.o. female.    Chief Complaint: Follow-up (Pt states she is here due to switching her Jardiance medication. Wants you to also make a note in he chart stating that she is not able to take capsules states "it will not go down, its hard to swallow".)    Pt presents for a diabetes follow-up. Pt states she has taken metformin in the past and it caused diarrhea. Unable to tolerate jardiance due to rash.   Protective Sensation (w/ 10 gram monofilament):  Right: Intact  Left: Intact    Visual Inspection:  Normal -  Bilateral    Pedal Pulses:   Right: Present  Left: Present    Posterior Tibialis Pulses:   Right:Present  Left: Present         Review of Systems   Constitutional:  Negative for activity change, appetite change, chills, fatigue and fever.   HENT:  Negative for nasal congestion, ear discharge, nosebleeds, postnasal drip, rhinorrhea, sinus pressure/congestion, sneezing, sore throat and tinnitus.    Eyes:  Negative for pain, discharge, redness and itching.   Respiratory:  Negative for cough, choking, chest tightness, shortness of breath and wheezing.    Cardiovascular:  Negative for chest pain.   Gastrointestinal:  Negative for abdominal distention, abdominal pain, blood in stool, change in bowel habit, constipation, diarrhea, nausea and vomiting.   Genitourinary:  Negative for decreased urine volume, dysuria, flank pain, frequency, menstrual irregularity and menstrual problem.   Musculoskeletal:  Negative for back pain and gait problem.   Integumentary:  Negative for wound, breast mass and breast discharge.   Allergic/Immunologic: Negative for immunocompromised state.   Neurological:  Negative for dizziness, light-headedness and headaches.   Psychiatric/Behavioral:  Negative for agitation, behavioral problems and hallucinations.    Breast: Negative for mass         Objective     Physical Exam  Vitals and nursing note reviewed.   Constitutional:       Appearance: " Normal appearance.   Cardiovascular:      Rate and Rhythm: Normal rate and regular rhythm.      Heart sounds: Normal heart sounds.   Pulmonary:      Effort: Pulmonary effort is normal.      Breath sounds: Normal breath sounds.   Musculoskeletal:         General: Normal range of motion.   Feet:      Right foot:      Protective Sensation: 10 sites tested.  10 sites sensed.      Left foot:      Protective Sensation: 10 sites tested.  10 sites sensed.   Neurological:      Mental Status: She is alert and oriented to person, place, and time.   Psychiatric:         Mood and Affect: Mood normal.         Behavior: Behavior normal.            Assessment and Plan     1. Type 2 diabetes mellitus with hyperglycemia, without long-term current use of insulin  -     Hemoglobin A1C; Future; Expected date: 07/12/2024  -     CBC Auto Differential; Future; Expected date: 07/12/2024  -     Comprehensive Metabolic Panel; Future; Expected date: 07/12/2024  -     Lipid Panel; Future; Expected date: 07/12/2024  -     TSH; Future; Expected date: 07/12/2024  -     semaglutide (OZEMPIC) 0.25 mg or 0.5 mg (2 mg/3 mL) pen injector; Inject 0.25mg into the skin every week for 4 weeks then 0.5mg every week  Dispense: 3 each; Refill: 3    2. Depression, unspecified depression type    3. Essential hypertension, benign    4. Mixed hyperlipidemia        Will call pt with lab results. She will make an eye appt and have the visit sent here. If insurance will not cover ozempic I will send in lantus.          Follow up in about 3 months (around 10/12/2024).     0 = independent

## 2024-07-15 ENCOUNTER — PATIENT MESSAGE (OUTPATIENT)
Dept: ADMINISTRATIVE | Facility: HOSPITAL | Age: 44
End: 2024-07-15

## 2024-07-15 ENCOUNTER — PATIENT MESSAGE (OUTPATIENT)
Dept: PRIMARY CARE CLINIC | Facility: CLINIC | Age: 44
End: 2024-07-15
Payer: COMMERCIAL

## 2024-08-01 ENCOUNTER — PATIENT MESSAGE (OUTPATIENT)
Dept: PRIMARY CARE CLINIC | Facility: CLINIC | Age: 44
End: 2024-08-01
Payer: COMMERCIAL

## 2024-08-02 ENCOUNTER — PATIENT MESSAGE (OUTPATIENT)
Dept: PRIMARY CARE CLINIC | Facility: CLINIC | Age: 44
End: 2024-08-02
Payer: COMMERCIAL

## 2024-08-02 ENCOUNTER — OFFICE VISIT (OUTPATIENT)
Dept: PRIMARY CARE CLINIC | Facility: CLINIC | Age: 44
End: 2024-08-02
Payer: COMMERCIAL

## 2024-08-02 ENCOUNTER — HOSPITAL ENCOUNTER (OUTPATIENT)
Dept: RADIOLOGY | Facility: HOSPITAL | Age: 44
Discharge: HOME | End: 2024-08-02
Attending: NURSE PRACTITIONER
Payer: COMMERCIAL

## 2024-08-02 VITALS
TEMPERATURE: 98 F | RESPIRATION RATE: 18 BRPM | SYSTOLIC BLOOD PRESSURE: 118 MMHG | OXYGEN SATURATION: 99 % | WEIGHT: 293 LBS | DIASTOLIC BLOOD PRESSURE: 74 MMHG | BODY MASS INDEX: 47.09 KG/M2 | HEART RATE: 92 BPM | HEIGHT: 66 IN

## 2024-08-02 DIAGNOSIS — M54.42 CHRONIC MIDLINE LOW BACK PAIN WITH LEFT-SIDED SCIATICA: Primary | ICD-10-CM

## 2024-08-02 DIAGNOSIS — G89.29 CHRONIC MIDLINE LOW BACK PAIN WITH LEFT-SIDED SCIATICA: Primary | ICD-10-CM

## 2024-08-02 DIAGNOSIS — M54.42 CHRONIC MIDLINE LOW BACK PAIN WITH LEFT-SIDED SCIATICA: ICD-10-CM

## 2024-08-02 DIAGNOSIS — G89.29 CHRONIC MIDLINE LOW BACK PAIN WITH LEFT-SIDED SCIATICA: ICD-10-CM

## 2024-08-02 PROCEDURE — 72100 X-RAY EXAM L-S SPINE 2/3 VWS: CPT | Mod: 26,,, | Performed by: RADIOLOGY

## 2024-08-02 PROCEDURE — 72100 X-RAY EXAM L-S SPINE 2/3 VWS: CPT | Mod: TC

## 2024-08-02 RX ORDER — KETOROLAC TROMETHAMINE 30 MG/ML
60 INJECTION, SOLUTION INTRAMUSCULAR; INTRAVENOUS
Status: COMPLETED | OUTPATIENT
Start: 2024-08-02 | End: 2024-08-02

## 2024-08-02 RX ADMIN — KETOROLAC TROMETHAMINE 60 MG: 30 INJECTION, SOLUTION INTRAMUSCULAR; INTRAVENOUS at 08:08

## 2024-08-02 NOTE — PROGRESS NOTES
Subjective     Patient ID: Ashly Trent is a 43 y.o. female.    Chief Complaint: Left-sided pain    Pt presents with low back pain that radiates to left groin and left leg x several months.       Review of Systems   Constitutional:  Negative for activity change, appetite change, chills, fatigue and fever.   HENT:  Negative for nasal congestion, ear discharge, nosebleeds, postnasal drip, rhinorrhea, sinus pressure/congestion, sneezing, sore throat and tinnitus.    Eyes:  Negative for pain, discharge, redness and itching.   Respiratory:  Negative for cough, choking, chest tightness, shortness of breath and wheezing.    Cardiovascular:  Negative for chest pain.   Gastrointestinal:  Negative for abdominal distention, abdominal pain, blood in stool, change in bowel habit, constipation, diarrhea, nausea and vomiting.   Genitourinary:  Negative for decreased urine volume, dysuria, flank pain and frequency.   Musculoskeletal:  Positive for back pain. Negative for gait problem.   Integumentary:  Negative for wound, breast mass and breast discharge.   Allergic/Immunologic: Negative for immunocompromised state.   Neurological:  Negative for dizziness, light-headedness and headaches.   Psychiatric/Behavioral:  Negative for agitation, behavioral problems and hallucinations.    Breast: Negative for mass         Objective     Physical Exam  Vitals and nursing note reviewed.   Constitutional:       Appearance: Normal appearance.   Cardiovascular:      Rate and Rhythm: Normal rate and regular rhythm.      Heart sounds: Normal heart sounds.   Pulmonary:      Effort: Pulmonary effort is normal.      Breath sounds: Normal breath sounds.   Musculoskeletal:         General: Normal range of motion.   Neurological:      Mental Status: She is alert and oriented to person, place, and time.   Psychiatric:         Mood and Affect: Mood normal.         Behavior: Behavior normal.            Assessment and Plan     1. Chronic midline low back  pain with left-sided sciatica  -     ketorolac injection 60 mg  -     X-Ray Lumbar Spine AP And Lateral; Future; Expected date: 08/02/2024  -     Ambulatory referral/consult to Pain Clinic; Future; Expected date: 08/09/2024  -     Ambulatory referral/consult to Physical/Occupational Therapy; Future; Expected date: 08/09/2024        Will call pt with xray results and appt for pain tx. Entered an order for physical therapy for low back pain also. She states she will make an eye appt and have the visit sent here.          Follow up if symptoms worsen or fail to improve.

## 2024-08-12 ENCOUNTER — PATIENT MESSAGE (OUTPATIENT)
Dept: PRIMARY CARE CLINIC | Facility: CLINIC | Age: 44
End: 2024-08-12
Payer: COMMERCIAL

## 2024-08-12 RX ORDER — IBUPROFEN 800 MG/1
800 TABLET ORAL 3 TIMES DAILY
Qty: 20 TABLET | Refills: 0 | Status: SHIPPED | OUTPATIENT
Start: 2024-08-12

## 2024-08-13 ENCOUNTER — CLINICAL SUPPORT (OUTPATIENT)
Dept: REHABILITATION | Facility: HOSPITAL | Age: 44
End: 2024-08-13
Payer: COMMERCIAL

## 2024-08-13 DIAGNOSIS — M54.42 CHRONIC MIDLINE LOW BACK PAIN WITH LEFT-SIDED SCIATICA: ICD-10-CM

## 2024-08-13 DIAGNOSIS — G89.29 CHRONIC MIDLINE LOW BACK PAIN WITH LEFT-SIDED SCIATICA: ICD-10-CM

## 2024-08-13 PROCEDURE — 97162 PT EVAL MOD COMPLEX 30 MIN: CPT

## 2024-08-13 NOTE — PLAN OF CARE
"[facility]  Physical Therapy Initial Evaluation    Name: Ashly Trent  Clinic Number: [id number]    Therapy Diagnosis:   Encounter Diagnosis   Name Primary?    Chronic midline low back pain with left-sided sciatica      Physician: Rosaura Manzanares FNP    Physician Orders: PT Eval and Treat  Medical Diagnosis from Referral: M54.42,G89.29 (ICD-10-CM) - Chronic midline low back pain with left-sided sciatica  Evaluation Date: 8/13/2024  Authorization Period Expiration: Only initial evaluation approved by [employer]. Subsequent visits require prior authorization. .  Plan of Care Expiration: 9/27/2024  Visit # / Visits authorized: 1/Only initial evaluation approved by [employer]. Subsequent visits require prior authorization.     Time In: 1352  Time Out: 1430  Total Appointment Time (timed & untimed codes): 38 minutes    Precautions: Standard and Diabetes    Subjective     Date of onset: several years ago    History of current condition - Ashly reports intermittent low back pain with left lower extremity radiculopathy. Patient reports her radicular symptoms stop in the lower gluteal region. Patient reports difficulty standing at work for greater than 1 hour due to the pain. Patient reports she takes 800 mg ibuprofen which helps "a little bit" with her pain. Patient reports she has had waxing/waning episodes of low back pain for several years now. Patient reports she has been referred to Dr. Day (pain management) who she will see on 8/28/2024.      Falls: no    Imaging: x-ray of the lumbar spine from 8/2/2024: "Accentuation of normal lumbar lordosis. Moderate loss of disc space height at L5-S1. Mild marginal vertebral body osteophyte formation noted at L5-S1. Lumbar vertebral body heights appear maintained. Surgical clips project over the right upper quadrant of the abdomen."    Prior Therapy: none  Social History: lives with their family  Steps/Stairs: none  Occupation: manager at " [employer]  Driving: Yes  Durable Medical Equipment: none  Dominant Extremity: right  Affected Extremity: left  Prior Level of Function: independent with all functional mobility without assistive device  Current Level of Function: same as prior level of function but with increased low back pain    Pain:  Current 5/10, worst 10/10, best 3/10   Location: left low back with radiculopathy into the left lower extremity to the lower gluteal region  Description: Sharp  Aggravating Factors: Standing and Walking  Easing Factors: pain medication and rest    Pts goals: Patient wishes to decrease the pain in her left lower back to improve her quality of life and ability to stand longer for work-related tasks.     Medical History:   Past Medical History:   Diagnosis Date    Acid reflux     Diabetes mellitus, type 2     HTN (hypertension)     Hyperlipidemia     Migraine with aura     Obesity      Surgical History:   Ashly Trent  has a past surgical history that includes Cholecystectomy and Tonsillectomy.    Medications:   Ashly has a current medication list which includes the following prescription(s): amlodipine, atorvastatin, bd ultra-fine colleen pen needle, gabapentin, halobetasol, ibuprofen, losartan, norethindrone, pen needle, diabetic, pen needle, diabetic, and ozempic.    Allergies:   Review of patient's allergies indicates:   Allergen Reactions    Amoxicillin Rash      Objective     Posture:  Standing lordosis: Increased  Sitting lordosis: Increased  Iliac crest height: equal  PSIS height: equal  Pelvic rotation/torsion: No  Scoliosis: No  Lateral shift: No    Thoracolumbar range of motion:   AROM Pain/Dysfunction with Movement   Flexion 25*    Extension WFL    Right lateral flexion WFL    Left lateral flexion WFL    Right rotation 30*    Left rotation 30*      Manual muscle testing:   Right Left   Hip flexion MMT number: 4+/5 MMT strength: 4/5   Hip abduction MMT strength: 4/5 MMT strength: 4/5   Knee extension  "MMT strength: 4+/5 MMT strength: 4+/5   Knee flexion  MMT strength: 4+/5 MMT strength: 4/5   Ankle dorsiflexion MMT strength: 4+/5 MMT strength: 4+/5   Ankle plantar flexion  MMT strength: 4+/5 MMT strength: 4+/5     Hip/knee range of motion:   Right Left    Hip flexion (120) 90* 100*   Hip extension WFL WFL   Hip abduction WFL WFL   Hip adduction WFL WFL   Internal rotation (45) 10* 10*   External rotation (45) WFL WFL   Hamstring 90/90 (-10) -30* -35*   Knee extension WFL WFL   Knee flexion (120) WFL WFL     Clinical Special Tests:  SLR test < 60 degrees: right Negative; left Positive  Sitting slump test: right Negative; left Positive  Piriformis test: right Negative; left Positive    Gait:  Weight bearing precautions: WBAT  Assistive device: none  Ambulation distance and deviations: short community distances; decreased step lengths; decreased left stance time with antalgic gait pattern noted    Palpation: tenderness on palpation of the left lumbar musculature, gluteals, and piriformis    Dermatome: WFL    Limitation/Restriction for FOTO Intake Survey    Therapist reviewed FOTO scores for Ashly Trent on 8/13/2024.   FOTO documents entered into EPIC - see Media section.    Limitation Score: 46%       Patient Education and Home Exercises     Education provided: Patient educated on the importance of completing skilled physical therapy treatment and home exercise program as prescribed to maximize functional gains.     Written Home Exercises Provided: yes. Exercises were reviewed and Ashly was able to demonstrate them prior to the end of the session. Ashly demonstrated fair understanding of the education provided.     See EMR under "Patient Instructions" for exercises provided during therapy sessions.    Assessment     Ashly is a 43 y.o. female referred to outpatient physical therapy with a medical diagnosis of M54.42,G89.29 (ICD-10-CM) - Chronic midline low back pain with left-sided sciatica. Pt presents to " PT with low back pain with left lower extremity radiculopathy, decreased lumbar flexion, range of motion, left greater than right lower extremity weakness, abnormal thoracolumbar posture, and abnormal gait mechanics. Patient may benefit from further diagnostic imaging should her symptoms not resolve with skilled physical therapy treatment.    Pt prognosis is Fair.   Pt will benefit from skilled outpatient Physical Therapy to address the deficits stated above and in the chart below, provide pt/family education, and to maximize pt's level of independence.     Plan of care discussed with patient: Yes  Pt's spiritual, cultural and educational needs considered and pt agreeable to plan of care and goals as stated below:     Anticipated Barriers for therapy: compliance with home exercise program    Medical Necessity is demonstrated by the following:  History  Co-morbidities and personal factors that may impact the plan of care [] LOW: no personal factors / co-morbidities  [x] MODERATE: 1-2 personal factors / co-morbidities  [] HIGH: 3+ personal factors / co-morbidities    Moderate / High Support Documentation:   Co-morbidities affecting plan of care: obesity    Personal Factors:   lifestyle     Examination  Body Structures and Functions, activity limitations and participation restrictions that may impact the plan of care [] LOW: addressing 1-2 elements  [x] MODERATE: 3+ elements  [] HIGH: 4+ elements (please support below)    Moderate / High Support Documentation: range of motion, strength, gait mechanics, and posture     Clinical Presentation [x] LOW: stable  [] MODERATE: Evolving  [] HIGH: Unstable     Decision Making/ Complexity Score: moderate       Goals:    Short Term Goals:  Patient will demonstrate independence with home exercise program to ensure carryover of treatment.  Patient will demonstrate 35 degrees of thoracolumbar flexion active range of motion to improve functional use of the thoracolumbar  spine.  Patient will improve bilateral lower extremity strength to 4+/5 to improve independence and safety with bed mobility, transfers, and gait.  Patient will ambulate 150 feet without assistive device with independence to improve independence and safety with gait.  Patient will report a reduction in low back and left lower extremity pain from 10/10 to 8/10 at worst to improve quality of life.     Long Term Goals:  Patient will demonstrate 45 degrees of thoracolumbar flexion active range of motion to improve functional use of the thoracolumbar spine.  Patient will improve bilateral lower extremity strength to 5/5 to improve independence and safety with bed mobility, transfers, and gait.  Patient will ambulate 300 feet without an assistive device with independence with normal gait mechanics including up/down curbs and ramps to improve independence and safety with community ambulation.  Patient will report a reduction in low back and left lower extremity pain from 10/10 to 6/10 at worst to improve quality of life.     Plan     Plan of care Certification: 8/13/2024 to 9/27/2024.    Outpatient Physical Therapy 2 times weekly for 6 weeks to include the following interventions: Electrical Stimulation (IFC/premodulated IFC), Gait Training, Manual Therapy, Moist Heat/ Ice, Neuromuscular Re-ed, Patient Education, Therapeutic Activities, and Therapeutic Exercise.     Clinical Information for Insurance Authorization     Dates of Services: 8/13/2024 to 9/27/2024  Discharge Plan: Patient will be discharged from skilled physical therapy treatment once all goals have been met, patient has plateaued, or physician/insurance requests discontinuation of care. Patient will be discharged with a home exercise program.   Type of therapy: Rehabilitative  ICD-10 Diagnosis Code(s): M54.42,G89.29 (ICD-10-CM) - Chronic midline low back pain with left-sided sciatica  Which side is symptomatic? Left  Surgical: No  Surgical procedure:  N/A  Surgery date: N/A.  Presenting symptoms/diagnoses are unspecified in nature.  Presenting symptoms are radiating in nature.   The rehabilitation is not related to a diagnosis of cancer.  The rehabilitation is not related to a diagnosis of lymphedema.  Patient's clinical presentation is:  Severe objective and functional deficits: consistent intense symptoms with severe loss of range of motion, strength, or ability to perform daily tasks  CPT Codes Requested:  49264 [therapeutic exercise], 32015 [neuromuscular re-education], 72299 [gait training], 91875 [manual therapy], 05574 [therapeutic activities], 16399 [unattended electrical stimulation], and 48225 [mechanical traction]      Garrett Burk, PT, DPT  8/13/2024

## 2024-08-14 NOTE — PATIENT INSTRUCTIONS
HOME EXERCISE PROGRAM  Created by Garrett Burk PT, DPT  Aug 13th, 2024  View videos at [email or web address]        PELVIC TILT - SUPINE    Lie on your back with your knees bent. Next, arch your low back and then flatten it repeatedly. Your pelvis should tilt forward and back during the movement. Move through a comfortable range of motion.    Video # [id number] Repeat 20 Times   Hold 3 Seconds   Complete 1 Set   Perform 2 Times a Day          SINGLE KNEE TO CHEST STRETCH WITH TOWEL - SKTC    Lie on your back and place a towel around the undersurface of your thigh. Hold both ends of the towel and use your arms to pull up your leg into hip flexion for a gentle stretch. Return to starting position and repeat.    Video # [id number] Repeat 3 Times   Hold 20 Seconds   Complete 1 Set   Perform 2 Times a Day          Hamstring Stretch    Place foot on stool. Slowly lean forward, keeping back straight and bending at hips until a stretch is felt in back of leg.    Repeat on both legs. Repeat 3 Times   Hold 20 Seconds   Complete 1 Set   Perform 2 Times a Day

## 2024-08-15 ENCOUNTER — CLINICAL SUPPORT (OUTPATIENT)
Dept: REHABILITATION | Facility: HOSPITAL | Age: 44
End: 2024-08-15
Payer: COMMERCIAL

## 2024-08-15 DIAGNOSIS — M54.42 CHRONIC MIDLINE LOW BACK PAIN WITH LEFT-SIDED SCIATICA: Primary | ICD-10-CM

## 2024-08-15 DIAGNOSIS — G89.29 CHRONIC MIDLINE LOW BACK PAIN WITH LEFT-SIDED SCIATICA: Primary | ICD-10-CM

## 2024-08-15 PROCEDURE — 97112 NEUROMUSCULAR REEDUCATION: CPT | Mod: CQ

## 2024-08-15 PROCEDURE — 97110 THERAPEUTIC EXERCISES: CPT | Mod: CQ

## 2024-08-15 NOTE — PROGRESS NOTES
[facility]   Physical Therapy Treatment Note      Name: Ashly Trent  Clinic Number: [id number]    Therapy Diagnosis:   Encounter Diagnosis   Name Primary?    Chronic midline low back pain with left-sided sciatica Yes     Physician: Rosaura Manzanares FNP    Visit Date: 8/15/2024    Physician Orders: PT Eval and Treat  Medical Diagnosis from Referral: M54.42,G89.29 (ICD-10-CM) - Chronic midline low back pain with left-sided sciatica  Evaluation Date: 8/13/2024  Authorization Period Expiration: 11/13/2024  Plan of Care Expiration: 9/27/2024  Visit # / Visits authorized: 2/13     PTA Visit #: 1/5     Time In: 1:24 pm  Time Out: 2:04 pm  Total Billable Time: 40 minutes    Subjective     Pt reports: Patient reports mild back pain on arrival.  Reports increased pain with standing activity.  She was compliant with home exercise program.  Response to previous treatment: no complaints   Functional change: no change noted    Pain: 2/10  Location: low back      Objective      Objective Measures updated at progress report unless specified.     Treatment     Ashly received the treatments listed below:      therapeutic exercises to develop strength, endurance, ROM, flexibility, posture, and core stabilization for 15 minutes including:  LTR--10 x 10 sec  Piriformis stretch--3 x 30 sec  Figure 4 stretch--3 x 30 sec  PPT-15x    manual therapy techniques: Joint mobilizations, Manual traction, Myofacial release, Soft tissue Mobilization, and Friction Massage were applied to the:   Low back for 0 minutes, including:  -    neuromuscular re-education activities to improve: Balance, Coordination, Kinesthetic Sense, Proprioception, and Posture for 25 minutes. The following activities were included:  PPT with bridge--2 x 10  PPT with march--2 x 10  Supine ball hugs--3 x 10  SL hypers leaning on mat--2 x 10    therapeutic activities to improve functional performance for 0 minutes,  including:  -    Patient Education and Home Exercises       Education provided:   - review of home exercise program and current Plan of Care/rationale of treatment.    Written Home Exercises Provided: Patient instructed to cont prior HEP. Exercises were reviewed and Ashly was able to demonstrate them prior to the end of the session.  Ashly demonstrated good understanding of the education provided. See EMR under Patient Instructions for exercises provided during therapy sessions    Assessment     At Evaluation:  Ashly is a 43 y.o. female referred to outpatient physical therapy with a medical diagnosis of M54.42,G89.29 (ICD-10-CM) - Chronic midline low back pain with left-sided sciatica. Pt presents to PT with low back pain with left lower extremity radiculopathy, decreased lumbar flexion, range of motion, left greater than right lower extremity weakness, abnormal thoracolumbar posture, and abnormal gait mechanics. Patient may benefit from further diagnostic imaging should her symptoms not resolve with skilled physical therapy treatment.    Current Assessment:  Arrived for first visit after eval.  Reports compliance with initial HEP.  Still reports pain with prolonged standing.  Educated patient on purpose of all activities.  Session focused on stabilization, stretching, and posterior hip strength without issue.  Needing cues to properly perform PPT.  No pain after session.  Cont POC.    Ashly Is progressing towards her goals.   Pt prognosis is Good.     Pt will continue to benefit from skilled outpatient physical therapy to address the deficits listed in the problem list box on initial evaluation, provide pt/family education and to maximize pt's level of independence in the home and community environment.     Pt's spiritual, cultural and educational needs considered and pt agreeable to plan of care and goals.     Anticipated barriers to physical therapy: none    Goals:   Short Term Goals:  Patient will  demonstrate independence with home exercise program to ensure carryover of treatment.  Patient will demonstrate 35 degrees of thoracolumbar flexion active range of motion to improve functional use of the thoracolumbar spine.  Patient will improve bilateral lower extremity strength to 4+/5 to improve independence and safety with bed mobility, transfers, and gait.  Patient will ambulate 150 feet without assistive device with independence to improve independence and safety with gait.  Patient will report a reduction in low back and left lower extremity pain from 10/10 to 8/10 at worst to improve quality of life.      Long Term Goals:  Patient will demonstrate 45 degrees of thoracolumbar flexion active range of motion to improve functional use of the thoracolumbar spine.  Patient will improve bilateral lower extremity strength to 5/5 to improve independence and safety with bed mobility, transfers, and gait.  Patient will ambulate 300 feet without an assistive device with independence with normal gait mechanics including up/down curbs and ramps to improve independence and safety with community ambulation.  Patient will report a reduction in low back and left lower extremity pain from 10/10 to 6/10 at worst to improve quality of life.     Plan     Plan of care Certification: 8/13/2024 to 9/27/2024.     Outpatient Physical Therapy 2 times weekly for 6 weeks to include the following interventions: Electrical Stimulation (IFC/premodulated IFC), Gait Training, Manual Therapy, Moist Heat/ Ice, Neuromuscular Re-ed, Patient Education, Therapeutic Activities, and Therapeutic Exercise.     Jg Gonsalez, PAM   08/15/2024

## 2024-08-20 ENCOUNTER — CLINICAL SUPPORT (OUTPATIENT)
Dept: REHABILITATION | Facility: HOSPITAL | Age: 44
End: 2024-08-20
Payer: COMMERCIAL

## 2024-08-20 DIAGNOSIS — G89.29 CHRONIC MIDLINE LOW BACK PAIN WITH LEFT-SIDED SCIATICA: Primary | ICD-10-CM

## 2024-08-20 DIAGNOSIS — M54.42 CHRONIC MIDLINE LOW BACK PAIN WITH LEFT-SIDED SCIATICA: Primary | ICD-10-CM

## 2024-08-20 PROCEDURE — 97110 THERAPEUTIC EXERCISES: CPT | Mod: CQ

## 2024-08-20 PROCEDURE — 97112 NEUROMUSCULAR REEDUCATION: CPT | Mod: CQ

## 2024-08-20 NOTE — PROGRESS NOTES
[facility]   Physical Therapy Treatment Note      Name: Ashly Trent  Clinic Number: [id number]    Therapy Diagnosis:   Encounter Diagnosis   Name Primary?    Chronic midline low back pain with left-sided sciatica Yes     Physician: Rosaura Manzanares FNP    Visit Date: 8/20/2024    Physician Orders: PT Eval and Treat  Medical Diagnosis from Referral: M54.42,G89.29 (ICD-10-CM) - Chronic midline low back pain with left-sided sciatica  Evaluation Date: 8/13/2024  Authorization Period Expiration: 11/13/2024  Plan of Care Expiration: 9/27/2024  Visit # / Visits authorized: 3/13     PTA Visit #: 2/5     Time In: 1:45 pm  Time Out: 2:28 pm  Total Billable Time: 41 billable minutes (2 non billed minutes)    Subjective     Pt reports: Patient reports mild back pain on arrival.  Reports increased pain with standing activity.  She was compliant with home exercise program.  Response to previous treatment: no complaints   Functional change: no change noted    Pain: 5/10  Location: low back      Objective      Objective Measures updated at progress report unless specified.     Treatment     Ashly received the treatments listed below:      therapeutic exercises to develop strength, endurance, ROM, flexibility, posture, and core stabilization for 15 minutes including:  LTR--10 x 10 sec  Piriformis stretch--3 x 30 sec  Figure 4 stretch--3 x 30 sec  PPT-15x    manual therapy techniques: Joint mobilizations, Manual traction, Myofacial release, Soft tissue Mobilization, and Friction Massage were applied to the:   Low back for 0 minutes, including:  -    neuromuscular re-education activities to improve: Balance, Coordination, Kinesthetic Sense, Proprioception, and Posture for 26 minutes. The following activities were included:  PPT with bridge--2 x 10  PPT with march--2 x 10  Supine ball hugs--3 x 10  SL hypers leaning on mat--2 x 10    therapeutic activities to improve functional performance for 0  minutes, including:  -    Patient Education and Home Exercises       Education provided:   - review of home exercise program and current Plan of Care/rationale of treatment.    Written Home Exercises Provided: Patient instructed to cont prior HEP. Exercises were reviewed and Ashly was able to demonstrate them prior to the end of the session.  Ashly demonstrated good understanding of the education provided. See EMR under Patient Instructions for exercises provided during therapy sessions    Assessment     At Evaluation:  Ashly is a 43 y.o. female referred to outpatient physical therapy with a medical diagnosis of M54.42,G89.29 (ICD-10-CM) - Chronic midline low back pain with left-sided sciatica. Pt presents to PT with low back pain with left lower extremity radiculopathy, decreased lumbar flexion, range of motion, left greater than right lower extremity weakness, abnormal thoracolumbar posture, and abnormal gait mechanics. Patient may benefit from further diagnostic imaging should her symptoms not resolve with skilled physical therapy treatment.    Current Assessment:  Ashly Arrived with pain in low back.   Reports compliance with initial HEP.  Still reports pain with prolonged standing.  Session focused on stabilization, stretching, and posterior hip strength without issue.  Still needing cues to properly perform PPT.  Decreased pain after session.  Cont POC.    Ashly Is progressing towards her goals.   Pt prognosis is Good.     Pt will continue to benefit from skilled outpatient physical therapy to address the deficits listed in the problem list box on initial evaluation, provide pt/family education and to maximize pt's level of independence in the home and community environment.     Pt's spiritual, cultural and educational needs considered and pt agreeable to plan of care and goals.     Anticipated barriers to physical therapy: none    Goals:   Short Term Goals:  Patient will demonstrate independence  with home exercise program to ensure carryover of treatment.  Patient will demonstrate 35 degrees of thoracolumbar flexion active range of motion to improve functional use of the thoracolumbar spine.  Patient will improve bilateral lower extremity strength to 4+/5 to improve independence and safety with bed mobility, transfers, and gait.  Patient will ambulate 150 feet without assistive device with independence to improve independence and safety with gait.  Patient will report a reduction in low back and left lower extremity pain from 10/10 to 8/10 at worst to improve quality of life.      Long Term Goals:  Patient will demonstrate 45 degrees of thoracolumbar flexion active range of motion to improve functional use of the thoracolumbar spine.  Patient will improve bilateral lower extremity strength to 5/5 to improve independence and safety with bed mobility, transfers, and gait.  Patient will ambulate 300 feet without an assistive device with independence with normal gait mechanics including up/down curbs and ramps to improve independence and safety with community ambulation.  Patient will report a reduction in low back and left lower extremity pain from 10/10 to 6/10 at worst to improve quality of life.     Plan     Plan of care Certification: 8/13/2024 to 9/27/2024.     Outpatient Physical Therapy 2 times weekly for 6 weeks to include the following interventions: Electrical Stimulation (IFC/premodulated IFC), Gait Training, Manual Therapy, Moist Heat/ Ice, Neuromuscular Re-ed, Patient Education, Therapeutic Activities, and Therapeutic Exercise.     Jg Gonsalez, PAM   08/20/2024

## 2024-08-22 ENCOUNTER — CLINICAL SUPPORT (OUTPATIENT)
Dept: REHABILITATION | Facility: HOSPITAL | Age: 44
End: 2024-08-22
Payer: COMMERCIAL

## 2024-08-22 DIAGNOSIS — M54.42 CHRONIC MIDLINE LOW BACK PAIN WITH LEFT-SIDED SCIATICA: Primary | ICD-10-CM

## 2024-08-22 DIAGNOSIS — G89.29 CHRONIC MIDLINE LOW BACK PAIN WITH LEFT-SIDED SCIATICA: Primary | ICD-10-CM

## 2024-08-22 PROCEDURE — 97110 THERAPEUTIC EXERCISES: CPT | Mod: CQ

## 2024-08-22 PROCEDURE — 97140 MANUAL THERAPY 1/> REGIONS: CPT | Mod: CQ

## 2024-08-22 PROCEDURE — 97112 NEUROMUSCULAR REEDUCATION: CPT | Mod: CQ

## 2024-08-22 NOTE — PROGRESS NOTES
[facility]   Physical Therapy Treatment Note      Name: Ashly Trent  Clinic Number: [id number]    Therapy Diagnosis:   Encounter Diagnosis   Name Primary?    Chronic midline low back pain with left-sided sciatica Yes     Physician: Rosaura Manzanares FNP    Visit Date: 8/22/2024    Physician Orders: PT Eval and Treat  Medical Diagnosis from Referral: M54.42,G89.29 (ICD-10-CM) - Chronic midline low back pain with left-sided sciatica  Evaluation Date: 8/13/2024  Authorization Period Expiration: 11/13/2024  Plan of Care Expiration: 9/27/2024  Visit # / Visits authorized: 4/13     PTA Visit #: 3/5     Time In: 1:45 pm  Time Out: 2:25 pm  Total Billable Time: 40 minutes     Subjective     Pt reports: Patient reports she has increased pain on arrival.    She was compliant with home exercise program.  Response to previous treatment: no complaints   Functional change: no change noted    Pain: 8/10  Location: low back      Objective      Objective Measures updated at progress report unless specified.     Treatment     Ashly received the treatments listed below:      therapeutic exercises to develop strength, endurance, ROM, flexibility, posture, and core stabilization for 15 minutes including:  Nu Step x 5 min  Piriformis stretch--3 x 30 sec  Figure 4 stretch--3 x 30 sec  PPT-15x    (Not Today)  LTR--10 x 10 sec    manual therapy techniques: Joint mobilizations, Manual traction, Myofacial release, Soft tissue Mobilization, and Friction Massage were applied to the:   Low back for 10 minutes, including:  Manual rotation stretch  Manual DKTC  Manual traction    neuromuscular re-education activities to improve: Balance, Coordination, Kinesthetic Sense, Proprioception, and Posture for 15 minutes. The following activities were included:  PPT with bridge--2 x 10  PPT with march--2 x 10  Supine ball hugs--3 x 10  SL hypers leaning on mat--2 x 10    therapeutic activities to improve functional  performance for 0 minutes, including:  -    Patient Education and Home Exercises       Education provided:   - review of home exercise program and current Plan of Care/rationale of treatment.    Written Home Exercises Provided: Patient instructed to cont prior HEP. Exercises were reviewed and Ashly was able to demonstrate them prior to the end of the session.  Ashly demonstrated good understanding of the education provided. See EMR under Patient Instructions for exercises provided during therapy sessions    Assessment     At Evaluation:  Ashly is a 43 y.o. female referred to outpatient physical therapy with a medical diagnosis of M54.42,G89.29 (ICD-10-CM) - Chronic midline low back pain with left-sided sciatica. Pt presents to PT with low back pain with left lower extremity radiculopathy, decreased lumbar flexion, range of motion, left greater than right lower extremity weakness, abnormal thoracolumbar posture, and abnormal gait mechanics. Patient may benefit from further diagnostic imaging should her symptoms not resolve with skilled physical therapy treatment.    Current Assessment:  Ashly Arrived with increased pain in low back.   Reports compliance with initial HEP.  Still reports pain with prolonged standing.  Session focused on stabilization, stretching, and posterior hip strength without issue.  Did add some manuals to help with pain.  Less cues to properly perform PPT.  Decreased pain after session.  Cont POC.    Ashly Is progressing towards her goals.   Pt prognosis is Good.     Pt will continue to benefit from skilled outpatient physical therapy to address the deficits listed in the problem list box on initial evaluation, provide pt/family education and to maximize pt's level of independence in the home and community environment.     Pt's spiritual, cultural and educational needs considered and pt agreeable to plan of care and goals.     Anticipated barriers to physical therapy: none    Goals:    Short Term Goals:  Patient will demonstrate independence with home exercise program to ensure carryover of treatment.  Patient will demonstrate 35 degrees of thoracolumbar flexion active range of motion to improve functional use of the thoracolumbar spine.  Patient will improve bilateral lower extremity strength to 4+/5 to improve independence and safety with bed mobility, transfers, and gait.  Patient will ambulate 150 feet without assistive device with independence to improve independence and safety with gait.  Patient will report a reduction in low back and left lower extremity pain from 10/10 to 8/10 at worst to improve quality of life.      Long Term Goals:  Patient will demonstrate 45 degrees of thoracolumbar flexion active range of motion to improve functional use of the thoracolumbar spine.  Patient will improve bilateral lower extremity strength to 5/5 to improve independence and safety with bed mobility, transfers, and gait.  Patient will ambulate 300 feet without an assistive device with independence with normal gait mechanics including up/down curbs and ramps to improve independence and safety with community ambulation.  Patient will report a reduction in low back and left lower extremity pain from 10/10 to 6/10 at worst to improve quality of life.     Plan     Plan of care Certification: 8/13/2024 to 9/27/2024.     Outpatient Physical Therapy 2 times weekly for 6 weeks to include the following interventions: Electrical Stimulation (IFC/premodulated IFC), Gait Training, Manual Therapy, Moist Heat/ Ice, Neuromuscular Re-ed, Patient Education, Therapeutic Activities, and Therapeutic Exercise.     Jg Gonsalez, PAM   08/22/2024

## 2024-08-27 ENCOUNTER — CLINICAL SUPPORT (OUTPATIENT)
Dept: REHABILITATION | Facility: HOSPITAL | Age: 44
End: 2024-08-27
Payer: COMMERCIAL

## 2024-08-27 DIAGNOSIS — G89.29 CHRONIC MIDLINE LOW BACK PAIN WITH LEFT-SIDED SCIATICA: Primary | ICD-10-CM

## 2024-08-27 DIAGNOSIS — M54.42 CHRONIC MIDLINE LOW BACK PAIN WITH LEFT-SIDED SCIATICA: Primary | ICD-10-CM

## 2024-08-27 PROCEDURE — 97112 NEUROMUSCULAR REEDUCATION: CPT | Mod: CQ

## 2024-08-27 PROCEDURE — 97110 THERAPEUTIC EXERCISES: CPT | Mod: CQ

## 2024-08-27 NOTE — PROGRESS NOTES
[facility]   Physical Therapy Treatment Note      Name: Ashly Trent  Clinic Number: [id number]    Therapy Diagnosis:   Encounter Diagnosis   Name Primary?    Chronic midline low back pain with left-sided sciatica Yes     Physician: Rosaura Manzanares FNP    Visit Date: 8/27/2024    Physician Orders: PT Eval and Treat  Medical Diagnosis from Referral: M54.42,G89.29 (ICD-10-CM) - Chronic midline low back pain with left-sided sciatica  Evaluation Date: 8/13/2024  Authorization Period Expiration: 11/13/2024  Plan of Care Expiration: 9/27/2024  Visit # / Visits authorized: 5/13     PTA Visit #: 4/5     Time In: 1:45 pm  Time Out: 2:31 pm  Total Billable Time: 23 billable minutes (23 min non billed)    Subjective     Pt reports: Patient reports she is feeling better today  She was compliant with home exercise program.  Response to previous treatment: no complaints   Functional change: no change noted    Pain: 5/10  Location: low back      Objective      Objective Measures updated at progress report unless specified.     Treatment     Ashly received the treatments listed below:      therapeutic exercises to develop strength, endurance, ROM, flexibility, posture, and core stabilization for 8  billable minutes including:  Nu Step x 5 min  Piriformis stretch--3 x 30 sec  Figure 4 stretch--3 x 30 sec  PPT-15x    (Not Today)  LTR--10 x 10 sec    manual therapy techniques: Joint mobilizations, Manual traction, Myofacial release, Soft tissue Mobilization, and Friction Massage were applied to the:   Low back for 0 minutes, including:  -    neuromuscular re-education activities to improve: Balance, Coordination, Kinesthetic Sense, Proprioception, and Posture for 15 billable minutes. The following activities were included:  PPT with abduction and bridge--2 x 10--Silver band  PPT with march--2 x 10--Silver band  Supine ball hugs--3 x 10  SL hypers leaning on mat--2 x 10  Ball hugs--3 x  10  Standing stabs--10 x 10 sec    therapeutic activities to improve functional performance for 0 minutes, including:  -    Patient Education and Home Exercises       Education provided:   - review of home exercise program and current Plan of Care/rationale of treatment.    Written Home Exercises Provided: Patient instructed to cont prior HEP. Exercises were reviewed and Ashly was able to demonstrate them prior to the end of the session.  Ashly demonstrated good understanding of the education provided. See EMR under Patient Instructions for exercises provided during therapy sessions    Assessment     At Evaluation:  Ashly is a 43 y.o. female referred to outpatient physical therapy with a medical diagnosis of M54.42,G89.29 (ICD-10-CM) - Chronic midline low back pain with left-sided sciatica. Pt presents to PT with low back pain with left lower extremity radiculopathy, decreased lumbar flexion, range of motion, left greater than right lower extremity weakness, abnormal thoracolumbar posture, and abnormal gait mechanics. Patient may benefit from further diagnostic imaging should her symptoms not resolve with skilled physical therapy treatment.    Current Assessment:  Ashly Arrived with decreased pain in low back.   Reports compliance with initial HEP.  Still reports pain with prolonged standing.  Session focused on stabilization, stretching, and posterior hip strength without issue.  Did add a few more stabilization activities.  Less cues to properly perform PPT.  Decreased pain after session.  Cont POC.    Ashly Is progressing towards her goals.   Pt prognosis is Good.     Pt will continue to benefit from skilled outpatient physical therapy to address the deficits listed in the problem list box on initial evaluation, provide pt/family education and to maximize pt's level of independence in the home and community environment.     Pt's spiritual, cultural and educational needs considered and pt agreeable to  plan of care and goals.     Anticipated barriers to physical therapy: none    Goals:   Short Term Goals:  Patient will demonstrate independence with home exercise program to ensure carryover of treatment.  Patient will demonstrate 35 degrees of thoracolumbar flexion active range of motion to improve functional use of the thoracolumbar spine.  Patient will improve bilateral lower extremity strength to 4+/5 to improve independence and safety with bed mobility, transfers, and gait.  Patient will ambulate 150 feet without assistive device with independence to improve independence and safety with gait.  Patient will report a reduction in low back and left lower extremity pain from 10/10 to 8/10 at worst to improve quality of life.      Long Term Goals:  Patient will demonstrate 45 degrees of thoracolumbar flexion active range of motion to improve functional use of the thoracolumbar spine.  Patient will improve bilateral lower extremity strength to 5/5 to improve independence and safety with bed mobility, transfers, and gait.  Patient will ambulate 300 feet without an assistive device with independence with normal gait mechanics including up/down curbs and ramps to improve independence and safety with community ambulation.  Patient will report a reduction in low back and left lower extremity pain from 10/10 to 6/10 at worst to improve quality of life.     Plan     Plan of care Certification: 8/13/2024 to 9/27/2024.     Outpatient Physical Therapy 2 times weekly for 6 weeks to include the following interventions: Electrical Stimulation (IFC/premodulated IFC), Gait Training, Manual Therapy, Moist Heat/ Ice, Neuromuscular Re-ed, Patient Education, Therapeutic Activities, and Therapeutic Exercise.     Jg Gonsalez, PAM   08/27/2024

## 2024-08-28 ENCOUNTER — OFFICE VISIT (OUTPATIENT)
Dept: PAIN MEDICINE | Facility: CLINIC | Age: 44
End: 2024-08-28
Payer: COMMERCIAL

## 2024-08-28 VITALS
WEIGHT: 293 LBS | HEART RATE: 95 BPM | RESPIRATION RATE: 18 BRPM | SYSTOLIC BLOOD PRESSURE: 127 MMHG | DIASTOLIC BLOOD PRESSURE: 85 MMHG | HEIGHT: 66 IN | BODY MASS INDEX: 47.09 KG/M2

## 2024-08-28 DIAGNOSIS — Z79.899 ENCOUNTER FOR LONG-TERM (CURRENT) USE OF OTHER MEDICATIONS: Primary | ICD-10-CM

## 2024-08-28 DIAGNOSIS — M54.17 LUMBOSACRAL RADICULOPATHY: Chronic | ICD-10-CM

## 2024-08-28 DIAGNOSIS — G89.29 CHRONIC MIDLINE LOW BACK PAIN WITH LEFT-SIDED SCIATICA: Chronic | ICD-10-CM

## 2024-08-28 DIAGNOSIS — M54.42 CHRONIC MIDLINE LOW BACK PAIN WITH LEFT-SIDED SCIATICA: Chronic | ICD-10-CM

## 2024-08-28 LAB

## 2024-08-28 PROCEDURE — 3008F BODY MASS INDEX DOCD: CPT | Mod: CPTII,,, | Performed by: PAIN MEDICINE

## 2024-08-28 PROCEDURE — 99999 PR PBB SHADOW E&M-EST. PATIENT-LVL V: CPT | Mod: PBBFAC,,, | Performed by: PAIN MEDICINE

## 2024-08-28 PROCEDURE — 99999PBSHW POCT URINE DRUG SCREEN PRESUMP: Mod: PBBFAC,,,

## 2024-08-28 PROCEDURE — 4010F ACE/ARB THERAPY RXD/TAKEN: CPT | Mod: CPTII,,, | Performed by: PAIN MEDICINE

## 2024-08-28 PROCEDURE — 3079F DIAST BP 80-89 MM HG: CPT | Mod: CPTII,,, | Performed by: PAIN MEDICINE

## 2024-08-28 PROCEDURE — 99204 OFFICE O/P NEW MOD 45 MIN: CPT | Mod: S$PBB,,, | Performed by: PAIN MEDICINE

## 2024-08-28 PROCEDURE — 3046F HEMOGLOBIN A1C LEVEL >9.0%: CPT | Mod: CPTII,,, | Performed by: PAIN MEDICINE

## 2024-08-28 PROCEDURE — 3074F SYST BP LT 130 MM HG: CPT | Mod: CPTII,,, | Performed by: PAIN MEDICINE

## 2024-08-28 PROCEDURE — 99215 OFFICE O/P EST HI 40 MIN: CPT | Mod: PBBFAC | Performed by: PAIN MEDICINE

## 2024-08-28 PROCEDURE — 3060F POS MICROALBUMINURIA REV: CPT | Mod: CPTII,,, | Performed by: PAIN MEDICINE

## 2024-08-28 PROCEDURE — 80305 DRUG TEST PRSMV DIR OPT OBS: CPT | Mod: PBBFAC | Performed by: PAIN MEDICINE

## 2024-08-28 PROCEDURE — 1159F MED LIST DOCD IN RCRD: CPT | Mod: CPTII,,, | Performed by: PAIN MEDICINE

## 2024-08-28 PROCEDURE — 3066F NEPHROPATHY DOC TX: CPT | Mod: CPTII,,, | Performed by: PAIN MEDICINE

## 2024-08-28 RX ORDER — TIZANIDINE 4 MG/1
4 TABLET ORAL 3 TIMES DAILY
Qty: 90 TABLET | Refills: 0 | Status: SHIPPED | OUTPATIENT
Start: 2024-08-28 | End: 2024-09-27

## 2024-08-28 RX ORDER — ACETAMINOPHEN AND CODEINE PHOSPHATE 300; 30 MG/1; MG/1
1 TABLET ORAL EVERY 8 HOURS PRN
Qty: 45 TABLET | Refills: 0 | Status: SHIPPED | OUTPATIENT
Start: 2024-08-28 | End: 2024-09-27

## 2024-08-28 NOTE — PROGRESS NOTES
Chronic Pain - New Consult    Referring Physician: Rosaura Manzanares FNP       SUBJECTIVE: Disclaimer: This note has been generated using voice-recognition software. There may be typographical errors that have been missed during proof-reading      Initial encounter:    Ashly Trent presents to the clinic for the evaluation of lower back and left lower extremity pain.       43-year-old female presents for new patient evaluation and consultation from JESI Le.  Patient reports an onset of lower back pain 1 month ago.  She denies precipitating events but her pain has not changed or worsened since onset.  The pain radiates from the  lower back to the bilateral lower extremities, left than right.  She denies paresthesia, weakness, bowel or bladder involvement.  She denies any prior history of lower back pain.  Her pain is often exacerbated with  standing and improves with sitting.  She has not been involved in physical therapy.  Ibuprofen has provided some relief.  X-ray of the lumbar spine revealed moderate loss of disc space height at L5-S1 and osteophyte formation.      Pain Assessment  Pain Assessment: 0-10  Pain Score:   7  Pain Location: Back  Pain Orientation: Left  Pain Radiating Towards: side  Pain Descriptors: Sharp  Pain Frequency: Constant/continuous  Pain Onset: Awakened from sleep  Aggravating Factors: Standing  Pain Intervention(s): Medication (See eMAR), Home medication      Physical Therapy/Home Exercise:  Patient has completed 3-4 sessions of physical therapy without improvement      Pain Medications:  has a current medication list which includes the following prescription(s): amlodipine, atorvastatin, bd ultra-fine colleen pen needle, halobetasol, ibuprofen, losartan, norethindrone, pen needle, diabetic, pen needle, diabetic, ozempic, acetaminophen-codeine 300-30mg, gabapentin, and tizanidine.      Tried in Past:  NSAIDS-yes  TCA-no  SNRI-no  Anti-convulsants-yes  Muscle  "Relaxants-no  Opioids-no  Benzodiazepines-no     4A"s of Opioid Risk Assessment  Activity: Patient has difficulty performing  ADL  Analgesia:  Patient's pain is partially controlled by current medication.   Aberrant Behavior:  reviewed with no aberrant drug seeking/taking behavior     report:  Reviewed and consistent with medication use as prescribed.    Patient denies suicidal or homicidal ideations    Pain interventional therapy-no    Chiropractor -no  Acupuncture - no  TENS unit -no  Massage therapy-no  Spinal decompression -no  Joint replacement -no       Review of Systems   Constitutional: Negative.    HENT: Negative.     Eyes: Negative.    Respiratory: Negative.     Cardiovascular: Negative.    Gastrointestinal: Negative.    Endocrine: Negative.    Genitourinary: Negative.    Musculoskeletal:  Positive for arthralgias, back pain and leg pain (Left lower extremity).   Integumentary:  Negative.   Neurological:  Positive for numbness (Left lower Extremity).   Hematological: Negative.    Psychiatric/Behavioral: Negative.               X-Ray Lumbar Spine AP And Lateral  Narrative: EXAMINATION:  XR LUMBAR SPINE AP AND LATERAL    CLINICAL HISTORY:  Lumbago with sciatica, left side    COMPARISON:  Lumbar spine x-ray August 15, 2022    TECHNIQUE:  Frontal and lateral views of the lumbar spine.    FINDINGS:  Accentuation of normal lumbar lordosis.  Moderate loss of disc space height at L5-S1.  Mild marginal vertebral body osteophyte formation noted at L5-S1.  Lumbar vertebral body heights appear maintained.  Surgical clips project over the right upper quadrant of the abdomen.  Impression: As above.    Point of Service: [facility]    Electronically signed by: Sridhar Dove  Date:    08/02/2024  Time:    08:54         Past Medical History:   Diagnosis Date    Acid reflux     Diabetes mellitus, type 2     HTN (hypertension)     Hyperlipidemia     Migraine with aura     Obesity      Past Surgical History: "   Procedure Laterality Date    CHOLECYSTECTOMY      TONSILLECTOMY       Social History     Socioeconomic History    Marital status: Single   Tobacco Use    Smoking status: Never    Smokeless tobacco: Never   Substance and Sexual Activity    Alcohol use: Yes    Drug use: Not Currently    Sexual activity: Yes     Social Determinants of Health     Financial Resource Strain: Low Risk  (8/9/2024)    Received from [facility]    Overall Financial Resource Strain (CARDIA)     Difficulty of Paying Living Expenses: Not very hard   Food Insecurity: No Food Insecurity (8/9/2024)    Received from [facility]    Hunger Vital Sign     Worried About Running Out of Food in the Last Year: Never true     Ran Out of Food in the Last Year: Never true   Transportation Needs: No Transportation Needs (8/9/2024)    Received from [facility]    PRAPARE - Transportation     Lack of Transportation (Medical): No     Lack of Transportation (Non-Medical): No   Physical Activity: Insufficiently Active (8/9/2024)    Received from [facility]    Exercise Vital Sign     Days of Exercise per Week: 3 days     Minutes of Exercise per Session: 10 min   Stress: No Stress Concern Present (8/9/2024)    Received from [facility]    [employer] - Occupational Stress Questionnaire     Feeling of Stress : Not at all   Housing Stability: Low Risk  (8/9/2024)    Received from [facility]    Housing Stability Vital Sign     Unable to Pay for Housing in the Last Year: No     Number of Times Moved in the Last Year: 1     Homeless in the Last Year: No     Family History   Problem Relation Name Age of Onset    Asthma Mother      Diabetes Mother      Hypertension Mother      Hypertension Father      Diabetes Paternal Grandmother      Heart disease Paternal Grandfather   "    Hypertension Sister      No Known Problems Brother      No Known Problems Brother       Review of patient's allergies indicates:   Allergen Reactions    Amoxicillin Rash         OBJECTIVE:  Vitals:    08/28/24 1511   BP: 127/85   Pulse: 95   Resp: 18     /85   Pulse 95   Resp 18   Ht 5' 6" (1.676 m)   Wt 133.8 kg (295 lb)   LMP 08/17/2024   BMI 47.61 kg/m²   Physical Exam  Vitals and nursing note reviewed.   Constitutional:       General: She is not in acute distress.     Appearance: Normal appearance. She is obese. She is not ill-appearing, toxic-appearing or diaphoretic.   HENT:      Head: Normocephalic and atraumatic.      Nose: Nose normal.      Mouth/Throat:      Mouth: Mucous membranes are moist.   Eyes:      Extraocular Movements: Extraocular movements intact.      Pupils: Pupils are equal, round, and reactive to light.   Cardiovascular:      Rate and Rhythm: Normal rate and regular rhythm.      Heart sounds: Normal heart sounds.   Pulmonary:      Effort: Pulmonary effort is normal. No respiratory distress.      Breath sounds: Normal breath sounds. No stridor. No wheezing or rhonchi.   Abdominal:      General: Bowel sounds are normal.      Palpations: Abdomen is soft.   Musculoskeletal:         General: No swelling or deformity.      Cervical back: Normal and normal range of motion. No spasms or tenderness. No pain with movement. Normal range of motion.      Thoracic back: Normal.      Lumbar back: Tenderness and bony tenderness present. No spasms. Decreased range of motion. Positive left straight leg raise test. Negative right straight leg raise test. No scoliosis.      Right lower leg: No edema.      Left lower leg: No edema.      Comments: Lumbar pain with flexion, extension lateral rotation.   Skin:     General: Skin is warm.   Neurological:      General: No focal deficit present.      Mental Status: She is alert and oriented to person, place, and time. Mental status is at baseline.      " Cranial Nerves: No cranial nerve deficit.      Sensory: Sensation is intact. No sensory deficit.      Motor: No weakness.      Coordination: Coordination normal.      Gait: Gait normal.      Deep Tendon Reflexes: Reflexes are normal and symmetric.      Reflex Scores:       Patellar reflexes are 2+ on the right side and 2+ on the left side.       Achilles reflexes are 2+ on the right side and 2+ on the left side.  Psychiatric:         Mood and Affect: Mood normal.         Behavior: Behavior normal.            ASSESSMENT: 43 y.o. year old female with pain, consistent with     Encounter Diagnoses   Name Primary?    Chronic midline low back pain with left-sided sciatica     Encounter for long-term (current) use of other medications Yes    Lumbosacral radiculopathy         PLAN:   1. reviewed  2..Addiction, Dependency, Tolerance, Opioid abuse-misuse, Death, Diversion Discussed. Overdose reversal drug Naloxone discussed.Patient is prescribed opiates for chronic nonmalignant pain pathology.  Patient is receiving opiates which require greater than a 72 hour supply of therapy.  Patient was educated on potential dependency associated with long-term opioid use as well as decreasing efficacy with prolonged use.  Patient was advised of risks, benefits and side effects and how to utilize each medication.  Patient was also informed that any deviation from therapy protocol will  lead to discontinuation of opiates.  It is reasonable to prescribe opioid analgesics for patient based on positive response to opioid medications, lack of side effects and  limited aberrant behavior.    3. Opioid contract signed today  4.Refill/ Continue medications for pain control and function.  Start Tylenol No. 3 and Zanaflex for pain and muscle spasticity       Requested Prescriptions     Signed Prescriptions Disp Refills    tiZANidine (ZANAFLEX) 4 MG tablet 90 tablet 0     Sig: Take 1 tablet (4 mg total) by mouth 3 (three) times daily... May cause  drowsiness    acetaminophen-codeine 300-30mg (TYLENOL #3) 300-30 mg Tab 45 tablet 0     Sig: Take 1 tablet by mouth every 8 (eight) hours as needed for pain.. May cause drowsiness     5. Obtain vitamin-D level for deficiency  6.Urine drug screen and confirmation testing was ordered as documented on the requisition form in order to monitor for compliance with prescribed opiates and to ensure that there was no misuse/abuse of other non-prescribed opiates or illicit drugs.  I will determine if the patient is suitable for continuation of opioid therapy after obtaining  the definitive urine drug screen for confirmation.    Orders Placed This Encounter   Procedures    Drug Screen Definitive 14, Urine     Standing Status:   Future     Number of Occurrences:   1     Standing Expiration Date:   10/27/2025     Order Specific Question:   Specimen Source     Answer:   Urine    Vitamin D     Standing Status:   Future     Standing Expiration Date:   11/26/2025    [facility]     Interpretive Information:     Negative:  No drug detected at the cut off level.   Positive:  This result represents presumptive positive for the   tested drug, other substances may yield a positive response other   than the analyte of interest. This result should be utilized for   diagnostic purpose only. Confirmation testing will be performed upon physician request only.         7. Return in 1 month for re-evaluation treatment options  8. Consider MRI of the lumbar spine after completion of physical therapy and if indicated    The total time spent for evaluation and management on 08/28/2024 including reviewing separately obtained history, performing a medically appropriate exam and evaluation, documenting clinical information in the health record, independently interpreting results and communicating them to the patient/family/caregiver, and ordering medications/tests/procedures was between 15-29 minutes.    The above plan and  management options were discussed at length with patient. Patient is in agreement with the above and verbalized understanding. It will be communicated with the referring physician via electronic record, fax, or mail.    Dalia Day  08/28/2024

## 2024-09-03 ENCOUNTER — CLINICAL SUPPORT (OUTPATIENT)
Dept: REHABILITATION | Facility: HOSPITAL | Age: 44
End: 2024-09-03
Payer: COMMERCIAL

## 2024-09-03 ENCOUNTER — CLINICAL SUPPORT (OUTPATIENT)
Dept: DERMATOLOGY | Facility: CLINIC | Age: 44
End: 2024-09-03
Payer: COMMERCIAL

## 2024-09-03 ENCOUNTER — PATIENT MESSAGE (OUTPATIENT)
Dept: PAIN MEDICINE | Facility: CLINIC | Age: 44
End: 2024-09-03
Payer: COMMERCIAL

## 2024-09-03 DIAGNOSIS — L27.0 DRUG ERUPTION: Primary | ICD-10-CM

## 2024-09-03 DIAGNOSIS — M54.42 CHRONIC MIDLINE LOW BACK PAIN WITH LEFT-SIDED SCIATICA: Primary | ICD-10-CM

## 2024-09-03 DIAGNOSIS — G89.29 CHRONIC MIDLINE LOW BACK PAIN WITH LEFT-SIDED SCIATICA: Primary | ICD-10-CM

## 2024-09-03 PROCEDURE — 97110 THERAPEUTIC EXERCISES: CPT

## 2024-09-03 PROCEDURE — 99213 OFFICE O/P EST LOW 20 MIN: CPT | Mod: ,,, | Performed by: STUDENT IN AN ORGANIZED HEALTH CARE EDUCATION/TRAINING PROGRAM

## 2024-09-03 PROCEDURE — 97112 NEUROMUSCULAR REEDUCATION: CPT

## 2024-09-03 RX ORDER — ERGOCALCIFEROL 1.25 MG/1
50000 CAPSULE ORAL
Qty: 8 CAPSULE | Refills: 0 | Status: SHIPPED | OUTPATIENT
Start: 2024-09-03 | End: 2024-10-29

## 2024-09-03 NOTE — PROGRESS NOTES
[facility]  Norbert Sy MD    [address] [place] [address]  [phone]    Fax: [phone]    Patient Name: Ashly Trent  Medical Record Number: [id number]  PCP: Rosaura Manzanares FNP  Age: 43 y.o. : 1980  Contact: There are no phone numbers on file.    History of Present Illness:     Ashly Trent is a 43 y.o.  female here for follow up of rash. Biopsy revealed perivascular dermatitis with spongiosis. I suspect drug eruption. The rash has improved with halobetasol and drug holiday of Jardiance.     The patient has no other concerns today.    Review of Systems:     Unremarkable other than mentioned above.     Physical Exam:     General: Relaxed, oriented, alert    Skin examination of the scalp, face, neck, chest, back, abdomen, upper extremities and lower extremities were normal except for as listed below      Assessment and Plan:     1. Drug Eruption secondary to Jardiance   - resolved       Plan:  - clobetasol PRN   - call if it recurs   - is doing well after switching to Ozempic     Norbert Sy MD   Mohs Surgery/Dermatologic Oncology  Dermatology

## 2024-09-03 NOTE — PROGRESS NOTES
"[facility]   Physical Therapy Treatment Note      Name: Ashly Trent  Clinic Number: [id number]    Therapy Diagnosis:   Encounter Diagnosis   Name Primary?    Chronic midline low back pain with left-sided sciatica Yes     Physician: Rosaura Manzanares FNP    Visit Date: 9/3/2024    Physician Orders: PT Eval and Treat  Medical Diagnosis from Referral: M54.42,G89.29 (ICD-10-CM) - Chronic midline low back pain with left-sided sciatica  Evaluation Date: 8/13/2024  Authorization Period Expiration: 11/13/2024  Plan of Care Expiration: 9/27/2024  Visit # / Visits authorized: 6/13     PTA Visit #: 0/5     Time In: 1:32 pm  Time Out: 2:15 pm  Total Billable Time: 28 billable minutes (concurrent treatment)    Subjective     Pt reports: "I am hurting just a little bit."    She was compliant with home exercise program.  Response to previous treatment: no complaints   Functional change: no change noted    Pain: 6/10  Location: low back      Objective      Objective Measures updated at progress report unless specified.     Treatment     Ashly received the treatments listed below:      therapeutic exercises to develop strength, endurance, ROM, flexibility, posture, and core stabilization for 11 billable minutes including:    NuStep: x 5 minutes  Supine piriformis stretch: 3 x 20 second holds  Seated figure 4 stretch: 3 x 20 second holds    Lower trunk rotation: x 10 reps with 10 second holds (not performed)     manual therapy techniques: Joint mobilizations, Manual traction, Myofacial release, Soft tissue Mobilization, and Friction Massage were applied to the low back for 0 minutes, including:    (not performed)     neuromuscular re-education activities to improve: Balance, Coordination, Kinesthetic Sense, Proprioception, and Posture for 17 billable minutes. The following activities were included:    Supine posterior pelvic tilts: x 20 rep with 3 second holds  Supine ball hugs: x 20 reps with " "3 second holds  Bridges: x 20 reps  Supine posterior pelvic tilts with hip abduction/adduction: silver theraband; x 20 reps   Supine marching: silver theraband; x 20 reps   Standing core stabilizations with marching in squat rack with black tubing: x 20 reps each  Cybex lumbar extensions: 3 plates; x 20 reps     SL hypers leaning on mat--2 x 10 (not performed)     therapeutic activities to improve functional performance for 0 minutes, including:    (not performed)     Patient Education and Home Exercises       Education provided: Patient educated on the importance of completing skilled physical therapy treatment and home exercise program as prescribed to maximize functional gains.     Written Home Exercises Provided: Patient instructed to cont prior HEP. Exercises were reviewed and Ashly was able to demonstrate them prior to the end of the session. Ashly demonstrated good understanding of the education provided. See EMR under Patient Instructions for exercises provided during therapy sessions    Assessment     At Evaluation:  Ashly is a 43 y.o. female referred to outpatient physical therapy with a medical diagnosis of M54.42,G89.29 (ICD-10-CM) - Chronic midline low back pain with left-sided sciatica. Pt presents to PT with low back pain with left lower extremity radiculopathy, decreased lumbar flexion, range of motion, left greater than right lower extremity weakness, abnormal thoracolumbar posture, and abnormal gait mechanics. Patient may benefit from further diagnostic imaging should her symptoms not resolve with skilled physical therapy treatment.    Current Assessment:  Ashly arrived with reports of "a little bit" of back pain this afternoon rating it a 6/10. Patient with good effort throughout treatment but with noted fatigue. Patient with good tolerance of the addition of Cybex lumbar extensions this visit. Physical Therapist will continue to progress therapeutic exercise, neuromuscular re-education, " therapeutic activities, and gait training as able with manual therapy and modalities utilized as needed.     Ashly Is progressing towards her goals.   Pt prognosis is Good.     Pt will continue to benefit from skilled outpatient physical therapy to address the deficits listed in the problem list box on initial evaluation, provide pt/family education and to maximize pt's level of independence in the home and community environment.     Pt's spiritual, cultural and educational needs considered and pt agreeable to plan of care and goals.     Anticipated barriers to physical therapy: none    Goals:   Short Term Goals:  Patient will demonstrate independence with home exercise program to ensure carryover of treatment.  Patient will demonstrate 35 degrees of thoracolumbar flexion active range of motion to improve functional use of the thoracolumbar spine.  Patient will improve bilateral lower extremity strength to 4+/5 to improve independence and safety with bed mobility, transfers, and gait.  Patient will ambulate 150 feet without assistive device with independence to improve independence and safety with gait.  Patient will report a reduction in low back and left lower extremity pain from 10/10 to 8/10 at worst to improve quality of life.      Long Term Goals:  Patient will demonstrate 45 degrees of thoracolumbar flexion active range of motion to improve functional use of the thoracolumbar spine.  Patient will improve bilateral lower extremity strength to 5/5 to improve independence and safety with bed mobility, transfers, and gait.  Patient will ambulate 300 feet without an assistive device with independence with normal gait mechanics including up/down curbs and ramps to improve independence and safety with community ambulation.  Patient will report a reduction in low back and left lower extremity pain from 10/10 to 6/10 at worst to improve quality of life.     Plan     Plan of care Certification: 8/13/2024 to  9/27/2024.     Outpatient Physical Therapy 2 times weekly for 6 weeks to include the following interventions: Electrical Stimulation (IFC/premodulated IFC), Gait Training, Manual Therapy, Moist Heat/ Ice, Neuromuscular Re-ed, Patient Education, Therapeutic Activities, and Therapeutic Exercise.     Supervisory visit/case conference performed with ARLET Moses and ARLET Brody to discuss evaluation findings, goals, and plan of care.       VIDA MAHAN, PT, DPT  09/03/2024

## 2024-09-05 ENCOUNTER — CLINICAL SUPPORT (OUTPATIENT)
Dept: REHABILITATION | Facility: HOSPITAL | Age: 44
End: 2024-09-05
Payer: COMMERCIAL

## 2024-09-05 DIAGNOSIS — M54.42 CHRONIC MIDLINE LOW BACK PAIN WITH LEFT-SIDED SCIATICA: Primary | ICD-10-CM

## 2024-09-05 DIAGNOSIS — G89.29 CHRONIC MIDLINE LOW BACK PAIN WITH LEFT-SIDED SCIATICA: Primary | ICD-10-CM

## 2024-09-05 PROCEDURE — 97110 THERAPEUTIC EXERCISES: CPT | Mod: CQ

## 2024-09-05 PROCEDURE — 97112 NEUROMUSCULAR REEDUCATION: CPT | Mod: CQ

## 2024-09-05 NOTE — PROGRESS NOTES
[facility]   Physical Therapy Treatment Note      Name: Ashly Trent  Clinic Number: [id number]    Therapy Diagnosis:   Encounter Diagnosis   Name Primary?    Chronic midline low back pain with left-sided sciatica Yes     Physician: Rosaura Manzanares FNP    Visit Date: 9/5/2024    Physician Orders: PT Eval and Treat  Medical Diagnosis from Referral: M54.42,G89.29 (ICD-10-CM) - Chronic midline low back pain with left-sided sciatica  Evaluation Date: 8/13/2024  Authorization Period Expiration: 11/13/2024  Plan of Care Expiration: 9/27/2024  Visit # / Visits authorized: 7/13     PTA Visit #: 1/5     Time In: 1:46 pm  Time Out: 2:31 pm  Total Billable Time: 23 billable minutes (22 min non billed)    Subjective     Pt reports: she is feeling better    She was compliant with home exercise program.  Response to previous treatment: no complaints   Functional change: no change noted    Pain: 3/10  Location: low back      Objective      Objective Measures updated at progress report unless specified.     Treatment     Ashly received the treatments listed below:      therapeutic exercises to develop strength, endurance, ROM, flexibility, posture, and core stabilization for 11 billable minutes including:    NuStep: x 5 minutes  Supine piriformis stretch: 3 x 20 second holds  Seated figure 4 stretch: 3 x 20 second holds  Lower trunk rotation: x 10 reps with 10 second holds (not performed)     manual therapy techniques: Joint mobilizations, Manual traction, Myofacial release, Soft tissue Mobilization, and Friction Massage were applied to the low back for 0 minutes, including:    (not performed)     neuromuscular re-education activities to improve: Balance, Coordination, Kinesthetic Sense, Proprioception, and Posture for 12 billable minutes. The following activities were included:  Supine posterior pelvic tilts: x 20 rep with 3 second holds  Supine ball hugs: x 20 reps with 3 second holds  PPT  with Bridges with abduction--silver band-- : x 20 reps  PPT with  marching: silver theraband; x 20 reps   Standing core stabilizations with marching in squat rack with black tubing: x 20 reps each  Cybex lumbar extensions: 3 plates; x 20 reps   Paloff Press--blue cord--2 x 10 each way  SL hypers leaning on mat--2 x 10    therapeutic activities to improve functional performance for 0 minutes, including:  -     Patient Education and Home Exercises       Education provided: Patient educated on the importance of completing skilled physical therapy treatment and home exercise program as prescribed to maximize functional gains.     Written Home Exercises Provided: Patient instructed to cont prior HEP. Exercises were reviewed and Ashly was able to demonstrate them prior to the end of the session. Ashly demonstrated good understanding of the education provided. See EMR under Patient Instructions for exercises provided during therapy sessions    Assessment     At Evaluation:  Ashly is a 43 y.o. female referred to outpatient physical therapy with a medical diagnosis of M54.42,G89.29 (ICD-10-CM) - Chronic midline low back pain with left-sided sciatica. Pt presents to PT with low back pain with left lower extremity radiculopathy, decreased lumbar flexion, range of motion, left greater than right lower extremity weakness, abnormal thoracolumbar posture, and abnormal gait mechanics. Patient may benefit from further diagnostic imaging should her symptoms not resolve with skilled physical therapy treatment.    Current Assessment:  Ashly arrived with reports of less pain than last session.  Patient with good effort throughout treatment but with noted fatigue. Patient with good tolerance of the addition of paloff press.  Want to add cybex hip ext next visit to progress from leaning hip ext.  Physical Therapist will continue to progress therapeutic exercise, neuromuscular re-education, therapeutic activities, and gait training  as able with manual therapy and modalities utilized as needed.     Ashly Is progressing towards her goals.   Pt prognosis is Good.     Pt will continue to benefit from skilled outpatient physical therapy to address the deficits listed in the problem list box on initial evaluation, provide pt/family education and to maximize pt's level of independence in the home and community environment.     Pt's spiritual, cultural and educational needs considered and pt agreeable to plan of care and goals.     Anticipated barriers to physical therapy: none    Goals:   Short Term Goals:  Patient will demonstrate independence with home exercise program to ensure carryover of treatment.  Patient will demonstrate 35 degrees of thoracolumbar flexion active range of motion to improve functional use of the thoracolumbar spine.  Patient will improve bilateral lower extremity strength to 4+/5 to improve independence and safety with bed mobility, transfers, and gait.  Patient will ambulate 150 feet without assistive device with independence to improve independence and safety with gait.  Patient will report a reduction in low back and left lower extremity pain from 10/10 to 8/10 at worst to improve quality of life.      Long Term Goals:  Patient will demonstrate 45 degrees of thoracolumbar flexion active range of motion to improve functional use of the thoracolumbar spine.  Patient will improve bilateral lower extremity strength to 5/5 to improve independence and safety with bed mobility, transfers, and gait.  Patient will ambulate 300 feet without an assistive device with independence with normal gait mechanics including up/down curbs and ramps to improve independence and safety with community ambulation.  Patient will report a reduction in low back and left lower extremity pain from 10/10 to 6/10 at worst to improve quality of life.     Plan     Plan of care Certification: 8/13/2024 to 9/27/2024.     Outpatient Physical Therapy 2 times  weekly for 6 weeks to include the following interventions: Electrical Stimulation (IFC/premodulated IFC), Gait Training, Manual Therapy, Moist Heat/ Ice, Neuromuscular Re-ed, Patient Education, Therapeutic Activities, and Therapeutic Exercise.     Supervisory visit/case conference performed with ARLET Moses and ARLET Brody to discuss evaluation findings, goals, and plan of care.       Jg Gonsalez PTA, DPT  09/05/2024

## 2024-09-10 ENCOUNTER — CLINICAL SUPPORT (OUTPATIENT)
Dept: REHABILITATION | Facility: HOSPITAL | Age: 44
End: 2024-09-10
Payer: COMMERCIAL

## 2024-09-10 DIAGNOSIS — G89.29 CHRONIC MIDLINE LOW BACK PAIN WITH LEFT-SIDED SCIATICA: Primary | ICD-10-CM

## 2024-09-10 DIAGNOSIS — M54.42 CHRONIC MIDLINE LOW BACK PAIN WITH LEFT-SIDED SCIATICA: Primary | ICD-10-CM

## 2024-09-10 PROCEDURE — 97110 THERAPEUTIC EXERCISES: CPT

## 2024-09-10 PROCEDURE — 97112 NEUROMUSCULAR REEDUCATION: CPT

## 2024-09-10 NOTE — PROGRESS NOTES
[facility]   Physical Therapy Treatment Note      Name: Ashly Trent  Clinic Number: [id number]    Therapy Diagnosis:   Encounter Diagnosis   Name Primary?    Chronic midline low back pain with left-sided sciatica Yes     Physician: Rosaura Manzanares FNP    Visit Date: 9/10/2024    Physician Orders: PT Eval and Treat  Medical Diagnosis from Referral: M54.42,G89.29 (ICD-10-CM) - Chronic midline low back pain with left-sided sciatica  Evaluation Date: 8/13/2024  Authorization Period Expiration: 11/13/2024  Plan of Care Expiration: 9/27/2024  Visit # / Visits authorized: 8/13     PTA Visit #: 0/5     Time In: 1:30 pm  Time Out: 2:20 pm  Total Billable Time: 27 billable minutes (concurrent treatment)    Subjective     Pt reports: Her low back is feeling better today than it was yesterday. Patient reports she was unable to go to work yesterday due to her pain.     She was compliant with home exercise program.  Response to previous treatment: no complaints   Functional change: no change noted    Pain: 5/10  Location: low back      Objective      Objective Measures updated at progress report unless specified.     Treatment     Ashly received the treatments listed below:      therapeutic exercises to develop strength, endurance, ROM, flexibility, posture, and core stabilization for 20 billable minutes including:    NuStep: x 5 minutes  Supine piriformis stretch: 3 x 20 second holds  Seated figure 4 stretch: 3 x 20 second holds  Lower trunk rotation: x 5 reps with 10 second holds each  Leg press (bilateral): 5 plates; x 20 reps    manual therapy techniques: Joint mobilizations, Manual traction, Myofacial release, Soft tissue Mobilization, and Friction Massage were applied to the low back for 0 minutes, including:    (not performed)     neuromuscular re-education activities to improve: Balance, Coordination, Kinesthetic Sense, Proprioception, and Posture for 30 billable minutes. The  following activities were included:    Supine posterior pelvic tilts: x 20 rep with 3 second holds  Supine posterior pelvic tilts with bridges with abduction: silver theraband: x 20 reps  Supine posterior pelvic tilts with alternating marching: silver theraband; x 20 reps  Standing core stabilizations with alternating marching in squat rack with black tubing: x 20 reps each  Cybex lumbar extensions: 3 plates; x 20 reps   Paloff press: blue theraband; 2 x 10 each direction    SL hypers leaning on mat--2 x 10 (not performed)     Supine ball hugs: x 20 reps with 3 second holds (not performed)     therapeutic activities to improve functional performance for 0 minutes, including:    (not performed)     Patient Education and Home Exercises       Education provided: Patient educated on the importance of completing skilled physical therapy treatment and home exercise program as prescribed to maximize functional gains.     Written Home Exercises Provided: Patient instructed to cont prior HEP. Exercises were reviewed and Ashly was able to demonstrate them prior to the end of the session. Ashly demonstrated good understanding of the education provided. See EMR under Patient Instructions for exercises provided during therapy sessions    Assessment     At Evaluation:  Ashly is a 43 y.o. female referred to outpatient physical therapy with a medical diagnosis of M54.42,G89.29 (ICD-10-CM) - Chronic midline low back pain with left-sided sciatica. Pt presents to PT with low back pain with left lower extremity radiculopathy, decreased lumbar flexion, range of motion, left greater than right lower extremity weakness, abnormal thoracolumbar posture, and abnormal gait mechanics. Patient may benefit from further diagnostic imaging should her symptoms not resolve with skilled physical therapy treatment.    Current Assessment:  Ashly arrived with reports of less pain than yesterday. Patient with good effort throughout treatment but  with noted fatigue. Patient with good tolerance of the addition of leg press but with fatigue noted. Plan to add Cybex hip extension next visit. Physical Therapist will continue to progress therapeutic exercise, neuromuscular re-education, therapeutic activities, and gait training as able with manual therapy and modalities utilized as needed.     Ashly Is progressing towards her goals.   Pt prognosis is Good.     Pt will continue to benefit from skilled outpatient physical therapy to address the deficits listed in the problem list box on initial evaluation, provide pt/family education and to maximize pt's level of independence in the home and community environment.     Pt's spiritual, cultural and educational needs considered and pt agreeable to plan of care and goals.     Anticipated barriers to physical therapy: none    Goals:   Short Term Goals:  Patient will demonstrate independence with home exercise program to ensure carryover of treatment.  Patient will demonstrate 35 degrees of thoracolumbar flexion active range of motion to improve functional use of the thoracolumbar spine.  Patient will improve bilateral lower extremity strength to 4+/5 to improve independence and safety with bed mobility, transfers, and gait.  Patient will ambulate 150 feet without assistive device with independence to improve independence and safety with gait.  Patient will report a reduction in low back and left lower extremity pain from 10/10 to 8/10 at worst to improve quality of life.      Long Term Goals:  Patient will demonstrate 45 degrees of thoracolumbar flexion active range of motion to improve functional use of the thoracolumbar spine.  Patient will improve bilateral lower extremity strength to 5/5 to improve independence and safety with bed mobility, transfers, and gait.  Patient will ambulate 300 feet without an assistive device with independence with normal gait mechanics including up/down curbs and ramps to improve  independence and safety with community ambulation.  Patient will report a reduction in low back and left lower extremity pain from 10/10 to 6/10 at worst to improve quality of life.     Plan     Plan of care Certification: 8/13/2024 to 9/27/2024.     Outpatient Physical Therapy 2 times weekly for 6 weeks to include the following interventions: Electrical Stimulation (IFC/premodulated IFC), Gait Training, Manual Therapy, Moist Heat/ Ice, Neuromuscular Re-ed, Patient Education, Therapeutic Activities, and Therapeutic Exercise.     Supervisory visit/case conference performed with ARLET Moses and ARLET Brody to discuss evaluation findings, goals, and plan of care.       VIDA MAHAN, PT, DPT  09/10/2024

## 2024-09-12 ENCOUNTER — HOSPITAL ENCOUNTER (EMERGENCY)
Facility: HOSPITAL | Age: 44
Discharge: HOME | End: 2024-09-12
Payer: COMMERCIAL

## 2024-09-12 VITALS
OXYGEN SATURATION: 97 % | SYSTOLIC BLOOD PRESSURE: 120 MMHG | WEIGHT: 293 LBS | DIASTOLIC BLOOD PRESSURE: 74 MMHG | TEMPERATURE: 99 F | HEART RATE: 88 BPM | RESPIRATION RATE: 16 BRPM | HEIGHT: 66 IN | BODY MASS INDEX: 47.09 KG/M2

## 2024-09-12 DIAGNOSIS — R07.89 CHEST WALL PAIN: Primary | ICD-10-CM

## 2024-09-12 DIAGNOSIS — R07.9 CHEST PAIN: ICD-10-CM

## 2024-09-12 LAB
ANION GAP SERPL CALCULATED.3IONS-SCNC: 13 MMOL/L (ref 7–16)
APTT PPP: 25.2 SECONDS (ref 25.2–37.3)
BASOPHILS # BLD AUTO: 0.05 K/UL (ref 0–0.2)
BASOPHILS NFR BLD AUTO: 0.6 % (ref 0–1)
BUN SERPL-MCNC: 11 MG/DL (ref 7–18)
BUN/CREAT SERPL: 9 (ref 6–20)
CALCIUM SERPL-MCNC: 9.6 MG/DL (ref 8.5–10.1)
CHLORIDE SERPL-SCNC: 102 MMOL/L (ref 98–107)
CO2 SERPL-SCNC: 25 MMOL/L (ref 21–32)
CREAT SERPL-MCNC: 1.17 MG/DL (ref 0.55–1.02)
D DIMER PPP FEU-MCNC: 0.36 ΜG/ML (ref 0–0.47)
DIFFERENTIAL METHOD BLD: ABNORMAL
EGFR (NO RACE VARIABLE) (RUSH/TITUS): 60 ML/MIN/1.73M2
EOSINOPHIL # BLD AUTO: 0.27 K/UL (ref 0–0.5)
EOSINOPHIL NFR BLD AUTO: 3.4 % (ref 1–4)
ERYTHROCYTE [DISTWIDTH] IN BLOOD BY AUTOMATED COUNT: 12.6 % (ref 11.5–14.5)
GLUCOSE SERPL-MCNC: 306 MG/DL (ref 74–106)
HCT VFR BLD AUTO: 44.2 % (ref 38–47)
HGB BLD-MCNC: 14.3 G/DL (ref 12–16)
IMM GRANULOCYTES # BLD AUTO: 0.03 K/UL (ref 0–0.04)
IMM GRANULOCYTES NFR BLD: 0.4 % (ref 0–0.4)
INR BLD: 1.04
LYMPHOCYTES # BLD AUTO: 3.03 K/UL (ref 1–4.8)
LYMPHOCYTES NFR BLD AUTO: 38.1 % (ref 27–41)
MAGNESIUM SERPL-MCNC: 2 MG/DL (ref 1.7–2.3)
MCH RBC QN AUTO: 29 PG (ref 27–31)
MCHC RBC AUTO-ENTMCNC: 32.4 G/DL (ref 32–36)
MCV RBC AUTO: 89.7 FL (ref 80–96)
MONOCYTES # BLD AUTO: 0.51 K/UL (ref 0–0.8)
MONOCYTES NFR BLD AUTO: 6.4 % (ref 2–6)
MPC BLD CALC-MCNC: 10.4 FL (ref 9.4–12.4)
NEUTROPHILS # BLD AUTO: 4.07 K/UL (ref 1.8–7.7)
NEUTROPHILS NFR BLD AUTO: 51.1 % (ref 53–65)
NRBC # BLD AUTO: 0 X10E3/UL
NRBC, AUTO (.00): 0 %
PLATELET # BLD AUTO: 287 K/UL (ref 150–400)
POTASSIUM SERPL-SCNC: 3.8 MMOL/L (ref 3.5–5.1)
PROTHROMBIN TIME: 13.5 SECONDS (ref 11.7–14.7)
RBC # BLD AUTO: 4.93 M/UL (ref 4.2–5.4)
SODIUM SERPL-SCNC: 136 MMOL/L (ref 136–145)
TROPONIN I SERPL DL<=0.01 NG/ML-MCNC: 4 PG/ML
TROPONIN I SERPL DL<=0.01 NG/ML-MCNC: <4 PG/ML
WBC # BLD AUTO: 7.96 K/UL (ref 4.5–11)

## 2024-09-12 PROCEDURE — 85730 THROMBOPLASTIN TIME PARTIAL: CPT | Performed by: NURSE PRACTITIONER

## 2024-09-12 PROCEDURE — 63600175 PHARM REV CODE 636 W HCPCS: Performed by: NURSE PRACTITIONER

## 2024-09-12 PROCEDURE — 85610 PROTHROMBIN TIME: CPT | Performed by: NURSE PRACTITIONER

## 2024-09-12 PROCEDURE — 84484 ASSAY OF TROPONIN QUANT: CPT | Performed by: NURSE PRACTITIONER

## 2024-09-12 PROCEDURE — 96374 THER/PROPH/DIAG INJ IV PUSH: CPT

## 2024-09-12 PROCEDURE — 83735 ASSAY OF MAGNESIUM: CPT | Performed by: NURSE PRACTITIONER

## 2024-09-12 PROCEDURE — 99285 EMERGENCY DEPT VISIT HI MDM: CPT | Mod: 25

## 2024-09-12 PROCEDURE — 93005 ELECTROCARDIOGRAM TRACING: CPT

## 2024-09-12 PROCEDURE — 93010 ELECTROCARDIOGRAM REPORT: CPT | Mod: ,,, | Performed by: STUDENT IN AN ORGANIZED HEALTH CARE EDUCATION/TRAINING PROGRAM

## 2024-09-12 PROCEDURE — 36415 COLL VENOUS BLD VENIPUNCTURE: CPT | Performed by: NURSE PRACTITIONER

## 2024-09-12 PROCEDURE — 96375 TX/PRO/DX INJ NEW DRUG ADDON: CPT

## 2024-09-12 PROCEDURE — 80048 BASIC METABOLIC PNL TOTAL CA: CPT | Performed by: NURSE PRACTITIONER

## 2024-09-12 PROCEDURE — 85025 COMPLETE CBC W/AUTO DIFF WBC: CPT | Performed by: NURSE PRACTITIONER

## 2024-09-12 PROCEDURE — 85379 FIBRIN DEGRADATION QUANT: CPT | Performed by: NURSE PRACTITIONER

## 2024-09-12 RX ORDER — IBUPROFEN 800 MG/1
800 TABLET ORAL 3 TIMES DAILY
Qty: 15 TABLET | Refills: 0 | Status: SHIPPED | OUTPATIENT
Start: 2024-09-12 | End: 2024-09-17

## 2024-09-12 RX ORDER — ONDANSETRON HYDROCHLORIDE 2 MG/ML
4 INJECTION, SOLUTION INTRAVENOUS
Status: COMPLETED | OUTPATIENT
Start: 2024-09-12 | End: 2024-09-12

## 2024-09-12 RX ORDER — MORPHINE SULFATE 4 MG/ML
4 INJECTION, SOLUTION INTRAMUSCULAR; INTRAVENOUS
Status: COMPLETED | OUTPATIENT
Start: 2024-09-12 | End: 2024-09-12

## 2024-09-12 RX ORDER — METHOCARBAMOL 500 MG/1
1000 TABLET, FILM COATED ORAL 3 TIMES DAILY
Qty: 30 TABLET | Refills: 0 | Status: SHIPPED | OUTPATIENT
Start: 2024-09-12 | End: 2024-09-17

## 2024-09-12 RX ADMIN — ONDANSETRON 4 MG: 2 INJECTION INTRAMUSCULAR; INTRAVENOUS at 01:09

## 2024-09-12 RX ADMIN — MORPHINE SULFATE 4 MG: 4 INJECTION, SOLUTION INTRAMUSCULAR; INTRAVENOUS at 01:09

## 2024-09-12 NOTE — ED TRIAGE NOTES
Patient arrives to ED with complaints of left sided chest pain and arm numbness that started this morning.   
Yes

## 2024-09-12 NOTE — ED PROVIDER NOTES
Encounter Date: 9/12/2024       History     Chief Complaint   Patient presents with    Chest Pain    Numbness     Left     43-year-old female presents to the emergency department to be evaluated for pain to the left side of her chest, left shoulder and left arm that began around 8:00 a.m. this morning while she was getting ready for work.  Her pain increases when she bends, twists, lifts her arm or takes a deep breath.  Denies any exertional pain or shortness of breath.    The history is provided by the patient.   Chest Pain  Chest pain is worsened by certain positions and deep breathing. Pertinent negatives for primary symptoms include no fever, no fatigue, no syncope, no shortness of breath, no cough, no wheezing, no palpitations, no abdominal pain, no nausea, no vomiting, no dizziness and no altered mental status.     Review of patient's allergies indicates:   Allergen Reactions    Amoxicillin Rash     Past Medical History:   Diagnosis Date    Acid reflux     Diabetes mellitus, type 2     HTN (hypertension)     Hyperlipidemia     Migraine with aura     Obesity      Past Surgical History:   Procedure Laterality Date    CHOLECYSTECTOMY      TONSILLECTOMY       Family History   Problem Relation Name Age of Onset    Asthma Mother      Diabetes Mother      Hypertension Mother      Hypertension Father      Diabetes Paternal Grandmother      Heart disease Paternal Grandfather      Hypertension Sister      No Known Problems Brother      No Known Problems Brother       Social History     Tobacco Use    Smoking status: Never    Smokeless tobacco: Never   Substance Use Topics    Alcohol use: Yes    Drug use: Not Currently     Review of Systems   Constitutional:  Negative for fatigue and fever.   Respiratory:  Negative for cough, shortness of breath and wheezing.    Cardiovascular:  Positive for chest pain. Negative for palpitations and syncope.   Gastrointestinal:  Negative for abdominal pain, nausea and vomiting.    Neurological:  Negative for dizziness.   All other systems reviewed and are negative.      Physical Exam     Initial Vitals [09/12/24 1143]   BP Pulse Resp Temp SpO2   124/85 109 20 98.6 °F (37 °C) 97 %      MAP       --         Physical Exam    Vitals reviewed.  Constitutional: She appears well-developed and well-nourished.   Neck: Neck supple.   Cardiovascular:  Normal rate and regular rhythm.           Pulmonary/Chest: Breath sounds normal.   Abdominal: Abdomen is soft. Bowel sounds are normal. She exhibits no distension and no mass. There is no abdominal tenderness. There is no rebound and no guarding.   Musculoskeletal:         General: No tenderness or edema. Normal range of motion.      Cervical back: Neck supple.     Neurological: She is alert and oriented to person, place, and time. She has normal strength. GCS score is 15. GCS eye subscore is 4. GCS verbal subscore is 5. GCS motor subscore is 6.   Skin: Skin is warm and dry. Capillary refill takes less than 2 seconds.   Psychiatric: She has a normal mood and affect.         Medical Screening Exam   See Full Note    ED Course   Procedures  Labs Reviewed   BASIC METABOLIC PANEL - Abnormal       Result Value    Sodium 136      Potassium 3.8      Chloride 102      CO2 25      Anion Gap 13      Glucose 306 (*)     BUN 11      Creatinine 1.17 (*)     BUN/Creatinine Ratio 9      Calcium 9.6      eGFR 60     CBC WITH DIFFERENTIAL - Abnormal    WBC 7.96      RBC 4.93      Hemoglobin 14.3      Hematocrit 44.2      MCV 89.7      MCH 29.0      MCHC 32.4      RDW 12.6      Platelet Count 287      MPV 10.4      Neutrophils % 51.1 (*)     Lymphocytes % 38.1      Monocytes % 6.4 (*)     Eosinophils % 3.4      Basophils % 0.6      Immature Granulocytes % 0.4      nRBC, Auto 0.0      Neutrophils, Abs 4.07      Lymphocytes, Absolute 3.03      Monocytes, Absolute 0.51      Eosinophils, Absolute 0.27      Basophils, Absolute 0.05      Immature Granulocytes, Absolute 0.03       nRBC, Absolute 0.00      Diff Type Auto     TROPONIN I - Normal    Troponin I High Sensitivity <4.0     PROTIME-INR - Normal    PT 13.5      INR 1.04     APTT - Normal    PTT 25.2     MAGNESIUM - Normal    Magnesium 2.0     TROPONIN I - Normal    Troponin I High Sensitivity 4.0     D DIMER, QUANTITATIVE - Normal    D-Dimer 0.36     CBC W/ AUTO DIFFERENTIAL    Narrative:     The following orders were created for panel order CBC Auto Differential.  Procedure                               Abnormality         Status                     ---------                               -----------         ------                     CBC with Differential[[id number]]       Abnormal            Final result                 Please view results for these tests on the individual orders.          Imaging Results              X-Ray Chest 1 View (Final result)  Result time 09/12/24 12:21:56      Final result by Gary Cheng MD (09/12/24 12:21:56)                   Impression:      No acute chest disease.      Electronically signed by: Gary Cheng  Date:    09/12/2024  Time:    12:21               Narrative:    EXAMINATION:  XR CHEST 1 VIEW    CLINICAL HISTORY:  Chest pain, unspecified    TECHNIQUE:  Single frontal view of the chest was performed.    FINDINGS:  Pulmonary hypoinflation crowding of the bronchopulmonary vessels.  No focal consolidation.  Heart size normal.  Bony thorax intact.                                       Medications   morphine injection 4 mg (4 mg Intravenous Given 9/12/24 1350)   ondansetron injection 4 mg (4 mg Intravenous Given 9/12/24 1349)     Medical Decision Making  43-year-old female presents to the emergency department to be evaluated for pain to the left side of her chest, left shoulder and left arm that began around 8:00 a.m. this morning while she was getting ready for work.  Her pain increases when she bends, twists, lifts her arm or takes a deep breath.  Denies any exertional pain or  shortness of breath.  EKG ordered, reviewed by Dr. Wilhelm, rate is 109, sinus tachycardia, no STEMI  X-ray ordered, films reviewed as well as the radiologist's interpretation significant for no acute cardiopulmonary disease  Labs ordered and reviewed  Diagnosis: Chest wall pain  Treated with morphine and Zofran in the emergency department  Prescribed Motrin and Robaxin    Amount and/or Complexity of Data Reviewed  Labs: ordered.  Radiology: ordered.    Risk  Prescription drug management.                                      Clinical Impression:   Final diagnoses:  [R07.9] Chest pain  [R07.89] Chest wall pain (Primary)        ED Disposition Condition    Discharge Stable          ED Prescriptions       Medication Sig Dispense Start Date End Date Auth. Provider    methocarbamoL (ROBAXIN) 500 MG Tab Take 2 tablets (1,000 mg total) by mouth 3 (three) times daily. for 5 days 30 tablet 9/12/2024 9/17/2024 Ashly Whitman FNP    ibuprofen (ADVIL,MOTRIN) 800 MG tablet Take 1 tablet (800 mg total) by mouth 3 (three) times daily. for 5 days... take with food 15 tablet 9/12/2024 9/17/2024 Ashly Whitman FNP          Follow-up Information    None          Ashly Whitman FNP  09/12/24 [id number]

## 2024-09-12 NOTE — Clinical Note
"Ashly Carpenterica"Twan was seen and treated in our emergency department on 9/12/2024.  She may return to work on 09/14/2024.       If you have any questions or concerns, please don't hesitate to call.      Cyndi LEGER    "

## 2024-09-13 ENCOUNTER — TELEPHONE (OUTPATIENT)
Dept: EMERGENCY MEDICINE | Facility: HOSPITAL | Age: 44
End: 2024-09-13
Payer: COMMERCIAL

## 2024-09-13 LAB
OHS QRS DURATION: 72 MS
OHS QTC CALCULATION: 455 MS

## 2024-09-17 ENCOUNTER — CLINICAL SUPPORT (OUTPATIENT)
Dept: REHABILITATION | Facility: HOSPITAL | Age: 44
End: 2024-09-17
Payer: COMMERCIAL

## 2024-09-17 DIAGNOSIS — G89.29 CHRONIC MIDLINE LOW BACK PAIN WITH LEFT-SIDED SCIATICA: Primary | ICD-10-CM

## 2024-09-17 DIAGNOSIS — M54.42 CHRONIC MIDLINE LOW BACK PAIN WITH LEFT-SIDED SCIATICA: Primary | ICD-10-CM

## 2024-09-17 PROCEDURE — 97112 NEUROMUSCULAR REEDUCATION: CPT | Mod: CQ

## 2024-09-17 PROCEDURE — 97110 THERAPEUTIC EXERCISES: CPT | Mod: CQ

## 2024-09-17 NOTE — PROGRESS NOTES
[facility]   Physical Therapy Treatment Note      Name: Ashly Trent  Clinic Number: [id number]    Therapy Diagnosis:   Encounter Diagnosis   Name Primary?    Chronic midline low back pain with left-sided sciatica Yes     Physician: Rosaura Manzanares FNP    Visit Date: 9/17/2024    Physician Orders: PT Eval and Treat  Medical Diagnosis from Referral: M54.42,G89.29 (ICD-10-CM) - Chronic midline low back pain with left-sided sciatica  Evaluation Date: 8/13/2024  Authorization Period Expiration: 11/13/2024  Plan of Care Expiration: 9/27/2024  Visit # / Visits authorized: 9/13     PTA Visit #: 1/5     Time In: 1:38 pm  Time Out:  2:31 pm  Total Billable Time:  53 billable minutes    Subjective     Pt reports: Her low back is feeling better today than it was yesterday    She was compliant with home exercise program.  Response to previous treatment: no complaints   Functional change: no change noted    Pain: 4/10  Location: low back      Objective      Objective Measures updated at progress report unless specified.     Treatment     Ashly received the treatments listed below:      therapeutic exercises to develop strength, endurance, ROM, flexibility, posture, and core stabilization for 15 minutes including:    NuStep: x 5 minutes  Supine piriformis stretch: 3 x 20 second holds  Seated figure 4 stretch: 3 x 20 second holds  Lower trunk rotation: x 5 reps with 10 second holds each  Leg press (bilateral): 5 plates; x 20 reps    manual therapy techniques: Joint mobilizations, Manual traction, Myofacial release, Soft tissue Mobilization, and Friction Massage were applied to the low back for 0 minutes, including:    (not performed)     neuromuscular re-education activities to improve: Balance, Coordination, Kinesthetic Sense, Proprioception, and Posture for 38 minutes. The following activities were included:    Supine posterior pelvic tilts: x 20 rep with 3 second holds  Supine posterior  pelvic tilts with bridges with abduction: silver theraband: x 20 reps  Supine posterior pelvic tilts with alternating marching: silver theraband; x 20 reps  Standing core stabilizations with alternating marching in squat rack with black tubing: x 20 reps each  Cybex lumbar extensions: 3 plates; x 20 reps   Paloff press: blue theraband; 2 x 10 each direction  Cybex hip ext--2 plates--2 x 10    (Not Today)  SL hypers leaning on mat--2 x 10 (not performed)     Supine ball hugs: x 20 reps with 3 second holds (not performed)     therapeutic activities to improve functional performance for 0 minutes, including:    (not performed)     Patient Education and Home Exercises       Education provided: Patient educated on the importance of completing skilled physical therapy treatment and home exercise program as prescribed to maximize functional gains.     Written Home Exercises Provided: Patient instructed to cont prior HEP. Exercises were reviewed and Ashly was able to demonstrate them prior to the end of the session. Ashly demonstrated good understanding of the education provided. See EMR under Patient Instructions for exercises provided during therapy sessions    Assessment     At Evaluation:  Ashly is a 43 y.o. female referred to outpatient physical therapy with a medical diagnosis of M54.42,G89.29 (ICD-10-CM) - Chronic midline low back pain with left-sided sciatica. Pt presents to PT with low back pain with left lower extremity radiculopathy, decreased lumbar flexion, range of motion, left greater than right lower extremity weakness, abnormal thoracolumbar posture, and abnormal gait mechanics. Patient may benefit from further diagnostic imaging should her symptoms not resolve with skilled physical therapy treatment.    Current Assessment:  Ashly arrived with reports of less pain than yesterday. Patient with good effort throughout treatment but with noted fatigue. Patient with good tolerance of the addition of leg  press but with fatigue noted. Added Cybex hip extension without issue. Physical Therapist will continue to progress therapeutic exercise, neuromuscular re-education, therapeutic activities, and gait training as able with manual therapy and modalities utilized as needed. Cont POCAmbrose Limon Is progressing towards her goals.   Pt prognosis is Good.     Pt will continue to benefit from skilled outpatient physical therapy to address the deficits listed in the problem list box on initial evaluation, provide pt/family education and to maximize pt's level of independence in the home and community environment.     Pt's spiritual, cultural and educational needs considered and pt agreeable to plan of care and goals.     Anticipated barriers to physical therapy: none    Goals:   Short Term Goals:  Patient will demonstrate independence with home exercise program to ensure carryover of treatment.  Patient will demonstrate 35 degrees of thoracolumbar flexion active range of motion to improve functional use of the thoracolumbar spine.  Patient will improve bilateral lower extremity strength to 4+/5 to improve independence and safety with bed mobility, transfers, and gait.  Patient will ambulate 150 feet without assistive device with independence to improve independence and safety with gait.  Patient will report a reduction in low back and left lower extremity pain from 10/10 to 8/10 at worst to improve quality of life.      Long Term Goals:  Patient will demonstrate 45 degrees of thoracolumbar flexion active range of motion to improve functional use of the thoracolumbar spine.  Patient will improve bilateral lower extremity strength to 5/5 to improve independence and safety with bed mobility, transfers, and gait.  Patient will ambulate 300 feet without an assistive device with independence with normal gait mechanics including up/down curbs and ramps to improve independence and safety with community ambulation.  Patient will  report a reduction in low back and left lower extremity pain from 10/10 to 6/10 at worst to improve quality of life.     Plan     Plan of care Certification: 8/13/2024 to 9/27/2024.     Outpatient Physical Therapy 2 times weekly for 6 weeks to include the following interventions: Electrical Stimulation (IFC/premodulated IFC), Gait Training, Manual Therapy, Moist Heat/ Ice, Neuromuscular Re-ed, Patient Education, Therapeutic Activities, and Therapeutic Exercise.     Supervisory visit/case conference performed with ARLET Moses and ARLET Brody to discuss evaluation findings, goals, and plan of care.       Jg Gonsalez PTA, DPT  09/17/2024

## 2024-09-19 ENCOUNTER — CLINICAL SUPPORT (OUTPATIENT)
Dept: REHABILITATION | Facility: HOSPITAL | Age: 44
End: 2024-09-19
Payer: COMMERCIAL

## 2024-09-19 DIAGNOSIS — M54.42 CHRONIC MIDLINE LOW BACK PAIN WITH LEFT-SIDED SCIATICA: Primary | ICD-10-CM

## 2024-09-19 DIAGNOSIS — G89.29 CHRONIC MIDLINE LOW BACK PAIN WITH LEFT-SIDED SCIATICA: Primary | ICD-10-CM

## 2024-09-19 PROCEDURE — 97112 NEUROMUSCULAR REEDUCATION: CPT | Mod: CQ

## 2024-09-19 PROCEDURE — 97110 THERAPEUTIC EXERCISES: CPT | Mod: CQ

## 2024-09-19 NOTE — PROGRESS NOTES
[facility]   Physical Therapy Treatment Note      Name: Ashly Trent  Clinic Number: [id number]    Therapy Diagnosis:   Encounter Diagnosis   Name Primary?    Chronic midline low back pain with left-sided sciatica Yes     Physician: Rosaura Manzanares FNP    Visit Date: 9/19/2024    Physician Orders: PT Eval and Treat  Medical Diagnosis from Referral: M54.42,G89.29 (ICD-10-CM) - Chronic midline low back pain with left-sided sciatica  Evaluation Date: 8/13/2024  Authorization Period Expiration: 11/13/2024  Plan of Care Expiration: 9/27/2024  Visit # / Visits authorized: 10/13     PTA Visit #: 2/5     Time In: 1:25 pm  Time Out:  2:00 pm  Total Billable Time:   23 billable minutes (12 min non billed)    Subjective     Pt reports: her back is hurting a little better    She was compliant with home exercise program.  Response to previous treatment: no complaints   Functional change: no change noted    Pain: 5/10  Location: low back      Objective      Objective Measures updated at progress report unless specified.     Treatment     Ashly received the treatments listed below:      therapeutic exercises to develop strength, endurance, ROM, flexibility, posture, and core stabilization for 8 billable minutes including:    NuStep: x 5 minutes  Supine piriformis stretch: 3 x 20 second holds  Seated figure 4 stretch: 3 x 20 second holds  Lower trunk rotation: x 5 reps with 10 second holds each  Leg press (bilateral): 5 plates; x 20 reps--with PPT    manual therapy techniques: Joint mobilizations, Manual traction, Myofacial release, Soft tissue Mobilization, and Friction Massage were applied to the low back for 0 minutes, including:    (not performed)     neuromuscular re-education activities to improve: Balance, Coordination, Kinesthetic Sense, Proprioception, and Posture for 15 billable minutes. The following activities were included:    Supine posterior pelvic tilts: x 20 rep with 3  second holds  Supine posterior pelvic tilts with bridges with abduction: silver theraband: x 20 reps  Supine posterior pelvic tilts with alternating marching: silver theraband; x 20 reps  Standing core stabilizations with alternating marching in squat rack with black tubing: x 20 reps each  Cybex lumbar extensions: 3 plates; x 20 reps   Paloff press: blue cord; 2 x 10 each direction'  Stir the pot--Blue cord--3 x 10 each direction  Cybex hip ext--2 plates--2 x 10    (Not Today)  SL hypers leaning on mat--2 x 10 (not performed)     Supine ball hugs: x 20 reps with 3 second holds (not performed)     therapeutic activities to improve functional performance for 0 minutes, including:    (not performed)     Patient Education and Home Exercises       Education provided: Patient educated on the importance of completing skilled physical therapy treatment and home exercise program as prescribed to maximize functional gains.     Written Home Exercises Provided: Patient instructed to cont prior HEP. Exercises were reviewed and Ashly was able to demonstrate them prior to the end of the session. Ashly demonstrated good understanding of the education provided. See EMR under Patient Instructions for exercises provided during therapy sessions    Assessment     At Evaluation:  Ashly is a 43 y.o. female referred to outpatient physical therapy with a medical diagnosis of M54.42,G89.29 (ICD-10-CM) - Chronic midline low back pain with left-sided sciatica. Pt presents to PT with low back pain with left lower extremity radiculopathy, decreased lumbar flexion, range of motion, left greater than right lower extremity weakness, abnormal thoracolumbar posture, and abnormal gait mechanics. Patient may benefit from further diagnostic imaging should her symptoms not resolve with skilled physical therapy treatment.    Current Assessment:  Ashly arrived with reports of less pain than yesterday. Patient with good effort throughout treatment  but with noted fatigue. Patient with good tolerance of the addition of leg press but with fatigue noted. Added Stir the pot  without issue. Physical Therapist will continue to progress therapeutic exercise, neuromuscular re-education, therapeutic activities, and gait training as able with manual therapy and modalities utilized as needed. Probable DC at end of scheduled visits.  Cont POCAmbrose Limon Is progressing towards her goals.   Pt prognosis is Good.     Pt will continue to benefit from skilled outpatient physical therapy to address the deficits listed in the problem list box on initial evaluation, provide pt/family education and to maximize pt's level of independence in the home and community environment.     Pt's spiritual, cultural and educational needs considered and pt agreeable to plan of care and goals.     Anticipated barriers to physical therapy: none    Goals:   Short Term Goals:  Patient will demonstrate independence with home exercise program to ensure carryover of treatment.  Patient will demonstrate 35 degrees of thoracolumbar flexion active range of motion to improve functional use of the thoracolumbar spine.  Patient will improve bilateral lower extremity strength to 4+/5 to improve independence and safety with bed mobility, transfers, and gait.  Patient will ambulate 150 feet without assistive device with independence to improve independence and safety with gait.  Patient will report a reduction in low back and left lower extremity pain from 10/10 to 8/10 at worst to improve quality of life.      Long Term Goals:  Patient will demonstrate 45 degrees of thoracolumbar flexion active range of motion to improve functional use of the thoracolumbar spine.  Patient will improve bilateral lower extremity strength to 5/5 to improve independence and safety with bed mobility, transfers, and gait.  Patient will ambulate 300 feet without an assistive device with independence with normal gait mechanics  including up/down curbs and ramps to improve independence and safety with community ambulation.  Patient will report a reduction in low back and left lower extremity pain from 10/10 to 6/10 at worst to improve quality of life.     Plan     Plan of care Certification: 8/13/2024 to 9/27/2024.     Outpatient Physical Therapy 2 times weekly for 6 weeks to include the following interventions: Electrical Stimulation (IFC/premodulated IFC), Gait Training, Manual Therapy, Moist Heat/ Ice, Neuromuscular Re-ed, Patient Education, Therapeutic Activities, and Therapeutic Exercise.     Supervisory visit/case conference performed with ARLET Moses and ARLET Brody to discuss evaluation findings, goals, and plan of care.       Jg Gonsalez PTA, DPT  09/19/2024

## 2024-09-24 ENCOUNTER — CLINICAL SUPPORT (OUTPATIENT)
Dept: REHABILITATION | Facility: HOSPITAL | Age: 44
End: 2024-09-24
Payer: COMMERCIAL

## 2024-09-24 DIAGNOSIS — M54.42 CHRONIC MIDLINE LOW BACK PAIN WITH LEFT-SIDED SCIATICA: Primary | ICD-10-CM

## 2024-09-24 DIAGNOSIS — G89.29 CHRONIC MIDLINE LOW BACK PAIN WITH LEFT-SIDED SCIATICA: Primary | ICD-10-CM

## 2024-09-24 PROCEDURE — 97110 THERAPEUTIC EXERCISES: CPT

## 2024-09-24 PROCEDURE — 97112 NEUROMUSCULAR REEDUCATION: CPT

## 2024-09-24 NOTE — PROGRESS NOTES
[facility]   Physical Therapy Updated Plan of Care     Name: Ashly Trent  Clinic Number: [id number]    Therapy Diagnosis:   Encounter Diagnosis   Name Primary?    Chronic midline low back pain with left-sided sciatica Yes     Physician: Rosaura Manzanares FNP    Visit Date: 9/24/2024    Physician Orders: PT Eval and Treat  Medical Diagnosis from Referral: M54.42,G89.29 (ICD-10-CM) - Chronic midline low back pain with left-sided sciatica  Evaluation Date: 8/13/2024  Authorization Period Expiration: 11/13/2024  Plan of Care Expiration: 10/4/2024  Visit # / Visits authorized: 11/13     PTA Visit #: 0/5     Time In: 1:50 pm  Time Out:  2:35 pm  Total Billable Time: 40 billable minutes    Subjective     Pt reports: Her back is bothering her today.     She was compliant with home exercise program.  Response to previous treatment: no complaints   Functional change: no change noted    Pain: 7/10  Location: low back      Objective      Objective Measures updated at progress report unless specified.     Treatment     Ashly received the treatments listed below:      therapeutic exercises to develop strength, endurance, ROM, flexibility, posture, and core stabilization for 16 billable minutes including:    NuStep: x 5 minutes  Calf stretch on slant board: 2 minutes  Supine piriformis stretch: 3 x 20 second holds  Lower trunk rotation: x 5 reps with 10 second holds each  Leg press (bilateral): 5 plates; x 20 reps    Seated figure 4 stretch: 3 x 20 second holds (not performed)     manual therapy techniques: Joint mobilizations, Manual traction, Myofacial release, Soft tissue Mobilization, and Friction Massage were applied to the low back for 0 minutes, including:    (not performed)     neuromuscular re-education activities to improve: Balance, Coordination, Kinesthetic Sense, Proprioception, and Posture for 24 billable minutes. The following activities were included:    Supine posterior  pelvic tilts: x 20 rep with 3 second holds  Supine posterior pelvic tilts with bridges with abduction: silver theraband: x 20 reps  Supine posterior pelvic tilts with alternating marching: silver theraband; x 20 reps  Standing core stabilizations with alternating marching in squat rack with black tubing: x 20 reps each  Paloff press: blue cord; 2 x 10 reps each direction  Stir the pot: blue cord; 3 x 10 reps each direction  Cybex lumbar extensions: 3 plates; x 20 reps    Cybex hip ext--2 plates--2 x 10 (not performed)   SL hypers leaning on mat--2 x 10 (not performed)     Supine ball hugs: x 20 reps with 3 second holds (not performed)     therapeutic activities to improve functional performance for 0 minutes, including:    (not performed)     Patient Education and Home Exercises       Education provided: Patient educated on the importance of completing skilled physical therapy treatment and home exercise program as prescribed to maximize functional gains.     Written Home Exercises Provided: Patient instructed to cont prior HEP. Exercises were reviewed and Ashly was able to demonstrate them prior to the end of the session. Ashly demonstrated good understanding of the education provided. See EMR under Patient Instructions for exercises provided during therapy sessions    Assessment     At Evaluation:  Ashly is a 43 y.o. female referred to outpatient physical therapy with a medical diagnosis of M54.42,G89.29 (ICD-10-CM) - Chronic midline low back pain with left-sided sciatica. Pt presents to PT with low back pain with left lower extremity radiculopathy, decreased lumbar flexion, range of motion, left greater than right lower extremity weakness, abnormal thoracolumbar posture, and abnormal gait mechanics. Patient may benefit from further diagnostic imaging should her symptoms not resolve with skilled physical therapy treatment.    Current Assessment:  Ashly arrived with reports of 7/10 low back pain  (concentrated on the left). Patient with good effort throughout treatment but with noted fatigue. Patient with 2 authorized physical therapy treatment sessions remaining. Patient's plan of care updated today (date extended) to allow patient to complete her authorized visits. Physical Therapist will continue to progress therapeutic exercise, neuromuscular re-education, therapeutic activities, and gait training as able with manual therapy and modalities utilized as needed. Probable DC at end of scheduled visits.    Ashly Is progressing towards her goals.   Pt prognosis is Good.     Pt will continue to benefit from skilled outpatient physical therapy to address the deficits listed in the problem list box on initial evaluation, provide pt/family education and to maximize pt's level of independence in the home and community environment.     Pt's spiritual, cultural and educational needs considered and pt agreeable to plan of care and goals.     Anticipated barriers to physical therapy: none    Goals:     Short Term Goals:  Patient will demonstrate independence with home exercise program to ensure carryover of treatment. [Met]  Patient will demonstrate 35 degrees of thoracolumbar flexion active range of motion to improve functional use of the thoracolumbar spine. [Met]  Patient will improve bilateral lower extremity strength to 4+/5 to improve independence and safety with bed mobility, transfers, and gait. [Not met]  Patient will ambulate 150 feet without assistive device with independence to improve independence and safety with gait. [Met]  Patient will report a reduction in low back and left lower extremity pain from 10/10 to 8/10 at worst to improve quality of life. [Met]     Long Term Goals:  Patient will demonstrate 45 degrees of thoracolumbar flexion active range of motion to improve functional use of the thoracolumbar spine. [Not met]  Patient will improve bilateral lower extremity strength to 5/5 to improve  independence and safety with bed mobility, transfers, and gait. [Not met]  Patient will ambulate 300 feet without an assistive device with independence with normal gait mechanics including up/down curbs and ramps to improve independence and safety with community ambulation. [Not met]  Patient will report a reduction in low back and left lower extremity pain from 10/10 to 6/10 at worst to improve quality of life. [Not met]     Plan     Plan of care Certification: 9/24/2024 to 10/4/2024.     Outpatient Physical Therapy 2 times weekly for 2 weeks (3 visits) to include the following interventions: Electrical Stimulation (IFC/premodulated IFC), Gait Training, Manual Therapy, Moist Heat/ Ice, Neuromuscular Re-ed, Patient Education, Therapeutic Activities, and Therapeutic Exercise.     Supervisory visit/case conference performed with ARLET Moses and ARLET Brody to discuss evaluation findings, goals, and plan of care.       VIDA MAHAN, PT, DPT  09/24/2024

## 2024-09-26 ENCOUNTER — CLINICAL SUPPORT (OUTPATIENT)
Dept: REHABILITATION | Facility: HOSPITAL | Age: 44
End: 2024-09-26
Payer: COMMERCIAL

## 2024-09-26 DIAGNOSIS — G89.29 CHRONIC MIDLINE LOW BACK PAIN WITH LEFT-SIDED SCIATICA: Primary | ICD-10-CM

## 2024-09-26 DIAGNOSIS — M54.42 CHRONIC MIDLINE LOW BACK PAIN WITH LEFT-SIDED SCIATICA: Primary | ICD-10-CM

## 2024-09-26 PROCEDURE — 97110 THERAPEUTIC EXERCISES: CPT | Mod: CQ

## 2024-09-26 PROCEDURE — 97112 NEUROMUSCULAR REEDUCATION: CPT | Mod: CQ

## 2024-09-26 NOTE — PATIENT INSTRUCTIONS
Access Code: [email or web address]  URL: [email or web address]  Date: 09/26/2024  Prepared by: Jg Gonsalez    Exercises  - Seated Piriformis Stretch  - 1 x daily - 7 x weekly - 1 sets - 3 reps - 30 sec hold  - Seated Piriformis Stretch  - 1 x daily - 7 x weekly - 1 sets - 3 reps - 30 sec hold  - Hooklying Lumbar Rotation  - 1 x daily - 7 x weekly - 1 sets - 10 reps - 10 sec hold  - Supine bridge with Posterior Pelvic Tilt  - 1 x daily - 7 x weekly - 2 sets - 10 reps  - Supine March with Resistance Band  - 1 x daily - 7 x weekly - 2 sets - 10 reps  - Standing Anti-Rotation Press with Anchored Resistance  - 1 x daily - 7 x weekly - 3 sets - 10 reps  - Stir the Pot  - 1 x daily - 7 x weekly - 3 sets - 10 reps

## 2024-09-26 NOTE — PROGRESS NOTES
[facility]   Physical Therapy Updated Plan of Care     Name: Ashly Trent  Clinic Number: [id number]    Therapy Diagnosis:   Encounter Diagnosis   Name Primary?    Chronic midline low back pain with left-sided sciatica Yes     Physician: Rosaura Manzanares FNP    Visit Date: 9/26/2024    Physician Orders: PT Eval and Treat  Medical Diagnosis from Referral: M54.42,G89.29 (ICD-10-CM) - Chronic midline low back pain with left-sided sciatica  Evaluation Date: 8/13/2024  Authorization Period Expiration: 11/13/2024  Plan of Care Expiration: 10/4/2024  Visit # / Visits authorized: 12/13     PTA Visit #: 1/5     Time In: 1:30 pm  Time Out:  2:06 pm  Total Billable Time:  23 billable minutes (13 min non billed)    Subjective     Pt reports: Her back is feeling better today    She was compliant with home exercise program.  Response to previous treatment: no complaints   Functional change: no change noted    Pain: 2/10  Location: low back      Objective      Objective Measures updated at progress report unless specified.     Treatment     Ashly received the treatments listed below:      therapeutic exercises to develop strength, endurance, ROM, flexibility, posture, and core stabilization for 8 billable minutes including:    NuStep: x 5 minutes  Calf stretch on slant board: 2 minutes  Supine piriformis stretch: 3 x 20 second holds  Lower trunk rotation: x 5 reps with 10 second holds each  Leg press (bilateral): 5 plates; x 20 reps    Seated figure 4 stretch: 3 x 20 second holds (not performed)     manual therapy techniques: Joint mobilizations, Manual traction, Myofacial release, Soft tissue Mobilization, and Friction Massage were applied to the low back for 0 minutes, including:    (not performed)     neuromuscular re-education activities to improve: Balance, Coordination, Kinesthetic Sense, Proprioception, and Posture for 15 billable minutes. The following activities were  included:    Supine posterior pelvic tilts: x 20 rep with 3 second holds  Supine posterior pelvic tilts with bridges with abduction: silver theraband: x 20 reps  Supine posterior pelvic tilts with alternating marching: silver theraband; x 20 reps  Standing core stabilizations with alternating marching in squat rack with black tubing: x 20 reps each  Paloff press: blue cord; 2 x 10 reps each direction  Stir the pot: blue cord; 3 x 10 reps each direction  Cybex lumbar extensions: 3 plates; x 20 reps    Cybex hip ext--2 plates--2 x 10 (not performed)   SL hypers leaning on mat--2 x 10 (not performed)     Supine ball hugs: x 20 reps with 3 second holds (not performed)     therapeutic activities to improve functional performance for 0 minutes, including:    (not performed)     Patient Education and Home Exercises       Education provided: Patient educated on the importance of completing skilled physical therapy treatment and home exercise program as prescribed to maximize functional gains.     Written Home Exercises Provided: Patient instructed to cont prior HEP. Exercises were reviewed and Ashly was able to demonstrate them prior to the end of the session. Ashly demonstrated good understanding of the education provided. See EMR under Patient Instructions for exercises provided during therapy sessions    Assessment     At Evaluation:  Ashly is a 43 y.o. female referred to outpatient physical therapy with a medical diagnosis of M54.42,G89.29 (ICD-10-CM) - Chronic midline low back pain with left-sided sciatica. Pt presents to PT with low back pain with left lower extremity radiculopathy, decreased lumbar flexion, range of motion, left greater than right lower extremity weakness, abnormal thoracolumbar posture, and abnormal gait mechanics. Patient may benefit from further diagnostic imaging should her symptoms not resolve with skilled physical therapy treatment.    Current Assessment:  Ashly arrived with less pain  today.  Patient with good effort throughout treatment but with noted fatigue. Patient with 1 authorized physical therapy treatment sessions remaining. Patient's plan of care updated today (date extended) to allow patient to complete her authorized visits. Physical Therapist will continue to progress therapeutic exercise, neuromuscular re-education, therapeutic activities, and gait training as able with manual therapy and modalities utilized as needed. Will DC at next visit.  Updated HEP today    Ashly Is progressing towards her goals.   Pt prognosis is Good.     Pt will continue to benefit from skilled outpatient physical therapy to address the deficits listed in the problem list box on initial evaluation, provide pt/family education and to maximize pt's level of independence in the home and community environment.     Pt's spiritual, cultural and educational needs considered and pt agreeable to plan of care and goals.     Anticipated barriers to physical therapy: none    Goals:     Short Term Goals:  Patient will demonstrate independence with home exercise program to ensure carryover of treatment. [Met]  Patient will demonstrate 35 degrees of thoracolumbar flexion active range of motion to improve functional use of the thoracolumbar spine. [Met]  Patient will improve bilateral lower extremity strength to 4+/5 to improve independence and safety with bed mobility, transfers, and gait. [Not met]  Patient will ambulate 150 feet without assistive device with independence to improve independence and safety with gait. [Met]  Patient will report a reduction in low back and left lower extremity pain from 10/10 to 8/10 at worst to improve quality of life. [Met]     Long Term Goals:  Patient will demonstrate 45 degrees of thoracolumbar flexion active range of motion to improve functional use of the thoracolumbar spine. [Not met]  Patient will improve bilateral lower extremity strength to 5/5 to improve independence and  safety with bed mobility, transfers, and gait. [Not met]  Patient will ambulate 300 feet without an assistive device with independence with normal gait mechanics including up/down curbs and ramps to improve independence and safety with community ambulation. [Not met]  Patient will report a reduction in low back and left lower extremity pain from 10/10 to 6/10 at worst to improve quality of life. [Not met]     Plan     Plan of care Certification: 9/24/2024 to 10/4/2024.     Outpatient Physical Therapy 2 times weekly for 2 weeks (3 visits) to include the following interventions: Electrical Stimulation (IFC/premodulated IFC), Gait Training, Manual Therapy, Moist Heat/ Ice, Neuromuscular Re-ed, Patient Education, Therapeutic Activities, and Therapeutic Exercise.     Supervisory visit/case conference performed with ARLET Moses and ARLET Brody to discuss evaluation findings, goals, and plan of care.       Jg Gonsalez PTA, DPT  09/26/2024

## 2024-09-30 ENCOUNTER — OFFICE VISIT (OUTPATIENT)
Dept: PAIN MEDICINE | Facility: CLINIC | Age: 44
End: 2024-09-30
Payer: COMMERCIAL

## 2024-09-30 VITALS
BODY MASS INDEX: 47.09 KG/M2 | RESPIRATION RATE: 18 BRPM | DIASTOLIC BLOOD PRESSURE: 87 MMHG | HEIGHT: 66 IN | WEIGHT: 293 LBS | HEART RATE: 108 BPM | SYSTOLIC BLOOD PRESSURE: 139 MMHG

## 2024-09-30 DIAGNOSIS — M54.9 DORSALGIA, UNSPECIFIED: Primary | Chronic | ICD-10-CM

## 2024-09-30 DIAGNOSIS — M54.16 LUMBAR RADICULOPATHY, CHRONIC: Chronic | ICD-10-CM

## 2024-09-30 PROCEDURE — 3066F NEPHROPATHY DOC TX: CPT | Mod: CPTII,,, | Performed by: PAIN MEDICINE

## 2024-09-30 PROCEDURE — 3079F DIAST BP 80-89 MM HG: CPT | Mod: CPTII,,, | Performed by: PAIN MEDICINE

## 2024-09-30 PROCEDURE — 3008F BODY MASS INDEX DOCD: CPT | Mod: CPTII,,, | Performed by: PAIN MEDICINE

## 2024-09-30 PROCEDURE — 3046F HEMOGLOBIN A1C LEVEL >9.0%: CPT | Mod: CPTII,,, | Performed by: PAIN MEDICINE

## 2024-09-30 PROCEDURE — 99214 OFFICE O/P EST MOD 30 MIN: CPT | Mod: PBBFAC | Performed by: PAIN MEDICINE

## 2024-09-30 PROCEDURE — 3075F SYST BP GE 130 - 139MM HG: CPT | Mod: CPTII,,, | Performed by: PAIN MEDICINE

## 2024-09-30 PROCEDURE — 99999 PR PBB SHADOW E&M-EST. PATIENT-LVL IV: CPT | Mod: PBBFAC,,, | Performed by: PAIN MEDICINE

## 2024-09-30 PROCEDURE — 1159F MED LIST DOCD IN RCRD: CPT | Mod: CPTII,,, | Performed by: PAIN MEDICINE

## 2024-09-30 PROCEDURE — 4010F ACE/ARB THERAPY RXD/TAKEN: CPT | Mod: CPTII,,, | Performed by: PAIN MEDICINE

## 2024-09-30 PROCEDURE — 3060F POS MICROALBUMINURIA REV: CPT | Mod: CPTII,,, | Performed by: PAIN MEDICINE

## 2024-09-30 PROCEDURE — 99214 OFFICE O/P EST MOD 30 MIN: CPT | Mod: S$PBB,,, | Performed by: PAIN MEDICINE

## 2024-09-30 NOTE — PROGRESS NOTES
She Disclaimer: This note has been generated using voice-recognition software. There may be typographical errors that have been missed during proof-reading        Patient ID: Ashly Trent is a 43 y.o. female.      Chief Complaint: Low-back Pain and Leg Pain (left)      43-year-old female returns for re-evaluation after completing physical therapy.  She notes minimal improvement but still experiences lower back pain with radicular symptoms to the left lower extremity.  Zanaflex, ibuprofen and Tylenol No. 3 have provided partial relief.  She denies weakness, bowel or bladder involvement but still experiences some paresthesia of the lower extremities.  X-ray of the lumbar spine revealed moderate loss of disc space height at L5-S1 and osteophyte formation.  Due to chronic pain following conservative treatment, it is medically necessary to obtain a MRI of the lumbar spine.            Pain Assessment  Pain Assessment: 0-10  Pain Score:   7  Pain Location: Back (back and LLE pain)  Pain Descriptors: Constant, Sharp  Pain Frequency: Constant/continuous  Aggravating Factors: Standing, Walking  Pain Intervention(s): Rest, Medication (See eMAR)      A's of Opioid Risk Assessment  Activity:Patient has difficulty performing ADL.   Analgesia:Patients pain is not controlled by current medication.   Adverse Effects: Patient denies constipation or sedation.  Aberrant Behavior:  reviewed with no aberrant drug seeking/taking behavior.      Patient denies any suicidal or homicidal ideations    Physical Therapy/Home Exercise: yes, completed 6 weeks without significant improvement    X-Ray Chest 1 View  Narrative: EXAMINATION:  XR CHEST 1 VIEW    CLINICAL HISTORY:  Chest pain, unspecified    TECHNIQUE:  Single frontal view of the chest was performed.    FINDINGS:  Pulmonary hypoinflation crowding of the bronchopulmonary vessels.  No focal consolidation.  Heart size normal.  Bony thorax intact.  Impression: No acute chest  disease.    Electronically signed by: Gary Cheng  Date:    09/12/2024  Time:    12:21      Review of Systems   Constitutional: Negative.    HENT: Negative.     Eyes: Negative.    Respiratory: Negative.     Cardiovascular: Negative.    Gastrointestinal: Negative.    Endocrine: Negative.    Genitourinary: Negative.    Musculoskeletal:  Positive for arthralgias, back pain and leg pain (Left lower extremity).   Integumentary:  Negative.   Neurological:  Positive for numbness (Left lower extremity).   Hematological: Negative.    Psychiatric/Behavioral: Negative.               Past Medical History:   Diagnosis Date    Acid reflux     Diabetes mellitus, type 2     HTN (hypertension)     Hyperlipidemia     Migraine with aura     Obesity      Past Surgical History:   Procedure Laterality Date    CHOLECYSTECTOMY      TONSILLECTOMY       Social History     Socioeconomic History    Marital status: Single   Tobacco Use    Smoking status: Never    Smokeless tobacco: Never   Substance and Sexual Activity    Alcohol use: Yes    Drug use: Not Currently    Sexual activity: Yes     Social Drivers of Health     Financial Resource Strain: Low Risk  (8/9/2024)    Received from [facility]    Overall Financial Resource Strain (CARDIA)     Difficulty of Paying Living Expenses: Not very hard   Food Insecurity: No Food Insecurity (8/9/2024)    Received from [facility]    Hunger Vital Sign     Worried About Running Out of Food in the Last Year: Never true     Ran Out of Food in the Last Year: Never true   Transportation Needs: No Transportation Needs (8/9/2024)    Received from [facility]    PRAPARE - Transportation     Lack of Transportation (Medical): No     Lack of Transportation (Non-Medical): No   Physical Activity: Insufficiently Active (8/9/2024)    Received from [facility]    Exercise Vital Sign     Days of  Exercise per Week: 3 days     Minutes of Exercise per Session: 10 min   Stress: No Stress Concern Present (8/9/2024)    Received from [facility]    [employer] - Occupational Stress Questionnaire     Feeling of Stress : Not at all   Housing Stability: Low Risk  (8/9/2024)    Received from [facility]    Housing Stability Vital Sign     Unable to Pay for Housing in the Last Year: No     Number of Times Moved in the Last Year: 1     Homeless in the Last Year: No     Family History   Problem Relation Name Age of Onset    Asthma Mother      Diabetes Mother      Hypertension Mother      Hypertension Father      Diabetes Paternal Grandmother      Heart disease Paternal Grandfather      Hypertension Sister      No Known Problems Brother      No Known Problems Brother       Review of patient's allergies indicates:   Allergen Reactions    Amoxicillin Rash     has a current medication list which includes the following prescription(s): amlodipine, atorvastatin, bd ultra-fine colleen pen needle, ergocalciferol, halobetasol, losartan, norethindrone, pen needle, diabetic, pen needle, diabetic, ozempic, and gabapentin.      Objective:  Vitals:    09/30/24 1422   BP: 139/87   Pulse: 108   Resp: 18        Physical Exam  Vitals and nursing note reviewed.   Constitutional:       General: She is not in acute distress.     Appearance: Normal appearance. She is not ill-appearing, toxic-appearing or diaphoretic.   HENT:      Head: Normocephalic and atraumatic.      Nose: Nose normal.      Mouth/Throat:      Mouth: Mucous membranes are moist.   Eyes:      Extraocular Movements: Extraocular movements intact.      Pupils: Pupils are equal, round, and reactive to light.   Cardiovascular:      Rate and Rhythm: Normal rate and regular rhythm.      Heart sounds: Normal heart sounds.   Pulmonary:      Effort: Pulmonary effort is normal. No respiratory distress.       Breath sounds: Normal breath sounds. No stridor. No wheezing or rhonchi.   Abdominal:      General: Bowel sounds are normal.      Palpations: Abdomen is soft.   Musculoskeletal:         General: No swelling or deformity.      Cervical back: Normal and normal range of motion. No spasms or tenderness. No pain with movement. Normal range of motion.      Thoracic back: Normal.      Lumbar back: Tenderness and bony tenderness present. No spasms. Decreased range of motion. Positive left straight leg raise test. Negative right straight leg raise test. No scoliosis.      Right lower leg: No edema.      Left lower leg: No edema.      Comments: Lumbar pain with flexion, extension and lateral rotation.   Skin:     General: Skin is warm.   Neurological:      General: No focal deficit present.      Mental Status: She is alert and oriented to person, place, and time. Mental status is at baseline.      Cranial Nerves: No cranial nerve deficit.      Sensory: Sensation is intact. No sensory deficit.      Motor: No weakness.      Coordination: Coordination normal.      Gait: Gait normal.      Deep Tendon Reflexes: Reflexes are normal and symmetric.   Psychiatric:         Mood and Affect: Mood normal.         Behavior: Behavior normal.           Assessment:      1. Dorsalgia, unspecified    2. Lumbar radiculopathy, chronic          Plan:  1. reviewed  2..MRI is medically necessary for consideration of interventional nerve blocks or surgical intervention.  Patient has provided a medically directed home exercise program.    Orders Placed This Encounter   Procedures    MRI Lumbar Spine Without Contrast     Standing Status:   Future     Standing Expiration Date:   9/30/2025     Order Specific Question:   Does the patient have or ever had a pacemaker or a defibrillator (Note: Some facilities may not be able to schedule an MRI for patients with pacemakers and defibrillators. You should contact your local radiology dept to determine  if this is the case.)?     Answer:   No     Order Specific Question:   Does the patient have an aneurysm or surgical clip, pump, nerve/brain stimulator, middle/inner ear prosthesis, or other metal implant or foreign object (bullet, shrapnel)? If they have a card related to their implant, ask them to bring it. Issues related to the implant may cause the MRI to be delayed.     Answer:   No     Order Specific Question:   Will the patient require po anxiolysis or sedation?     Answer:   No     Order Specific Question:   May the Radiologist modify the order per protocol to meet the clinical needs of the patient?     Answer:   Yes     Order Specific Question:   Recist criteria?     Answer:   Yes     Order Specific Question:   Does the patient have on a skin patch for medication with aluminized backing?     Answer:   No      3. Continue vitamin-D for deficiency  4. Return after MRI for re-evaluation     report:  Reviewed and consistent with medication use as prescribed.      The total time spent for evaluation and management on 09/30/2024 including reviewing separately obtained history, performing a medically appropriate exam and evaluation, documenting clinical information in the health record, independently interpreting results and communicating them to the patient/family/caregiver, and ordering medications/tests/procedures was between 15-29 minutes.    The above plan and management options were discussed at length with patient. Patient is in agreement with the above and verbalized understanding. It will be communicated with the referring physician via electronic record, fax, or mail.

## 2024-09-30 NOTE — PATIENT INSTRUCTIONS
YOU ARE SCHEDULED ON 10/18/2024 AT 4:00 PM FOR YOUR LUMBAR MRI TO DONE AT THE [facility].  YOU WILL SEE DR TRAN ON OR ABOUT THE 10/23/2024 FOR FOLLOW UP AND RESULTS OF MRI.

## 2024-10-03 ENCOUNTER — CLINICAL SUPPORT (OUTPATIENT)
Dept: REHABILITATION | Facility: HOSPITAL | Age: 44
End: 2024-10-03
Payer: COMMERCIAL

## 2024-10-03 DIAGNOSIS — G89.29 CHRONIC MIDLINE LOW BACK PAIN WITH LEFT-SIDED SCIATICA: Primary | ICD-10-CM

## 2024-10-03 DIAGNOSIS — M54.42 CHRONIC MIDLINE LOW BACK PAIN WITH LEFT-SIDED SCIATICA: Primary | ICD-10-CM

## 2024-10-03 PROCEDURE — 97112 NEUROMUSCULAR REEDUCATION: CPT | Mod: CQ

## 2024-10-03 PROCEDURE — 97110 THERAPEUTIC EXERCISES: CPT | Mod: CQ

## 2024-10-03 NOTE — PROGRESS NOTES
[facility]   Physical Therapy Progress and Discharge Note     Name: Ashly Trent  Clinic Number: [id number]    Therapy Diagnosis:   Encounter Diagnosis   Name Primary?    Chronic midline low back pain with left-sided sciatica Yes     Physician: Rosaura Manzanares FNP    Visit Date: 10/3/2024    Physician Orders: PT Eval and Treat  Medical Diagnosis from Referral: M54.42,G89.29 (ICD-10-CM) - Chronic midline low back pain with left-sided sciatica  Evaluation Date: 8/13/2024  Authorization Period Expiration: 11/13/2024  Plan of Care Expiration: 10/4/2024  Visit # / Visits authorized: 13/13   FOTO: 1 = 46   2 = 59   3 = 57 @ DC    PTA Visit #: 2/5     Time In: 1:27 pm  Time Out:  2:08 pm  Total Billable Time:   41 billable minutes    Subjective     Pt reports: Her back is feeling better today    She was compliant with home exercise program.  Response to previous treatment: no complaints   Functional change: no change noted    Pain: 2/10  Location: low back      Objective      Thoracolumbar range of motion:    AROM Pain/Dysfunction with Movement   Flexion WFL  pain @ end range   Extension WFL     Right lateral flexion WFL     Left lateral flexion WFL     Right rotation WFL     Left rotation WFL        Manual muscle testing:    Right Left   Hip flexion 4+/5 5/5   Hip abduction 5/5 5/5   Knee extension 5/5 5/5   Knee flexion  5/5 5/5   Ankle dorsiflexion 5/5 5/5   Ankle plantar flexion  5/5 5/5      Hip/knee range of motion:    Right Left    Hip flexion (120) 110 110   Hip extension WFL WFL   Hip abduction WFL WFL   Hip adduction WFL WFL   Internal rotation (45) 25 25   External rotation (45) WFL WFL   Hamstring 90/90 (-10) -10 -20   Knee extension WFL WFL   Knee flexion (120) WFL WFL      Treatment     Ashly received the treatments listed below:      therapeutic exercises to develop strength, endurance, ROM, flexibility, posture, and core stabilization for 16 minutes  including:    NuStep: x 5 minutes  Calf stretch on slant board: 2 minutes  Supine piriformis stretch: 3 x 20 second holds  Lower trunk rotation: x 5 reps with 10 second holds each  Leg press (bilateral): 5 plates; x 20 reps    Seated figure 4 stretch: 3 x 20 second holds (not performed)     manual therapy techniques: Joint mobilizations, Manual traction, Myofacial release, Soft tissue Mobilization, and Friction Massage were applied to the low back for 0 minutes, including:  -     neuromuscular re-education activities to improve: Balance, Coordination, Kinesthetic Sense, Proprioception, and Posture for 25 minutes. The following activities were included:    Supine posterior pelvic tilts: x 20 rep with 3 second holds  Supine posterior pelvic tilts with bridges with abduction: silver theraband: x 20 reps  Supine posterior pelvic tilts with alternating marching: silver theraband; x 20 reps  Standing core stabilizations with alternating marching in squat rack with black tubing: x 20 reps each  Paloff press: blue cord; 2 x 10 reps each direction  Stir the pot: blue cord; 3 x 10 reps each direction  Cybex lumbar extensions: 3 plates; x 20 reps    (Not Today)  Cybex hip ext--2 plates--2 x 10 (not performed)   SL hypers leaning on mat--2 x 10 (not performed)     Supine ball hugs: x 20 reps with 3 second holds (not performed)     therapeutic activities to improve functional performance for 0 minutes, including:    (not performed)     Patient Education and Home Exercises       Education provided: Patient educated on the importance of completing skilled physical therapy treatment and home exercise program as prescribed to maximize functional gains.     Written Home Exercises Provided: Patient instructed to cont prior HEP. Exercises were reviewed and Ashly was able to demonstrate them prior to the end of the session. Ashly demonstrated good understanding of the education provided. See EMR under Patient Instructions for  exercises provided during therapy sessions    Assessment     At Evaluation:  Ashly is a 43 y.o. female referred to outpatient physical therapy with a medical diagnosis of M54.42,G89.29 (ICD-10-CM) - Chronic midline low back pain with left-sided sciatica. Pt presents to PT with low back pain with left lower extremity radiculopathy, decreased lumbar flexion, range of motion, left greater than right lower extremity weakness, abnormal thoracolumbar posture, and abnormal gait mechanics. Patient may benefit from further diagnostic imaging should her symptoms not resolve with skilled physical therapy treatment.    Current Assessment:  Ashly arrived with little pain today.  She still has pain with prolonged standing.  She has made significant gains in ROM and LE strength.  Continues to have posterior chain and core strength deficits that she will continue to work on with her HEP.  Patient with good effort throughout treatment but with noted fatigue. Time billed reflects time spent 1:1 with patient.Treatment and objective measurements performed by Jg Gonsalez PTA but final assessment performed by Garrett Burk DPT. Will DC today     Anticipated barriers to physical therapy: none    Goals:     Short Term Goals:  Patient will demonstrate independence with home exercise program to ensure carryover of treatment. --MET  Patient will demonstrate 35 degrees of thoracolumbar flexion active range of motion to improve functional use of the thoracolumbar spine. --See Objective--MET  Patient will improve bilateral lower extremity strength to 4+/5 to improve independence and safety with bed mobility, transfers, and gait. --See Objective--MET  Patient will ambulate 150 feet without assistive device with independence to improve independence and safety with gait. --MET  Patient will report a reduction in low back and left lower extremity pain from 10/10 to 8/10 at worst to improve quality of life. --MET     Long Term Goals:  Patient  will demonstrate 45 degrees of thoracolumbar flexion active range of motion to improve functional use of the thoracolumbar spine. --See Objective--MET  Patient will improve bilateral lower extremity strength to 5/5 to improve independence and safety with bed mobility, transfers, and gait. -See Objective--NOT MET  Patient will ambulate 300 feet without an assistive device with independence with normal gait mechanics including up/down curbs and ramps to improve independence and safety with community ambulation--MET  Patient will report a reduction in low back and left lower extremity pain from 10/10 to 6/10 at worst to improve quality of life. --7/10 with prolonged standing--NOT MET     Plan     Ashly will be DC today with ADOLFO Gonsalez, PTA  10/03/2024

## 2024-10-11 ENCOUNTER — OFFICE VISIT (OUTPATIENT)
Dept: FAMILY MEDICINE | Facility: CLINIC | Age: 44
End: 2024-10-11
Payer: COMMERCIAL

## 2024-10-11 VITALS
DIASTOLIC BLOOD PRESSURE: 81 MMHG | HEART RATE: 102 BPM | WEIGHT: 290 LBS | HEIGHT: 66 IN | TEMPERATURE: 98 F | OXYGEN SATURATION: 99 % | BODY MASS INDEX: 46.61 KG/M2 | SYSTOLIC BLOOD PRESSURE: 136 MMHG

## 2024-10-11 DIAGNOSIS — E78.2 MIXED HYPERLIPIDEMIA: ICD-10-CM

## 2024-10-11 DIAGNOSIS — I10 PRIMARY HYPERTENSION: ICD-10-CM

## 2024-10-11 DIAGNOSIS — Z23 NEED FOR VACCINATION: ICD-10-CM

## 2024-10-11 DIAGNOSIS — E11.9 TYPE 2 DIABETES MELLITUS WITHOUT COMPLICATION, WITH LONG-TERM CURRENT USE OF INSULIN: Primary | ICD-10-CM

## 2024-10-11 DIAGNOSIS — Z79.4 TYPE 2 DIABETES MELLITUS WITHOUT COMPLICATION, WITH LONG-TERM CURRENT USE OF INSULIN: Primary | ICD-10-CM

## 2024-10-11 LAB
EST. AVERAGE GLUCOSE BLD GHB EST-MCNC: 280 MG/DL
HBA1C MFR BLD HPLC: 11.4 % (ref 4.5–6.6)

## 2024-10-11 NOTE — PROGRESS NOTES
Subjective     Patient ID: Ashly Trent is a 44 y.o. female.    Chief Complaint: 3 Month Fu for Type 2 diabetes mellitus    Pt presents for a diabetes follow-up.       Review of Systems   Constitutional:  Negative for activity change, appetite change, chills, fatigue and fever.   HENT:  Negative for nasal congestion, ear discharge, nosebleeds, postnasal drip, rhinorrhea, sinus pressure/congestion, sneezing, sore throat and tinnitus.    Eyes:  Negative for pain, discharge, redness and itching.   Respiratory:  Negative for cough, choking, chest tightness, shortness of breath and wheezing.    Cardiovascular:  Negative for chest pain.   Gastrointestinal:  Negative for abdominal distention, abdominal pain, blood in stool, change in bowel habit, constipation, diarrhea, nausea and vomiting.   Genitourinary:  Negative for decreased urine volume, dysuria, flank pain and frequency.   Musculoskeletal:  Negative for back pain and gait problem.   Integumentary:  Negative for wound, breast mass and breast discharge.   Allergic/Immunologic: Negative for immunocompromised state.   Neurological:  Negative for dizziness, light-headedness and headaches.   Psychiatric/Behavioral:  Negative for agitation, behavioral problems and hallucinations.    Breast: Negative for mass         Objective     Physical Exam  Vitals and nursing note reviewed.   Constitutional:       Appearance: Normal appearance.   Cardiovascular:      Rate and Rhythm: Normal rate and regular rhythm.      Heart sounds: Normal heart sounds.   Pulmonary:      Effort: Pulmonary effort is normal.      Breath sounds: Normal breath sounds.   Musculoskeletal:         General: Normal range of motion.   Neurological:      Mental Status: She is alert and oriented to person, place, and time.   Psychiatric:         Mood and Affect: Mood normal.         Behavior: Behavior normal.            Assessment and Plan     1. Type 2 diabetes mellitus without complication, with long-term  current use of insulin  -     Hemoglobin A1C; Future; Expected date: 10/11/2024    2. Mixed hyperlipidemia    3. Primary hypertension  Overview:  Formatting of this note might be different from the original.  LOSARTAN 25MG DAILY      4. Need for vaccination  -     influenza (Flulaval, Fluzone, Fluarix) 45 mcg/0.5 mL IM vaccine (> or = 6 mo) 0.5 mL        Will call pt with lab results.          Follow up in about 3 months (around 1/11/2025).

## 2024-10-14 ENCOUNTER — PATIENT MESSAGE (OUTPATIENT)
Dept: FAMILY MEDICINE | Facility: CLINIC | Age: 44
End: 2024-10-14
Payer: COMMERCIAL

## 2024-10-14 DIAGNOSIS — Z79.4 TYPE 2 DIABETES MELLITUS WITHOUT COMPLICATION, WITH LONG-TERM CURRENT USE OF INSULIN: Primary | ICD-10-CM

## 2024-10-14 DIAGNOSIS — E11.9 TYPE 2 DIABETES MELLITUS WITHOUT COMPLICATION, WITH LONG-TERM CURRENT USE OF INSULIN: Primary | ICD-10-CM

## 2024-10-14 RX ORDER — SEMAGLUTIDE 1.34 MG/ML
1 INJECTION, SOLUTION SUBCUTANEOUS
Qty: 9 ML | Refills: 3 | Status: SHIPPED | OUTPATIENT
Start: 2024-10-14

## 2024-10-23 ENCOUNTER — OFFICE VISIT (OUTPATIENT)
Dept: PAIN MEDICINE | Facility: CLINIC | Age: 44
End: 2024-10-23
Payer: COMMERCIAL

## 2024-10-23 VITALS
SYSTOLIC BLOOD PRESSURE: 151 MMHG | HEART RATE: 97 BPM | HEIGHT: 66 IN | WEIGHT: 292 LBS | RESPIRATION RATE: 18 BRPM | BODY MASS INDEX: 46.93 KG/M2 | DIASTOLIC BLOOD PRESSURE: 94 MMHG

## 2024-10-23 DIAGNOSIS — M54.9 DORSALGIA, UNSPECIFIED: Chronic | ICD-10-CM

## 2024-10-23 DIAGNOSIS — M54.17 LUMBOSACRAL RADICULOPATHY: Primary | Chronic | ICD-10-CM

## 2024-10-23 DIAGNOSIS — M54.42 CHRONIC MIDLINE LOW BACK PAIN WITH LEFT-SIDED SCIATICA: Chronic | ICD-10-CM

## 2024-10-23 DIAGNOSIS — G89.29 CHRONIC MIDLINE LOW BACK PAIN WITH LEFT-SIDED SCIATICA: Chronic | ICD-10-CM

## 2024-10-23 PROCEDURE — 3066F NEPHROPATHY DOC TX: CPT | Mod: CPTII,,, | Performed by: PAIN MEDICINE

## 2024-10-23 PROCEDURE — 3077F SYST BP >= 140 MM HG: CPT | Mod: CPTII,,, | Performed by: PAIN MEDICINE

## 2024-10-23 PROCEDURE — 3008F BODY MASS INDEX DOCD: CPT | Mod: CPTII,,, | Performed by: PAIN MEDICINE

## 2024-10-23 PROCEDURE — 3046F HEMOGLOBIN A1C LEVEL >9.0%: CPT | Mod: CPTII,,, | Performed by: PAIN MEDICINE

## 2024-10-23 PROCEDURE — 3060F POS MICROALBUMINURIA REV: CPT | Mod: CPTII,,, | Performed by: PAIN MEDICINE

## 2024-10-23 PROCEDURE — 99999 PR PBB SHADOW E&M-EST. PATIENT-LVL V: CPT | Mod: PBBFAC,,, | Performed by: PAIN MEDICINE

## 2024-10-23 PROCEDURE — 99214 OFFICE O/P EST MOD 30 MIN: CPT | Mod: S$PBB,,, | Performed by: PAIN MEDICINE

## 2024-10-23 PROCEDURE — 99215 OFFICE O/P EST HI 40 MIN: CPT | Mod: PBBFAC | Performed by: PAIN MEDICINE

## 2024-10-23 PROCEDURE — 4010F ACE/ARB THERAPY RXD/TAKEN: CPT | Mod: CPTII,,, | Performed by: PAIN MEDICINE

## 2024-10-23 PROCEDURE — 3080F DIAST BP >= 90 MM HG: CPT | Mod: CPTII,,, | Performed by: PAIN MEDICINE

## 2024-10-23 NOTE — PROGRESS NOTES
She Disclaimer: This note has been generated using voice-recognition software. There may be typographical errors that have been missed during proof-reading        Patient ID: Ashly Trent is a 44 y.o. female.      Chief Complaint: Low-back Pain      44-year-old female returns for re-evaluation following MRI of the lumbar spine.  Results were reviewed and discussed with patient.  She has lower back pain with radicular symptoms to the left lower extremity and calf.  She describes the pain as burning.  Physical therapy failed to provide relief.  She notes minimal benefit with NSAIDs and muscle relaxants.  She describes weakness but denies bowel or bladder involvement.  L5-S1 epidural steroid injection for lumbosacral radiculopathy was recommended and discussed with patient                  Pain Assessment  Pain Assessment: 0-10  Pain Score:   5  Pain Location: Back  Pain Descriptors: Sharp  Pain Frequency: Intermittent  Pain Onset: Gradual  Clinical Progression: Gradually improving  Aggravating Factors: Standing  Pain Intervention(s): Medication (See eMAR), Home medication      A's of Opioid Risk Assessment  Activity:Patient has difficulty performing ADL.   Analgesia:Patients pain is not controlled by current medication.   Adverse Effects: Patient denies constipation or sedation.  Aberrant Behavior:  reviewed with no aberrant drug seeking/taking behavior.      Patient denies any suicidal or homicidal ideations    Physical Therapy/Home Exercise: yes, completed 6 weeks without significant improvement    MRI Lumbar Spine Without Contrast  Narrative: EXAMINATION:  MRI LUMBAR SPINE WITHOUT CONTRAST    CLINICAL HISTORY:  Low back pain, progressive neurologic deficit;Low back pain, symptoms persist with > 6wks conservative treatment;Lumbar radiculopathy, symptoms persist with conservative treatment; Dorsalgia, unspecified    TECHNIQUE:  Multiplanar, multisequence MR images were acquired from the thoracolumbar junction  to the sacrum without the administration of contrast.    COMPARISON:  X-ray 224, CT 11/19/2014    FINDINGS:  Alignment: Normal.    Vertebrae: Normal marrow signal. No fracture.    Discs: Mild-moderate degenerative disc space narrowing and desiccation at L5-S1.    Cord: Normal.  Conus terminates at L1    Degenerative findings:    T12-L1: No significant abnormality.    L1-L2: No significant abnormality.    L2-L3: No significant abnormality.    L3-L4: No significant abnormality.    L4-L5: Mild disc bulge with right paracentral disc protrusion.  Mild facet arthropathy.  No significant central canal or foraminal narrowing.    L5-S1: There is diffuse disc protrusion more prominent/moderate on the left with involvement of the left neural foramen.  Mild central canal narrowing.  Moderate left foraminal narrowing.  This could affect descending bilateral S1 nerve roots left greater than right or possible exiting left L5 nerve root.  Recommend clinical correlation.    Paraspinal muscles & soft tissues: Unremarkable.  Impression: 1. No acute abnormality  2. Degenerative changes of the lumbar spine predominantly at L5-S1 with associated disc protrusion more prominent on the left.  See above comments.  Follow-up recommended.  3. This report was flagged in Epic as abnormal.    Electronically signed by: Mushtaq Lake  Date:    10/19/2024  Time:    00:28      Review of Systems   Constitutional: Negative.    HENT: Negative.     Eyes: Negative.    Respiratory: Negative.     Cardiovascular: Negative.    Gastrointestinal: Negative.    Endocrine: Negative.    Genitourinary: Negative.    Musculoskeletal:  Positive for arthralgias, back pain and leg pain (Left lower extremity).   Integumentary:  Negative.   Neurological:  Positive for numbness (Left lower extremity).   Hematological: Negative.    Psychiatric/Behavioral: Negative.               Past Medical History:   Diagnosis Date    Acid reflux     Diabetes mellitus, type 2     HTN  (hypertension)     Hyperlipidemia     Migraine with aura     Obesity      Past Surgical History:   Procedure Laterality Date    CHOLECYSTECTOMY      TONSILLECTOMY       Social History     Socioeconomic History    Marital status: Single   Tobacco Use    Smoking status: Never    Smokeless tobacco: Never   Substance and Sexual Activity    Alcohol use: Yes    Drug use: Not Currently    Sexual activity: Yes     Social Drivers of Health     Financial Resource Strain: Low Risk  (10/4/2024)    Overall Financial Resource Strain (CARDIA)     Difficulty of Paying Living Expenses: Not very hard   Food Insecurity: No Food Insecurity (10/4/2024)    Hunger Vital Sign     Worried About Running Out of Food in the Last Year: Never true     Ran Out of Food in the Last Year: Never true   Transportation Needs: No Transportation Needs (8/9/2024)    Received from [facility]    PRAPARE - Transportation     Lack of Transportation (Medical): No     Lack of Transportation (Non-Medical): No   Physical Activity: Insufficiently Active (10/4/2024)    Exercise Vital Sign     Days of Exercise per Week: 5 days     Minutes of Exercise per Session: 10 min   Stress: No Stress Concern Present (10/4/2024)    [employer] - Occupational Stress Questionnaire     Feeling of Stress : Not at all   Housing Stability: Unknown (10/4/2024)    Housing Stability Vital Sign     Unable to Pay for Housing in the Last Year: No     Family History   Problem Relation Name Age of Onset    Asthma Mother      Diabetes Mother      Hypertension Mother      Hypertension Father      Diabetes Paternal Grandmother      Heart disease Paternal Grandfather      Hypertension Sister      No Known Problems Brother      No Known Problems Brother       Review of patient's allergies indicates:   Allergen Reactions    Amoxicillin Rash     has a current medication list which includes the following prescription(s): amlodipine,  ergocalciferol, halobetasol, norethindrone, pen needle, diabetic, ozempic, atorvastatin, bd ultra-fine colleen pen needle, dapagliflozin propanediol, insulin degludec, losartan, and norethindrone.      Objective:  Vitals:    10/23/24 1325   BP: (!) 151/94   Pulse: 97   Resp: 18        Physical Exam  Vitals and nursing note reviewed. Chaperone present: Accompanied by her mother.   Constitutional:       General: She is not in acute distress.     Appearance: Normal appearance. She is not ill-appearing, toxic-appearing or diaphoretic.   HENT:      Head: Normocephalic and atraumatic.      Nose: Nose normal.      Mouth/Throat:      Mouth: Mucous membranes are moist.   Eyes:      Extraocular Movements: Extraocular movements intact.      Pupils: Pupils are equal, round, and reactive to light.   Cardiovascular:      Rate and Rhythm: Normal rate and regular rhythm.      Heart sounds: Normal heart sounds.   Pulmonary:      Effort: Pulmonary effort is normal. No respiratory distress.      Breath sounds: Normal breath sounds. No stridor. No wheezing or rhonchi.   Abdominal:      General: Bowel sounds are normal.      Palpations: Abdomen is soft.   Musculoskeletal:         General: No swelling or deformity.      Cervical back: Normal and normal range of motion. No spasms or tenderness. No pain with movement. Normal range of motion.      Thoracic back: Normal.      Lumbar back: Tenderness and bony tenderness present. No spasms. Decreased range of motion. Positive left straight leg raise test. Negative right straight leg raise test. No scoliosis.      Right lower leg: No edema.      Left lower leg: No edema.      Comments: Lumbar pain with flexion, extension and lateral rotation.   Skin:     General: Skin is warm.   Neurological:      General: No focal deficit present.      Mental Status: She is alert and oriented to person, place, and time. Mental status is at baseline.      Cranial Nerves: No cranial nerve deficit.      Sensory:  Sensation is intact. No sensory deficit.      Motor: No weakness.      Coordination: Coordination normal.      Gait: Gait abnormal.      Deep Tendon Reflexes: Reflexes are normal and symmetric.   Psychiatric:         Mood and Affect: Mood normal.         Behavior: Behavior normal.           Assessment:      1. Lumbosacral radiculopathy    2. Dorsalgia, unspecified    3. Chronic midline low back pain with left-sided sciatica            Plan:  1. reviewed  2.Schedule L5-S1 OMAR and epiduralgram for lumbosacral radiculopathy    Orders Placed This Encounter   Procedures    Case Request Operating Room: L5-S1 OMRA     Order Specific Question:   Medical Necessity:     Answer:   Medically Non-Urgent [100]     Order Specific Question:   Case classification     Answer:   E - Elective [90]     Order Specific Question:   Positioning:     Answer:   Prone [1003]     Order Specific Question:   Post-Procedure Disposition:     Answer:   PACU [1]     Order Specific Question:   Estimated Length of Stay:     Answer:   0 midnight     Order Specific Question:   Implant Required:     Answer:   No [1001]     Order Specific Question:   Is an on-site pathologist required for this procedure?     Answer:   N/A      3. Indications for this procedure for this specific patient include the following   -History, physical examination and concordant radiological image based diagnostic testing supports medical necessity for an epidural steroid injection  - neurogenic claudication due to central disc pathology, osteophyte complexes, severe degenerative disc disease producing foraminal or central stenosis  - Pt has been in pain for at least 6 weeks and has failed conservative care (e.g. Exercise, physical methods, NSAID/ and or muscle relaxants)   - Pt has no major risk factors for spinal cancer or contraindicated condition   - Neurogenic claudication and Radicular pain are interfering with functional activity  - Pain is associated with symptoms of  nerve root irritation   - Any numbness documented is accompanied with paresthesia   - No evidence of systemic or local infection, bleeding tendency or unstable medical condition   - Epidural provided as part of a comprehensive pain management program  - All repeat injections have at least 80% pain relief and increase functional gain and physical activity, and reduction in reliance on the use of medication and or physical therapy  - Pt has significant functional limitation resulting in diminished quality of life and impaired age appropriate ADL's.   - Diagnostic evaluation has ruled out other causes of pain  - Pt participating in an active rehabilitation program or home exercise program which has been discussed with the patient including heat ice and rest  - No more than 3 epidurals will be done in a 6 month period at the same level with at least 7 days between injections  - MAC is only offered in cases of needle phobia   - Injection done at L5-S1 level which is consistent with patient's dermatomal pain complaint    4.Monitored Anesthesia Care medical necessity authorization request:    Monitor anesthesia request is medically indicated for the scheduled nerve block procedure due to:  - needle phobia and anxiety, placing  the patient at risk during the provided service.  -patient has a BMI greater than 45  -patient has an ASA class greater than 3 and requires constant presence of an anesthesiologist during the procedure:  -patient has severe problems with muscles and muscle spasticity that makes it hard to lie still  -patient suffers from chronic pain and is unable to function due to  diminished ADLs    5.The planned medically necessary  surgical procedure is performed in a hospital outpatient department and not in an ambulatory surgical center due to:     -there is no geographically assessable ambulatory surgery center that has the  necessary equipment and fluoroscopy needed for the procedure     -there is no  geographically assessable ambulatory surgical center available at which the physician has privileges     -an ASC's  specific  guideline regarding the individuals weight or health conditions that prevent the use of an ASC       -injections must be performed under fluoroscopy image guidance with contrast unless the patient has a documented contrast allergy or pregnancy          report:  Reviewed and consistent with medication use as prescribed.      The total time spent for evaluation and management on 10/25/2024 including reviewing separately obtained history, performing a medically appropriate exam and evaluation, documenting clinical information in the health record, independently interpreting results and communicating them to the patient/family/caregiver, and ordering medications/tests/procedures was between 15-29 minutes.    The above plan and management options were discussed at length with patient. Patient is in agreement with the above and verbalized understanding. It will be communicated with the referring physician via electronic record, fax, or mail.

## 2024-10-23 NOTE — PATIENT INSTRUCTIONS
YOU ARE SCHEDULED ON 11/19/2024 AT 10:30 AM FOR YOUR PROCEDURE- L5-S1 OMAR WITH .      STOP OZEMPIC FOR 7 FULL DAYS BEFORE PROCEDURE.        Procedure Instructions:    Nothing to eat or drink for 8 hours or after midnight including gum, candy, mints, or tobacco products.  If you are scheduled for 1:30 or later nothing to eat or drink after 5 a.m. the morning of the procedure, including gum, candy, mints, or tobacco products.  Must have a  at least 18 yrs of age to stay present at all times  No Diabetic medications the morning of procedure, check blood sugar the morning of procedure, if it is greater than 200 call the office at [phone]  If you are started on antibiotics or have been prescribed antibiotics, have a fever, or have any other type of infection call to reschedule procedure.  If you take blood pressure medications you can take it at your regular scheduled time with a small sip of WATER!  Dentures are to be removed prior to procedure or we can provide you with a denture cup to remove them prior to being taken back for procedure.   False eyelashes are to be removed before the morning of procedure.    Contacts will have to be removed prior to procedure.  No jewelry is to be worn to the procedure.     HOLD ASPIRIN AND ASPIRIN PRODUCTS  (ASPIRIN, BC POWDER ETC. ) FOR 7 DAYS  PRIOR TO PROCEDURE  HOLD NSAIDS( ibuprofen, mobic, meloxicam, advil, diclofenac, naproxen, relafen, celebrex,  methotrexate, aleve etc....)  FOR 3 DAYS   PRIOR TO PROCEDURE      SIGNATURE:______________________________________________________________________________________________________   [] : No [FreeTextEntry2] : Infection Prevention Foot and nail care Nutrition and wound healing Pain management   [FreeTextEntry4] : C+S result reviewed by HERNANDO Enrique in progress Patient has supplies at home and preforms his own dressing changes.  follow up in 1 week

## 2024-10-23 NOTE — H&P (VIEW-ONLY)
She Disclaimer: This note has been generated using voice-recognition software. There may be typographical errors that have been missed during proof-reading        Patient ID: Ashly Trent is a 44 y.o. female.      Chief Complaint: Low-back Pain      44-year-old female returns for re-evaluation following MRI of the lumbar spine.  Results were reviewed and discussed with patient.  She has lower back pain with radicular symptoms to the left lower extremity and calf.  She describes the pain as burning.  Physical therapy failed to provide relief.  She notes minimal benefit with NSAIDs and muscle relaxants.  She describes weakness but denies bowel or bladder involvement.  L5-S1 epidural steroid injection for lumbosacral radiculopathy was recommended and discussed with patient                  Pain Assessment  Pain Assessment: 0-10  Pain Score:   5  Pain Location: Back  Pain Descriptors: Sharp  Pain Frequency: Intermittent  Pain Onset: Gradual  Clinical Progression: Gradually improving  Aggravating Factors: Standing  Pain Intervention(s): Medication (See eMAR), Home medication      A's of Opioid Risk Assessment  Activity:Patient has difficulty performing ADL.   Analgesia:Patients pain is not controlled by current medication.   Adverse Effects: Patient denies constipation or sedation.  Aberrant Behavior:  reviewed with no aberrant drug seeking/taking behavior.      Patient denies any suicidal or homicidal ideations    Physical Therapy/Home Exercise: yes, completed 6 weeks without significant improvement    MRI Lumbar Spine Without Contrast  Narrative: EXAMINATION:  MRI LUMBAR SPINE WITHOUT CONTRAST    CLINICAL HISTORY:  Low back pain, progressive neurologic deficit;Low back pain, symptoms persist with > 6wks conservative treatment;Lumbar radiculopathy, symptoms persist with conservative treatment; Dorsalgia, unspecified    TECHNIQUE:  Multiplanar, multisequence MR images were acquired from the thoracolumbar junction  to the sacrum without the administration of contrast.    COMPARISON:  X-ray 224, CT 11/19/2014    FINDINGS:  Alignment: Normal.    Vertebrae: Normal marrow signal. No fracture.    Discs: Mild-moderate degenerative disc space narrowing and desiccation at L5-S1.    Cord: Normal.  Conus terminates at L1    Degenerative findings:    T12-L1: No significant abnormality.    L1-L2: No significant abnormality.    L2-L3: No significant abnormality.    L3-L4: No significant abnormality.    L4-L5: Mild disc bulge with right paracentral disc protrusion.  Mild facet arthropathy.  No significant central canal or foraminal narrowing.    L5-S1: There is diffuse disc protrusion more prominent/moderate on the left with involvement of the left neural foramen.  Mild central canal narrowing.  Moderate left foraminal narrowing.  This could affect descending bilateral S1 nerve roots left greater than right or possible exiting left L5 nerve root.  Recommend clinical correlation.    Paraspinal muscles & soft tissues: Unremarkable.  Impression: 1. No acute abnormality  2. Degenerative changes of the lumbar spine predominantly at L5-S1 with associated disc protrusion more prominent on the left.  See above comments.  Follow-up recommended.  3. This report was flagged in Epic as abnormal.    Electronically signed by: Mushtaq Lake  Date:    10/19/2024  Time:    00:28      Review of Systems   Constitutional: Negative.    HENT: Negative.     Eyes: Negative.    Respiratory: Negative.     Cardiovascular: Negative.    Gastrointestinal: Negative.    Endocrine: Negative.    Genitourinary: Negative.    Musculoskeletal:  Positive for arthralgias, back pain and leg pain (Left lower extremity).   Integumentary:  Negative.   Neurological:  Positive for numbness (Left lower extremity).   Hematological: Negative.    Psychiatric/Behavioral: Negative.               Past Medical History:   Diagnosis Date    Acid reflux     Diabetes mellitus, type 2     HTN  (hypertension)     Hyperlipidemia     Migraine with aura     Obesity      Past Surgical History:   Procedure Laterality Date    CHOLECYSTECTOMY      TONSILLECTOMY       Social History     Socioeconomic History    Marital status: Single   Tobacco Use    Smoking status: Never    Smokeless tobacco: Never   Substance and Sexual Activity    Alcohol use: Yes    Drug use: Not Currently    Sexual activity: Yes     Social Drivers of Health     Financial Resource Strain: Low Risk  (10/4/2024)    Overall Financial Resource Strain (CARDIA)     Difficulty of Paying Living Expenses: Not very hard   Food Insecurity: No Food Insecurity (10/4/2024)    Hunger Vital Sign     Worried About Running Out of Food in the Last Year: Never true     Ran Out of Food in the Last Year: Never true   Transportation Needs: No Transportation Needs (8/9/2024)    Received from [facility]    PRAPARE - Transportation     Lack of Transportation (Medical): No     Lack of Transportation (Non-Medical): No   Physical Activity: Insufficiently Active (10/4/2024)    Exercise Vital Sign     Days of Exercise per Week: 5 days     Minutes of Exercise per Session: 10 min   Stress: No Stress Concern Present (10/4/2024)    Citizen of [place] of Occupational [employer] - Occupational Stress Questionnaire     Feeling of Stress : Not at all   Housing Stability: Unknown (10/4/2024)    Housing Stability Vital Sign     Unable to Pay for Housing in the Last Year: No     Family History   Problem Relation Name Age of Onset    Asthma Mother      Diabetes Mother      Hypertension Mother      Hypertension Father      Diabetes Paternal Grandmother      Heart disease Paternal Grandfather      Hypertension Sister      No Known Problems Brother      No Known Problems Brother       Review of patient's allergies indicates:   Allergen Reactions    Amoxicillin Rash     has a current medication list which includes the following prescription(s): amlodipine,  ergocalciferol, halobetasol, norethindrone, pen needle, diabetic, ozempic, atorvastatin, bd ultra-fine colleen pen needle, dapagliflozin propanediol, insulin degludec, losartan, and norethindrone.      Objective:  Vitals:    10/23/24 1325   BP: (!) 151/94   Pulse: 97   Resp: 18        Physical Exam  Vitals and nursing note reviewed. Chaperone present: Accompanied by her mother.   Constitutional:       General: She is not in acute distress.     Appearance: Normal appearance. She is not ill-appearing, toxic-appearing or diaphoretic.   HENT:      Head: Normocephalic and atraumatic.      Nose: Nose normal.      Mouth/Throat:      Mouth: Mucous membranes are moist.   Eyes:      Extraocular Movements: Extraocular movements intact.      Pupils: Pupils are equal, round, and reactive to light.   Cardiovascular:      Rate and Rhythm: Normal rate and regular rhythm.      Heart sounds: Normal heart sounds.   Pulmonary:      Effort: Pulmonary effort is normal. No respiratory distress.      Breath sounds: Normal breath sounds. No stridor. No wheezing or rhonchi.   Abdominal:      General: Bowel sounds are normal.      Palpations: Abdomen is soft.   Musculoskeletal:         General: No swelling or deformity.      Cervical back: Normal and normal range of motion. No spasms or tenderness. No pain with movement. Normal range of motion.      Thoracic back: Normal.      Lumbar back: Tenderness and bony tenderness present. No spasms. Decreased range of motion. Positive left straight leg raise test. Negative right straight leg raise test. No scoliosis.      Right lower leg: No edema.      Left lower leg: No edema.      Comments: Lumbar pain with flexion, extension and lateral rotation.   Skin:     General: Skin is warm.   Neurological:      General: No focal deficit present.      Mental Status: She is alert and oriented to person, place, and time. Mental status is at baseline.      Cranial Nerves: No cranial nerve deficit.      Sensory:  Sensation is intact. No sensory deficit.      Motor: No weakness.      Coordination: Coordination normal.      Gait: Gait abnormal.      Deep Tendon Reflexes: Reflexes are normal and symmetric.   Psychiatric:         Mood and Affect: Mood normal.         Behavior: Behavior normal.           Assessment:      1. Lumbosacral radiculopathy    2. Dorsalgia, unspecified    3. Chronic midline low back pain with left-sided sciatica            Plan:  1. reviewed  2.Schedule L5-S1 OMAR and epiduralgram for lumbosacral radiculopathy    Orders Placed This Encounter   Procedures    Case Request Operating Room: L5-S1 OMAR     Order Specific Question:   Medical Necessity:     Answer:   Medically Non-Urgent [100]     Order Specific Question:   Case classification     Answer:   E - Elective [90]     Order Specific Question:   Positioning:     Answer:   Prone [1003]     Order Specific Question:   Post-Procedure Disposition:     Answer:   PACU [1]     Order Specific Question:   Estimated Length of Stay:     Answer:   0 midnight     Order Specific Question:   Implant Required:     Answer:   No [1001]     Order Specific Question:   Is an on-site pathologist required for this procedure?     Answer:   N/A      3. Indications for this procedure for this specific patient include the following   -History, physical examination and concordant radiological image based diagnostic testing supports medical necessity for an epidural steroid injection  - neurogenic claudication due to central disc pathology, osteophyte complexes, severe degenerative disc disease producing foraminal or central stenosis  - Pt has been in pain for at least 6 weeks and has failed conservative care (e.g. Exercise, physical methods, NSAID/ and or muscle relaxants)   - Pt has no major risk factors for spinal cancer or contraindicated condition   - Neurogenic claudication and Radicular pain are interfering with functional activity  - Pain is associated with symptoms of  nerve root irritation   - Any numbness documented is accompanied with paresthesia   - No evidence of systemic or local infection, bleeding tendency or unstable medical condition   - Epidural provided as part of a comprehensive pain management program  - All repeat injections have at least 80% pain relief and increase functional gain and physical activity, and reduction in reliance on the use of medication and or physical therapy  - Pt has significant functional limitation resulting in diminished quality of life and impaired age appropriate ADL's.   - Diagnostic evaluation has ruled out other causes of pain  - Pt participating in an active rehabilitation program or home exercise program which has been discussed with the patient including heat ice and rest  - No more than 3 epidurals will be done in a 6 month period at the same level with at least 7 days between injections  - MAC is only offered in cases of needle phobia   - Injection done at L5-S1 level which is consistent with patient's dermatomal pain complaint    4.Monitored Anesthesia Care medical necessity authorization request:    Monitor anesthesia request is medically indicated for the scheduled nerve block procedure due to:  - needle phobia and anxiety, placing  the patient at risk during the provided service.  -patient has a BMI greater than 45  -patient has an ASA class greater than 3 and requires constant presence of an anesthesiologist during the procedure:  -patient has severe problems with muscles and muscle spasticity that makes it hard to lie still  -patient suffers from chronic pain and is unable to function due to  diminished ADLs    5.The planned medically necessary  surgical procedure is performed in a hospital outpatient department and not in an ambulatory surgical center due to:     -there is no geographically assessable ambulatory surgery center that has the  necessary equipment and fluoroscopy needed for the procedure     -there is no  geographically assessable ambulatory surgical center available at which the physician has privileges     -an ASC's  specific  guideline regarding the individuals weight or health conditions that prevent the use of an ASC       -injections must be performed under fluoroscopy image guidance with contrast unless the patient has a documented contrast allergy or pregnancy          report:  Reviewed and consistent with medication use as prescribed.      The total time spent for evaluation and management on 10/25/2024 including reviewing separately obtained history, performing a medically appropriate exam and evaluation, documenting clinical information in the health record, independently interpreting results and communicating them to the patient/family/caregiver, and ordering medications/tests/procedures was between 15-29 minutes.    The above plan and management options were discussed at length with patient. Patient is in agreement with the above and verbalized understanding. It will be communicated with the referring physician via electronic record, fax, or mail.

## 2024-10-24 ENCOUNTER — TELEPHONE (OUTPATIENT)
Dept: PHARMACY | Facility: CLINIC | Age: 44
End: 2024-10-24

## 2024-10-24 ENCOUNTER — OFFICE VISIT (OUTPATIENT)
Dept: DIABETES SERVICES | Facility: CLINIC | Age: 44
End: 2024-10-24
Payer: COMMERCIAL

## 2024-10-24 VITALS
RESPIRATION RATE: 18 BRPM | SYSTOLIC BLOOD PRESSURE: 158 MMHG | WEIGHT: 293 LBS | OXYGEN SATURATION: 100 % | HEART RATE: 90 BPM | HEIGHT: 66 IN | BODY MASS INDEX: 47.09 KG/M2 | DIASTOLIC BLOOD PRESSURE: 88 MMHG

## 2024-10-24 DIAGNOSIS — E11.9 TYPE 2 DIABETES MELLITUS WITHOUT COMPLICATION, WITH LONG-TERM CURRENT USE OF INSULIN: ICD-10-CM

## 2024-10-24 DIAGNOSIS — Z79.4 TYPE 2 DIABETES MELLITUS WITH HYPERGLYCEMIA, WITH LONG-TERM CURRENT USE OF INSULIN: Primary | ICD-10-CM

## 2024-10-24 DIAGNOSIS — I10 ESSENTIAL HYPERTENSION, BENIGN: ICD-10-CM

## 2024-10-24 DIAGNOSIS — E11.65 TYPE 2 DIABETES MELLITUS WITH HYPERGLYCEMIA, WITH LONG-TERM CURRENT USE OF INSULIN: Primary | ICD-10-CM

## 2024-10-24 DIAGNOSIS — E11.65 TYPE 2 DIABETES MELLITUS WITH HYPERGLYCEMIA, WITHOUT LONG-TERM CURRENT USE OF INSULIN: ICD-10-CM

## 2024-10-24 DIAGNOSIS — Z79.4 TYPE 2 DIABETES MELLITUS WITHOUT COMPLICATION, WITH LONG-TERM CURRENT USE OF INSULIN: ICD-10-CM

## 2024-10-24 LAB — GLUCOSE SERPL-MCNC: 212 MG/DL (ref 70–110)

## 2024-10-24 PROCEDURE — 3060F POS MICROALBUMINURIA REV: CPT | Mod: CPTII,,, | Performed by: NURSE PRACTITIONER

## 2024-10-24 PROCEDURE — 99999PBSHW POCT GLUCOSE, HAND-HELD DEVICE: Mod: PBBFAC,,,

## 2024-10-24 PROCEDURE — 82962 GLUCOSE BLOOD TEST: CPT | Mod: PBBFAC | Performed by: NURSE PRACTITIONER

## 2024-10-24 PROCEDURE — 4010F ACE/ARB THERAPY RXD/TAKEN: CPT | Mod: CPTII,,, | Performed by: NURSE PRACTITIONER

## 2024-10-24 PROCEDURE — 3046F HEMOGLOBIN A1C LEVEL >9.0%: CPT | Mod: CPTII,,, | Performed by: NURSE PRACTITIONER

## 2024-10-24 PROCEDURE — 3077F SYST BP >= 140 MM HG: CPT | Mod: CPTII,,, | Performed by: NURSE PRACTITIONER

## 2024-10-24 PROCEDURE — 3066F NEPHROPATHY DOC TX: CPT | Mod: CPTII,,, | Performed by: NURSE PRACTITIONER

## 2024-10-24 PROCEDURE — 99215 OFFICE O/P EST HI 40 MIN: CPT | Mod: PBBFAC | Performed by: NURSE PRACTITIONER

## 2024-10-24 PROCEDURE — 99999 PR PBB SHADOW E&M-EST. PATIENT-LVL V: CPT | Mod: PBBFAC,,, | Performed by: NURSE PRACTITIONER

## 2024-10-24 PROCEDURE — 3079F DIAST BP 80-89 MM HG: CPT | Mod: CPTII,,, | Performed by: NURSE PRACTITIONER

## 2024-10-24 PROCEDURE — 99214 OFFICE O/P EST MOD 30 MIN: CPT | Mod: S$PBB,,, | Performed by: NURSE PRACTITIONER

## 2024-10-24 PROCEDURE — 1159F MED LIST DOCD IN RCRD: CPT | Mod: CPTII,,, | Performed by: NURSE PRACTITIONER

## 2024-10-24 PROCEDURE — 3008F BODY MASS INDEX DOCD: CPT | Mod: CPTII,,, | Performed by: NURSE PRACTITIONER

## 2024-10-24 RX ORDER — ATORVASTATIN CALCIUM 20 MG/1
20 TABLET, FILM COATED ORAL DAILY
Qty: 90 TABLET | Refills: 3 | Status: SHIPPED | OUTPATIENT
Start: 2024-10-24 | End: 2025-10-24

## 2024-10-24 RX ORDER — INSULIN DEGLUDEC 100 U/ML
10 INJECTION, SOLUTION SUBCUTANEOUS NIGHTLY
Qty: 3 ML | Refills: 11 | Status: SHIPPED | OUTPATIENT
Start: 2024-10-24 | End: 2025-10-24

## 2024-10-24 RX ORDER — LOSARTAN POTASSIUM 100 MG/1
100 TABLET ORAL DAILY
Qty: 90 TABLET | Refills: 3 | Status: SHIPPED | OUTPATIENT
Start: 2024-10-24 | End: 2025-10-24

## 2024-10-24 RX ORDER — DAPAGLIFLOZIN 10 MG/1
10 TABLET, FILM COATED ORAL DAILY
Qty: 90 TABLET | Refills: 3 | Status: SHIPPED | OUTPATIENT
Start: 2024-10-24 | End: 2024-10-25

## 2024-10-24 RX ORDER — PEN NEEDLE, DIABETIC 31 GX5/16"
NEEDLE, DISPOSABLE MISCELLANEOUS
Qty: 100 EACH | Refills: 2 | Status: SHIPPED | OUTPATIENT
Start: 2024-10-24

## 2024-10-24 NOTE — TELEPHONE ENCOUNTER
We have reviewed Ms. Trent current medication list and/or insurance status. Unfortunately, The Pharmacy Patient Assistance Team is unable to assist at this time due to the following reasons      Patient has Medicaid/Government Insurance     JIAN DELACRUZ [facility] [place] Sowmya John  Pharmacy Patient Assistance Team

## 2024-10-24 NOTE — LETTER
October 24, 2024    Ashly Trent  [address]  [place] [address]           Dear Ms. Trent,      My name is Sowmyabin John.  I am reaching out on behalf of [facility]s Pharmacy Patient Assistance Team after receiving a referral from your Provider inquiring about assistance with your medication.  We have reviewed your current medication list and/or insurance status. Unfortunately, The Pharmacy Patient Assistance Team is unable to assist at this time due to the following reasons      Patient has Medicaid/Government Insurance             Sincerely,   Sowmya PLEITEZ @[phone]  Pharmacy Patient Assistance  [address] [place] [address]  [address] [place] [address]  Fax: [phone]  Email: [email or web address]

## 2024-10-24 NOTE — PROGRESS NOTES
Subjective:         Patient ID: Ashly Trent is a 44 y.o. female.  Patient's current PCP is Rosaura Manzanares FNP.     Chief Complaint: Diabetes Mellitus (Patient is here to establish care and glucose check. Patient is checking glucose once daily. No patient complaints.)    HPI  Ashly Trent is a 44 y.o. Other female presenting for a new consult with me, previously seen by MIGUEL Hahn  for diabetes. Patient has been diagnosed with type 2 diabetes for several years.  Received diabetes education: no     CURRENT DM MEDICATIONS:   Diabetes Medications               semaglutide (OZEMPIC) 1 mg/dose (4 mg/3 mL) Inject 1 mg into the skin every 7 days.              Past failed treatment include: metformin- GI upset; jardiance- drug eruption and chronic yeast infections    Farixga-- couldn't get because of insurance     Stopped insulin on her own--- feeling jittery   Levemir 32units   Novolog  sliding scale       Blood glucose testing is performed regularly. Patient is testing 1 times per day.  Meter:   Preferred lab: [facility]     Any episodes of hypoglycemia? no     Complications related to diabetes: cardiovascular disease    Her blood sugar in the clinic today was:   Lab Results   Component Value Date    POCGLU 212 (A) 10/24/2024       Ashly Trent presents today for follow up visit to discuss diabetes management. She was on levemir and novolog in the past but took herself off shortly after starting because it made her feel funny--- describing hypo S/S. Discussed the need to slowly bring glucose levels down vs quickly secondary to baseline levels and how symptomatic she feels. Educated on diet and exercise.    She is a patient of pain treatment and has to stop her ozempic a week prior to her procedures.   Educated on uncontrolled diabetes and risks associated with     Current diet: does not follow diabetic diet   Activity Level: light     Lab Results   Component Value Date    HGBA1C 11.4 (H) 10/11/2024    HGBA1C 11.1 (H)  "07/12/2024    HGBA1C 11.7 (H) 02/21/2024       STANDARDS OF CARE  Diabetes Management Status    Statin: Taking  ACE/ARB: Taking    Screening or Prevention Patient's value Goal Complete/Controlled?   HgA1C Testing and Control   Lab Results   Component Value Date    HGBA1C 11.4 (H) 10/11/2024      Annually/Less than 8% No   Lipid profile : 07/12/2024 Annually Yes   LDL control Lab Results   Component Value Date    LDLCALC 53 07/12/2024    Annually/Less than 100 mg/dl  Yes   Nephropathy screening Lab Results   Component Value Date    LABMICR 62.7 (H) 02/21/2024     Lab Results   Component Value Date    PROTEINUA Negative 06/16/2024     No results found for: "UTPCR"   Annually Yes   Blood pressure BP Readings from Last 1 Encounters:   10/24/24 (!) 158/88    Less than 140/90 No   Dilated retinal exam : 08/14/2024 Annually Yes   Foot exam   : 07/12/2024 Annually Yes          Labs reviewed and are noted below.    Lab Results   Component Value Date    WBC 7.96 09/12/2024    HGB 14.3 09/12/2024    HCT 44.2 09/12/2024     09/12/2024    CHOL 121 07/12/2024    TRIG 93 07/12/2024    HDL 49 07/12/2024    LDLCALC 53 07/12/2024    ALT 30 07/12/2024    AST 15 07/12/2024     09/12/2024    K 3.8 09/12/2024     09/12/2024    ANIONGAP 13 09/12/2024    CREATININE 1.17 (H) 09/12/2024    ESTGFRAFRICA 98 09/22/2021    EGFRNONAA 63 02/17/2022    BUN 11 09/12/2024    CO2 25 09/12/2024    TSH 1.330 07/12/2024    INR 1.04 09/12/2024     (H) 09/12/2024    MICROALBUR 4.7 (H) 02/21/2024     Lab Results   Component Value Date    TSH 1.330 07/12/2024    CALCIUM 9.6 09/12/2024     No results found for: "CPEPTIDE"  No results found for: "GLUTAMICACID"  Glucose   Date Value Ref Range Status   09/12/2024 306 (H) 74 - 106 mg/dL Final     Anion Gap   Date Value Ref Range Status   09/12/2024 13 7 - 16 mmol/L Final     eGFR    Date Value Ref Range Status   09/22/2021 98 >=60 mL/min/1.73m² Final     eGFR   Date Value " Ref Range Status   02/17/2022 63 >=60 mL/min/1.73m² Final       The following portions of the patient's history were reviewed and updated as appropriate: allergies, current medications, past family history, past medical history, past social history, past surgical history, and problem list.    Review of patient's allergies indicates:   Allergen Reactions    Amoxicillin Rash     Social History     Socioeconomic History    Marital status: Single   Tobacco Use    Smoking status: Never    Smokeless tobacco: Never   Substance and Sexual Activity    Alcohol use: Yes    Drug use: Not Currently    Sexual activity: Yes     Social Drivers of Health     Financial Resource Strain: Low Risk  (10/4/2024)    Overall Financial Resource Strain (CARDIA)     Difficulty of Paying Living Expenses: Not very hard   Food Insecurity: No Food Insecurity (10/4/2024)    Hunger Vital Sign     Worried About Running Out of Food in the Last Year: Never true     Ran Out of Food in the Last Year: Never true   Transportation Needs: No Transportation Needs (8/9/2024)    Received from [facility]    PRAPARE - Transportation     Lack of Transportation (Medical): No     Lack of Transportation (Non-Medical): No   Physical Activity: Insufficiently Active (10/4/2024)    Exercise Vital Sign     Days of Exercise per Week: 5 days     Minutes of Exercise per Session: 10 min   Stress: No Stress Concern Present (10/4/2024)    [place] [employer]ational [employer] - Occupational Stress Questionnaire     Feeling of Stress : Not at all   Housing Stability: Unknown (10/4/2024)    Housing Stability Vital Sign     Unable to Pay for Housing in the Last Year: No     Past Medical History:   Diagnosis Date    Acid reflux     Diabetes mellitus, type 2     HTN (hypertension)     Hyperlipidemia     Migraine with aura     Obesity        REVIEW OF SYSTEMS:  Eyes No history of DRAmbrose  Cardiovascular: History of HTN and HLD   GI: Denies  "nausea,vomiting,constipation,or diarrhea.  : Denies dysuria.  SKIN: Denies rashes and lesions.  Neuro: No neuropathy.  PSYCH: No tobacco use.  ENDO: See HPI.        Objective:      Vitals:    10/24/24 0804   BP: (!) 158/88   Pulse: 90   Resp: 18     RESPIRATORY: No respiratory distress  NEUROLOGIC: Cranial nerves II-XII grossly intact.   PSYCHIATRIC: Alert & oriented x3. Normal mood and affect.  FOOT EXAMINATION: pulses present, no edema present   Assessment:       1. Type 2 diabetes mellitus with hyperglycemia, with long-term current use of insulin    2. Type 2 diabetes mellitus without complication, with long-term current use of insulin    3. Essential hypertension, benign    4. Type 2 diabetes mellitus with hyperglycemia, without long-term current use of insulin        Plan:   Type 2 diabetes mellitus with hyperglycemia, with long-term current use of insulin  -     POCT Glucose, Hand-Held Device  -     Ambulatory referral/consult to Pharmacy Assistance; Future; Expected date: 10/31/2024  -     insulin degludec (TRESIBA FLEXTOUCH U-100) 100 unit/mL (3 mL) insulin pen; Inject 10 Units into the skin every evening.  Dispense: 3 mL; Refill: 11  -     dapagliflozin propanediol (FARXIGA) 10 mg tablet; Take 1 tablet (10 mg total) by mouth once daily.  Dispense: 90 tablet; Refill: 3  -     BD ULTRA-FINE ALESIA PEN NEEDLE 32 gauge x 5/32" Ndle; Use to take your insulin daily  Dispense: 100 each; Refill: 2  -     Ambulatory referral/consult to Diabetic Advanced Practice Providers (Medical Management)    Essential hypertension, benign  -     losartan (COZAAR) 100 MG tablet; Take 1 tablet (100 mg total) by mouth once daily.  Dispense: 90 tablet; Refill: 3    Hyperlipidemia   -     atorvastatin (LIPITOR) 20 MG tablet; Take 1 tablet (20 mg total) by mouth once daily.  Dispense: 90 tablet; Refill: 3          - Follow up: 3 months      Portions of this note may have been created with voice recognition software. Occasional " ""wrong-word" or "sound-a-like" substitutions may have occurred due to the inherent limitations of voice recognition software. Please, read the note carefully and recognize, using context, where substitutions have occurred.         Maggi DENNISON/IGNACIO  [facility]     "

## 2024-10-24 NOTE — PATIENT INSTRUCTIONS
-- Medications adjusted for today's visit include:    Continue your ozempic as you are on--- stop a week prior to your pain treatment injections   Increase your water intake and protein intake           -- Limit carbs to no more than 30-45 grams with each meal. Never eat carbs by themselves, always add protein. Make snacks low carb or non-carb only.    -- Continue checking blood sugar 3 times daily: Fasting blood sugar and vary your next 2 readings: Before lunch, before supper, 2 hours after any meal, or bedtime.   -Blood sugar goals should be a fasting blood sugar between , and no blood sugars throughout the day over 180 is good, less than 160 better, less than 140 perfect.    --Carry glucose tablets/soft peppermints/regular juice or Coke product with you at all times to treat a low blood sugar episode (less than 70). If your blood sugar is between 50-70, Chew 4 tablets or drink 1/2 cup of juice or regular Coke product. If your blood sugar is below 50, double the treatment. Re-check blood sugar in 15 minutes. If still low, repeat this. Always call the clinic to give an update for any low blood sugar episodes.    --Exercise as tolerated: Goal 30 minutes/day, 5 days/week. Start slowly and increase as tolerated.    --Follow-up for your next visit in 3 months     --Please either drop off, fax, or MyChart your readings to me as needed.

## 2024-10-25 ENCOUNTER — PATIENT MESSAGE (OUTPATIENT)
Dept: DIABETES SERVICES | Facility: CLINIC | Age: 44
End: 2024-10-25
Payer: COMMERCIAL

## 2024-10-25 RX ORDER — DAPAGLIFLOZIN 10 MG/1
10 TABLET, FILM COATED ORAL DAILY
Qty: 90 TABLET | Refills: 0 | Status: SHIPPED | OUTPATIENT
Start: 2024-10-25 | End: 2025-01-23

## 2024-10-25 RX ORDER — CANAGLIFLOZIN 100 MG/1
100 TABLET, FILM COATED ORAL DAILY
Qty: 30 TABLET | Refills: 2 | Status: SHIPPED | OUTPATIENT
Start: 2024-10-25 | End: 2024-10-25

## 2024-10-28 ENCOUNTER — DOCUMENTATION ONLY (OUTPATIENT)
Dept: REHABILITATION | Facility: HOSPITAL | Age: 44
End: 2024-10-28
Payer: COMMERCIAL

## 2024-10-28 PROBLEM — M54.42 CHRONIC MIDLINE LOW BACK PAIN WITH LEFT-SIDED SCIATICA: Status: RESOLVED | Noted: 2024-08-13 | Resolved: 2024-10-28

## 2024-10-28 PROBLEM — G89.29 CHRONIC MIDLINE LOW BACK PAIN WITH LEFT-SIDED SCIATICA: Status: RESOLVED | Noted: 2024-08-13 | Resolved: 2024-10-28

## 2024-11-19 ENCOUNTER — ANESTHESIA (OUTPATIENT)
Dept: PAIN MEDICINE | Facility: HOSPITAL | Age: 44
End: 2024-11-19
Payer: COMMERCIAL

## 2024-11-19 ENCOUNTER — ANESTHESIA EVENT (OUTPATIENT)
Dept: PAIN MEDICINE | Facility: HOSPITAL | Age: 44
End: 2024-11-19
Payer: COMMERCIAL

## 2024-11-19 ENCOUNTER — HOSPITAL ENCOUNTER (OUTPATIENT)
Facility: HOSPITAL | Age: 44
Discharge: HOME | End: 2024-11-19
Attending: PAIN MEDICINE | Admitting: PAIN MEDICINE
Payer: COMMERCIAL

## 2024-11-19 VITALS
WEIGHT: 288 LBS | RESPIRATION RATE: 15 BRPM | OXYGEN SATURATION: 100 % | SYSTOLIC BLOOD PRESSURE: 134 MMHG | HEIGHT: 66 IN | HEART RATE: 87 BPM | BODY MASS INDEX: 46.28 KG/M2 | TEMPERATURE: 98 F | DIASTOLIC BLOOD PRESSURE: 92 MMHG

## 2024-11-19 DIAGNOSIS — M54.17 LUMBOSACRAL RADICULOPATHY: ICD-10-CM

## 2024-11-19 LAB
B-HCG UR QL: NEGATIVE
CTP QC/QA: YES
GLUCOSE SERPL-MCNC: 152 MG/DL (ref 70–105)

## 2024-11-19 PROCEDURE — 63600175 PHARM REV CODE 636 W HCPCS: Mod: JG | Performed by: PAIN MEDICINE

## 2024-11-19 PROCEDURE — 81025 URINE PREGNANCY TEST: CPT | Performed by: PAIN MEDICINE

## 2024-11-19 PROCEDURE — 37000008 HC ANESTHESIA 1ST 15 MINUTES: Performed by: PAIN MEDICINE

## 2024-11-19 PROCEDURE — 25500020 PHARM REV CODE 255: Performed by: PAIN MEDICINE

## 2024-11-19 PROCEDURE — 63600175 PHARM REV CODE 636 W HCPCS: Performed by: NURSE ANESTHETIST, CERTIFIED REGISTERED

## 2024-11-19 PROCEDURE — D9220A PRA ANESTHESIA: Mod: ,,, | Performed by: NURSE ANESTHETIST, CERTIFIED REGISTERED

## 2024-11-19 PROCEDURE — 62323 NJX INTERLAMINAR LMBR/SAC: CPT | Performed by: PAIN MEDICINE

## 2024-11-19 PROCEDURE — 62323 NJX INTERLAMINAR LMBR/SAC: CPT | Mod: ,,, | Performed by: PAIN MEDICINE

## 2024-11-19 PROCEDURE — 82962 GLUCOSE BLOOD TEST: CPT

## 2024-11-19 RX ORDER — PROPOFOL 10 MG/ML
VIAL (ML) INTRAVENOUS
Status: DISCONTINUED | OUTPATIENT
Start: 2024-11-19 | End: 2024-11-19

## 2024-11-19 RX ORDER — LIDOCAINE HYDROCHLORIDE 20 MG/ML
INJECTION, SOLUTION EPIDURAL; INFILTRATION; INTRACAUDAL; PERINEURAL
Status: DISCONTINUED | OUTPATIENT
Start: 2024-11-19 | End: 2024-11-19

## 2024-11-19 RX ORDER — BUPIVACAINE HYDROCHLORIDE 2.5 MG/ML
INJECTION, SOLUTION INFILTRATION; PERINEURAL CODE/TRAUMA/SEDATION MEDICATION
Status: DISCONTINUED | OUTPATIENT
Start: 2024-11-19 | End: 2024-11-19 | Stop reason: HOSPADM

## 2024-11-19 RX ORDER — IOPAMIDOL 612 MG/ML
INJECTION, SOLUTION INTRATHECAL CODE/TRAUMA/SEDATION MEDICATION
Status: DISCONTINUED | OUTPATIENT
Start: 2024-11-19 | End: 2024-11-19 | Stop reason: HOSPADM

## 2024-11-19 RX ORDER — SODIUM CHLORIDE 9 MG/ML
INJECTION, SOLUTION INTRAVENOUS CONTINUOUS
Status: DISCONTINUED | OUTPATIENT
Start: 2024-11-19 | End: 2024-11-19 | Stop reason: HOSPADM

## 2024-11-19 RX ORDER — TRIAMCINOLONE ACETONIDE 40 MG/ML
INJECTION, SUSPENSION INTRA-ARTICULAR; INTRAMUSCULAR CODE/TRAUMA/SEDATION MEDICATION
Status: DISCONTINUED | OUTPATIENT
Start: 2024-11-19 | End: 2024-11-19 | Stop reason: HOSPADM

## 2024-11-19 RX ADMIN — LIDOCAINE HYDROCHLORIDE 60 MG: 20 INJECTION, SOLUTION EPIDURAL; INFILTRATION; INTRACAUDAL; PERINEURAL at 12:11

## 2024-11-19 RX ADMIN — PROPOFOL 50 MG: 10 INJECTION, EMULSION INTRAVENOUS at 12:11

## 2024-11-19 RX ADMIN — PROPOFOL 100 MG: 10 INJECTION, EMULSION INTRAVENOUS at 12:11

## 2024-11-19 NOTE — BRIEF OP NOTE
The  Discharge Note  Short Stay    Admit Date: 11/19/2024    Discharge Date: 11/19/2024    Attending Physician: Dalia Day     Discharge Provider: aDlia Day    Diagnosis: Lumbosacral radiculopathy    Procedure performed: L5-S1 OMAR and epiduralgram under fluoroscopy    Findings: Procedure tolerated well and without complications. Consistent with diagnosis.    EBL: 0cc    Specimens: None    Discharged Condition: Good    Final Diagnoses: Lumbosacral radiculopathy [M54.17]    Disposition: Home or Self Care    Hospital Course: No complications, uneventful    Outcome of Hospitalization, Treatment, Procedure, or Surgery:  Patient was admitted for outpatient interventional pain management procedure. The patient tolerated the procedure well with no complications.    Follow up/Patient Instructions:  Follow up as scheduled in Pain Management office in 3-4 weeks.  Patient has received instructions and follow up date and time.    Medications:  Continue previous medications

## 2024-11-19 NOTE — DISCHARGE SUMMARY
[facility] - Pain Management  Discharge Note  Short Stay    Procedure(s) (LRB):  L5-S1 OMAR (Bilateral)      OUTCOME: Patient tolerated treatment/procedure well without complication and is now ready for discharge.    DISPOSITION: Home or Self Care    FINAL DIAGNOSIS:  Lumbosacral radiculopathy    FOLLOWUP: In clinic    DISCHARGE INSTRUCTIONS:  See nurse's notes     TIME SPENT ON DISCHARGE: 5 minutes

## 2024-11-19 NOTE — ANESTHESIA PREPROCEDURE EVALUATION
11/19/2024  Ashly Trent is a 44 y.o., female.    Past Medical History:   Diagnosis Date    Acid reflux     Diabetes mellitus, type 2     HTN (hypertension)     Hyperlipidemia     Migraine with aura     Obesity        Past Surgical History:   Procedure Laterality Date    CHOLECYSTECTOMY      TONSILLECTOMY         Family History   Problem Relation Name Age of Onset    Asthma Mother      Diabetes Mother      Hypertension Mother      Hypertension Father      Diabetes Paternal Grandmother      Heart disease Paternal Grandfather      Hypertension Sister      No Known Problems Brother      No Known Problems Brother         Social History     Socioeconomic History    Marital status: Single   Tobacco Use    Smoking status: Never    Smokeless tobacco: Never   Substance and Sexual Activity    Alcohol use: Yes    Drug use: Not Currently    Sexual activity: Yes     Social Drivers of Health     Financial Resource Strain: Low Risk  (10/4/2024)    Overall Financial Resource Strain (CARDIA)     Difficulty of Paying Living Expenses: Not very hard   Food Insecurity: No Food Insecurity (10/4/2024)    Hunger Vital Sign     Worried About Running Out of Food in the Last Year: Never true     Ran Out of Food in the Last Year: Never true   Transportation Needs: No Transportation Needs (8/9/2024)    [facility]     Lack of Transportation (Medical): No     Lack of Transportation (Non-Medical): No   Physical Activity: Insufficiently Active (10/4/2024)    Exercise Vital Sign     Days of Exercise per Week: 5 days     Minutes of Exercise per Session: 10 min   Stress: No Stress Concern Present (10/4/2024)    [employer] - Occupational Stress Questionnaire     Feeling of Stress : Not at all   Housing Stability: Unknown (10/4/2024)     Housing Stability Vital Sign     Unable to Pay for Housing in the Last Year: No       Current Facility-Administered Medications   Medication Dose Route Frequency Provider Last Rate Last Admin    0.9%  NaCl infusion   Intravenous Continuous Dalia Day MD           Review of patient's allergies indicates:   Allergen Reactions    Amoxicillin Rash        Pre-op Assessment    I have reviewed the Patient Summary Reports.     I have reviewed the Nursing Notes. I have reviewed the NPO Status.   I have reviewed the Medications.     Review of Systems  Anesthesia Hx:  No problems with previous Anesthesia                Cardiovascular:     Hypertension           hyperlipidemia                               Hepatic/GI:     GERD                Neurological:      Headaches                                 Endocrine:  Diabetes           Psych:  Psychiatric History                  Physical Exam    Airway:  Mallampati: III   Mouth Opening: Normal  TM Distance: Normal  Tongue: Normal  Neck ROM: Normal ROM        Anesthesia Plan  Type of Anesthesia, risks & benefits discussed:    Anesthesia Type: Gen Natural Airway, MAC  Intra-op Monitoring Plan: Standard ASA Monitors  Post Op Pain Control Plan: multimodal analgesia  Induction:  IV  Informed Consent: Informed consent signed with the Patient and all parties understand the risks and agree with anesthesia plan.  All questions answered. Patient consented to blood products? Yes  ASA Score: 3  Day of Surgery Review of History & Physical: H&P Update referred to the surgeon/provider.I have interviewed and examined the patient. I have reviewed the patient's H&P dated:     Ready For Surgery From Anesthesia Perspective.     .

## 2024-11-19 NOTE — TRANSFER OF CARE
"Anesthesia Transfer of Care Note    Patient: Ashly Trent    Procedure(s) Performed: Procedure(s) (LRB):  L5-S1 OMAR (Bilateral)    Patient location: Other:    Anesthesia Type: MAC    Transport from OR: Transported from OR on room air with adequate spontaneous ventilation    Post pain: adequate analgesia    Post assessment: no apparent anesthetic complications    Post vital signs: stable    Level of consciousness: sedated and responds to stimulation    Nausea/Vomiting: no nausea/vomiting    Complications: none    Transfer of care protocol was followedComments: Good SV continue, NAD, VSS, RTRN      Last vitals: Visit Vitals  /76   Pulse 88   Temp 36.6 °C (97.8 °F)   Resp 17   Ht 5' 6" (1.676 m)   Wt 130.6 kg (288 lb)   LMP 10/17/2024   SpO2 99%   BMI 46.48 kg/m²     "

## 2024-11-19 NOTE — OP NOTE
"Procedure Note    Procedure Date: 11/19/2024    Procedure Performed:  Lumbar interlaminar epidural steroid injection under fluoroscopy at L5-S1    Indications: Patient failed conservative therapy.      Pre-op diagnosis: Lumbar Radiculopathy    Post-op diagnosis: same    Physician: Dalia Day MD    Anesthesia: MAC    Medications injected: Kenalog 40mg,  2 mL sterile preservative-free normal saline.    Local anesthetic used: 1% Lidocaine, 3 ml    Estimated Blood Loss: None    Complications:  None    Technique:  The patient was interviewed in the holding area and Risks/Benefits were discussed, including, but not limited to, the possibility of new or different pain, bleeding or infection.   All questions were answered.  The patient understood and accepted risks.  Consent was verified and signed.   A time-out was taken to identify patient and procedure prior to starting the procedure. The patient was placed in the prone position on the fluoroscopy table. The area of the lumbar spine was prepped with Chloraprep and draped in a sterile manner. The L5-S1  interspace was identified and marked under AP fluoroscopy. The skin and subcutaneous tissues overlying the targeted interspace were anesthetized with 3-5 mL of 1% lidocaine using a 25G 1.5" needle.  A 20G  3.5" Tuohy epidural needle was directed toward the interspace under fluoroscopic guidance until the ligamentum flavum was engaged. From this point, a loss of resistance technique with a pulsator syringe a was used to identify entrance of the needle into the epidural space. Once loss of resistance was observed 3mL of Isovue contrast solution was injected. An appropriate epidurogram was noted.  A 3mL mixture consisting of saline and 40 mg of kenalog was injected slowly and without resistance.  The needle was  removed and a sterile Band-Aid dressing was applied to the puncture site.  The patient tolerated the procedure well and was transferred to the .AC. in stable " condition.  The patient was monitored after the procedure and was given post-procedure and discharge instructions to follow at home. The patient was discharged in a stable condition and accompanied by an adult .    Epidurogram:5 mL allotment of Isovue M 300 contrast revealed excellent delineation from L4-S1. There were no filling defects or obstruction to dye flow noted.  There was no  intravascular or intrathecal spread noted with dye flow.

## 2024-11-19 NOTE — PLAN OF CARE
REFER TO WRITTEN DOCUMENT AND RECOVERY INFORMATION.    D/CD PATIENT VIAA WHEELCHAIR AT 1324.    INFORMED PATIENT IF UNABLE TO VOID IN 8 HOURS, GO TO ER. NOTIFY MD OF REDNESS OR DRAINAGE FROM INJECTION SITE OR FEVER OVER 3-4 DAY. REST AND DRINK PLENTY OF FLUIDS FOR THE REMAINDER OF THE DAY. NO LIFTING OVER 5 LBS FOR THE REMAINDER OF THE DAY. CONTINUE REGULAR MEDICATIONS AS PRESCRIBED. MAY TAKE PAIN MEDICATION AS PRESCRIBED.     PAIN IMPROVED  100%  PRE OP PAIN 6.  POSTOP PAIN 0.

## 2024-11-19 NOTE — ANESTHESIA POSTPROCEDURE EVALUATION
Anesthesia Post Evaluation    Patient: Ashly Trent    Procedure(s) Performed: Procedure(s) (LRB):  L5-S1 OMAR (Bilateral)    Final Anesthesia Type: MAC      Patient location: Pain Treatment.  Patient participation: Yes- Able to Participate  Level of consciousness: awake and alert  Post-procedure vital signs: reviewed and stable  Pain management: adequate  Airway patency: patent    PONV status at discharge: No PONV  Anesthetic complications: no      Cardiovascular status: stable  Respiratory status: unassisted  Hydration status: euvolemic  Follow-up not needed.              Vitals Value Taken Time   BP 97/71 11/19/24 1245   Temp 97.3 11/19/24 1246   Pulse 95 11/19/24 1245   Resp 13 11/19/24 1245   SpO2 98 % 11/19/24 1245   Vitals shown include unfiled device data.      No case tracking events are documented in the log.      Pain/Khanh Score: Khanh Score: 9 (11/19/2024 12:40 PM)

## 2024-11-21 PROBLEM — M15.0 PRIMARY OSTEOARTHRITIS INVOLVING MULTIPLE JOINTS: Chronic | Status: ACTIVE | Noted: 2024-11-21

## 2024-11-21 RX ORDER — ACETAMINOPHEN AND CODEINE PHOSPHATE 300; 30 MG/1; MG/1
1 TABLET ORAL EVERY 8 HOURS PRN
Qty: 45 TABLET | Refills: 0 | Status: CANCELLED | OUTPATIENT
Start: 2024-11-21 | End: 2024-12-21

## 2024-11-21 NOTE — PROGRESS NOTES
Subjective:         Patient ID: Ashly Trent is a 44 y.o. female.    Chief Complaint: Back Pain      Pain  This is a chronic problem. The current episode started more than 1 year ago. The problem occurs daily. The problem has been waxing and waning. Associated symptoms include arthralgias. Pertinent negatives include no anorexia, change in bowel habit, chest pain, chills, coughing, diaphoresis, fever, neck pain, rash, sore throat, swollen glands, urinary symptoms, vertigo or vomiting.     Review of Systems   Constitutional:  Negative for activity change, appetite change, chills, diaphoresis, fever and unexpected weight change.   HENT:  Negative for drooling, ear discharge, ear pain, facial swelling, nosebleeds, sore throat, trouble swallowing, voice change and goiter.    Eyes:  Negative for photophobia, pain, discharge, redness and visual disturbance.   Respiratory:  Negative for apnea, cough, choking, chest tightness, shortness of breath, wheezing and stridor.    Cardiovascular:  Negative for chest pain, palpitations and leg swelling.   Gastrointestinal:  Negative for abdominal distention, anorexia, change in bowel habit, diarrhea, rectal pain, vomiting and fecal incontinence.   Endocrine: Negative for cold intolerance, heat intolerance, polydipsia, polyphagia and polyuria.   Genitourinary:  Negative for bladder incontinence, dysuria, flank pain, frequency and hot flashes.   Musculoskeletal:  Positive for arthralgias, back pain and leg pain. Negative for neck pain.   Integumentary:  Negative for color change, pallor and rash.   Allergic/Immunologic: Negative for immunocompromised state.   Neurological:  Negative for dizziness, vertigo, seizures, syncope, facial asymmetry, speech difficulty, light-headedness, memory loss and coordination difficulties.   Hematological:  Negative for adenopathy. Does not bruise/bleed easily.   Psychiatric/Behavioral:  Negative for agitation, behavioral problems, confusion,  decreased concentration, dysphoric mood, hallucinations, self-injury and suicidal ideas. The patient is not nervous/anxious and is not hyperactive.            Past Medical History:   Diagnosis Date    Acid reflux     Diabetes mellitus, type 2     HTN (hypertension)     Hyperlipidemia     Migraine with aura     Obesity      Past Surgical History:   Procedure Laterality Date    CHOLECYSTECTOMY      EPIDURAL STEROID INJECTION INTO LUMBAR SPINE Bilateral 11/19/2024    Procedure: L5-S1 OMAR;  Surgeon: Dalia Day MD;  Location: [facility];  Service: Pain Management;  Laterality: Bilateral;    TONSILLECTOMY       Social History     Socioeconomic History    Marital status: Single   Tobacco Use    Smoking status: Never    Smokeless tobacco: Never   Substance and Sexual Activity    Alcohol use: Yes    Drug use: Not Currently    Sexual activity: Yes     Social Drivers of Health     Financial Resource Strain: Low Risk  (10/4/2024)    Overall Financial Resource Strain (CARDIA)     Difficulty of Paying Living Expenses: Not very hard   Food Insecurity: No Food Insecurity (11/27/2024)    Received from [facility]    Hunger Vital Sign     Worried About Running Out of Food in the Last Year: Never true     Ran Out of Food in the Last Year: Never true   Transportation Needs: No Transportation Needs (11/27/2024)    Received from [facility]    PRAPARE - Transportation     Lack of Transportation (Medical): No     Lack of Transportation (Non-Medical): No   Physical Activity: Sufficiently Active (11/27/2024)    Received from [facility]    Exercise Vital Sign     Days of Exercise per Week: 3 days     Minutes of Exercise per Session: 130 min   Recent Concern: Physical Activity - Insufficiently Active (10/4/2024)    Exercise Vital Sign     Days of Exercise per Week: 5 days     Minutes of Exercise per Session: 10 min   Stress: No Stress  "Concern Present (11/27/2024)    Received from [facility]    [employer] [place] [employer] - Occupational Stress Questionnaire     Feeling of Stress : Not at all   Housing Stability: Low Risk  (11/27/2024)    Received from [facility]    Housing Stability Vital Sign     Unable to Pay for Housing in the Last Year: No     Number of Times Moved in the Last Year: 0     Homeless in the Last Year: No     Family History   Problem Relation Name Age of Onset    Asthma Mother      Diabetes Mother      Hypertension Mother      Hypertension Father      Diabetes Paternal Grandmother      Heart disease Paternal Grandfather      Hypertension Sister      No Known Problems Brother      No Known Problems Brother       Review of patient's allergies indicates:   Allergen Reactions    Amoxicillin Rash        Objective:  Vitals:    12/05/24 1410 12/05/24 1411   BP: (!) 142/80    Pulse: 105    Resp: 18    SpO2: 99%    Weight: 129.7 kg (286 lb)    Height: 5' 6" (1.676 m)    PainSc:   5   5         Physical Exam  Vitals and nursing note reviewed. Exam conducted with a chaperone present.   Constitutional:       General: She is awake. She is not in acute distress.     Appearance: Normal appearance. She is not ill-appearing, toxic-appearing or diaphoretic.   HENT:      Head: Normocephalic and atraumatic.      Nose: Nose normal.      Mouth/Throat:      Mouth: Mucous membranes are moist.      Pharynx: Oropharynx is clear.   Eyes:      Conjunctiva/sclera: Conjunctivae normal.      Pupils: Pupils are equal, round, and reactive to light.   Cardiovascular:      Rate and Rhythm: Normal rate.   Pulmonary:      Effort: Pulmonary effort is normal. No respiratory distress.   Abdominal:      Palpations: Abdomen is soft.      Tenderness: There is no guarding.   Musculoskeletal:         General: Normal range of motion.      Cervical back: Normal range of motion and neck supple. No " rigidity.   Skin:     General: Skin is warm and dry.      Coloration: Skin is not jaundiced or pale.   Neurological:      General: No focal deficit present.      Mental Status: She is alert and oriented to person, place, and time. Mental status is at baseline.      Cranial Nerves: No cranial nerve deficit (II-XII).   Psychiatric:         Mood and Affect: Mood normal.         Behavior: Behavior normal. Behavior is cooperative.         Thought Content: Thought content normal.           [place] Fluoro for Pain Management  See OP Notes for results.     IMPRESSION: See OP Notes for results.     This procedure was auto-finalized by: Virtual Radiologist       Admission on 11/19/2024, Discharged on 11/19/2024   Component Date Value Ref Range Status    POC Preg Test, Ur 11/19/2024 Negative  Negative Final     Acceptable 11/19/2024 Yes   Final    POC Glucose 11/19/2024 152 (H)  70 - 105 mg/dL Final   Office Visit on 10/24/2024   Component Date Value Ref Range Status    POC Glucose 10/24/2024 212 (A)  70 - 110 MG/DL Final   Office Visit on 10/11/2024   Component Date Value Ref Range Status    Hemoglobin A1C 10/11/2024 11.4 (H)  4.5 - 6.6 % Final    Estimated Average Glucose 10/11/2024 280  mg/dL Final   Admission on 09/12/2024, Discharged on 09/12/2024   Component Date Value Ref Range Status    Sodium 09/12/2024 136  136 - 145 mmol/L Final    Potassium 09/12/2024 3.8  3.5 - 5.1 mmol/L Final    Chloride 09/12/2024 102  98 - 107 mmol/L Final    CO2 09/12/2024 25  21 - 32 mmol/L Final    Anion Gap 09/12/2024 13  7 - 16 mmol/L Final    Glucose 09/12/2024 306 (H)  74 - 106 mg/dL Final    BUN 09/12/2024 11  7 - 18 mg/dL Final    Creatinine 09/12/2024 1.17 (H)  0.55 - 1.02 mg/dL Final    BUN/Creatinine Ratio 09/12/2024 9  6 - 20 Final    Calcium 09/12/2024 9.6  8.5 - 10.1 mg/dL Final    eGFR 09/12/2024 60  >=60 mL/min/1.73m2 Final    Troponin I High Sensitivity 09/12/2024 <4.0  <=60.4 pg/mL Final    QRS Duration 09/12/2024  72  ms Final    [facility] QTC Calculation 09/12/2024 455  ms Final    PT 09/12/2024 13.5  11.7 - 14.7 seconds Final    INR 09/12/2024 1.04  <=3.30 Final    PTT 09/12/2024 25.2  25.2 - 37.3 seconds Final    Magnesium 09/12/2024 2.0  1.7 - 2.3 mg/dL Final    WBC 09/12/2024 7.96  4.50 - 11.00 K/uL Final    RBC 09/12/2024 4.93  4.20 - 5.40 M/uL Final    Hemoglobin 09/12/2024 14.3  12.0 - 16.0 g/dL Final    Hematocrit 09/12/2024 44.2  38.0 - 47.0 % Final    MCV 09/12/2024 89.7  80.0 - 96.0 fL Final    MCH 09/12/2024 29.0  27.0 - 31.0 pg Final    MCHC 09/12/2024 32.4  32.0 - 36.0 g/dL Final    RDW 09/12/2024 12.6  11.5 - 14.5 % Final    Platelet Count 09/12/2024 287  150 - 400 K/uL Final    MPV 09/12/2024 10.4  9.4 - 12.4 fL Final    Neutrophils % 09/12/2024 51.1 (L)  53.0 - 65.0 % Final    Lymphocytes % 09/12/2024 38.1  27.0 - 41.0 % Final    Monocytes % 09/12/2024 6.4 (H)  2.0 - 6.0 % Final    Eosinophils % 09/12/2024 3.4  1.0 - 4.0 % Final    Basophils % 09/12/2024 0.6  0.0 - 1.0 % Final    Immature Granulocytes % 09/12/2024 0.4  0.0 - 0.4 % Final    nRBC, Auto 09/12/2024 0.0  <=0.0 % Final    Neutrophils, Abs 09/12/2024 4.07  1.80 - 7.70 K/uL Final    Lymphocytes, Absolute 09/12/2024 3.03  1.00 - 4.80 K/uL Final    Monocytes, Absolute 09/12/2024 0.51  0.00 - 0.80 K/uL Final    Eosinophils, Absolute 09/12/2024 0.27  0.00 - 0.50 K/uL Final    Basophils, Absolute 09/12/2024 0.05  0.00 - 0.20 K/uL Final    Immature Granulocytes, Absolute 09/12/2024 0.03  0.00 - 0.04 K/uL Final    nRBC, Absolute 09/12/2024 0.00  <=0.00 x10e3/uL Final    Diff Type 09/12/2024 Auto   Final    Troponin I High Sensitivity 09/12/2024 4.0  <=60.4 pg/mL Final    D-Dimer 09/12/2024 0.36  0.00 - 0.47 µg/mL Final   Lab Visit on 08/30/2024   Component Date Value Ref Range Status    Vitamin D 25-Hydroxy, Blood 08/30/2024 17.5  ng/mL Final   Office Visit on 08/28/2024   Component Date Value Ref Range Status    POC Amphetamines 08/28/2024 Negative  Negative,  Inconclusive Final    POC Barbiturates 08/28/2024 Negative  Negative, Inconclusive Final    POC Benzodiazepines 08/28/2024 Negative  Negative, Inconclusive Final    POC Cocaine 08/28/2024 Negative  Negative, Inconclusive Final    POC THC 08/28/2024 Negative  Negative, Inconclusive Final    POC Methadone 08/28/2024 Negative  Negative, Inconclusive Final    POC Methamphetamine 08/28/2024 Negative  Negative, Inconclusive Final    POC Opiates 08/28/2024 Negative  Negative, Inconclusive Final    POC Oxycodone 08/28/2024 Negative  Negative, Inconclusive Final    POC Phencyclidine 08/28/2024 Negative  Negative, Inconclusive Final    POC Methylenedioxymethamphetamine * 08/28/2024 Negative  Negative, Inconclusive Final    POC Tricyclic Antidepressants 08/28/2024 Negative  Negative, Inconclusive Final    POC Buprenorphine 08/28/2024 Negative   Final     Acceptable 08/28/2024 Yes   Final    POC Temperature (Urine) 08/28/2024 92   Final    pH, UA 08/28/2024 5.5  5.0 to 8.0 pH Units Final    Creatinine, Urine 08/28/2024 155  28 - 219 mg/dL Final    6-Acetylmorphine 08/28/2024 Negative  10 ng/mL Final    7-Aminoclonazepam 08/28/2024 Negative  Negative 25 ng/mL Final    a-Hydroxyalprazolam 08/28/2024 Negative  Negative 25 ng/mL Final    Benzoylecgonine 08/28/2024 Negative  100 ng/mL Final    Buprenorphine 08/28/2024 Negative  25 ng/mL Final    Codeine 08/28/2024 Negative  25 ng/mL Final    EDDP 08/28/2024 Negative  25 ng/mL Final    Fentanyl 08/28/2024 Negative  2.5 ng/mL Final    Hydrocodone 08/28/2024 Negative  25 ng/mL Final    Hydromorphone 08/28/2024 Negative  25 ng/mL Final    Morphine 08/28/2024 Negative  25 ng/mL Final    Norbuprenorphine 08/28/2024 Negative  25 ng/mL Final    Nordiazepam 08/28/2024 Negative  25 ng/mL Final    Norfentanyl Oxalate 08/28/2024 Negative  5 ng/mL Final    Norhydrocodone 08/28/2024 Negative  50 ng/mL Final    Noroxycodone HCL 08/28/2024 Negative  50 ng/mL Final    Oxymorphone  08/28/2024 Negative  25 ng/mL Final    Tapentadol 08/28/2024 Negative  25 ng/mL Final    Temazepam 08/28/2024 Negative  25 ng/mL Final    THC-COOH 08/28/2024 Negative  25 ng/mL Final    Tramadol 08/28/2024 Negative  100 ng/mL Final    Amphetamine, Urine 08/28/2024 Negative  Negative Final    Methamphetamines, Urine 08/28/2024 Negative  Negative Final    Methadone, Urine 08/28/2024 Negative  Negative 25 ng/mL Final    Oxycodone, Urine 08/28/2024 Negative  Negative 25 ng/mL Final    Specific Gravity, UA 08/28/2024 1.024  <=1.030 Final   Lab Visit on 07/12/2024   Component Date Value Ref Range Status    Hemoglobin A1C 07/12/2024 11.1 (H)  4.5 - 6.6 % Final    Estimated Average Glucose 07/12/2024 272  mg/dL Final    Sodium 07/12/2024 135 (L)  136 - 145 mmol/L Final    Potassium 07/12/2024 4.0  3.5 - 5.1 mmol/L Final    Chloride 07/12/2024 103  98 - 107 mmol/L Final    CO2 07/12/2024 28  21 - 32 mmol/L Final    Anion Gap 07/12/2024 8  7 - 16 mmol/L Final    Glucose 07/12/2024 229 (H)  74 - 106 mg/dL Final    BUN 07/12/2024 12  7 - 18 mg/dL Final    Creatinine 07/12/2024 0.81  0.55 - 1.02 mg/dL Final    BUN/Creatinine Ratio 07/12/2024 15  6 - 20 Final    Calcium 07/12/2024 9.6  8.5 - 10.1 mg/dL Final    Total Protein 07/12/2024 8.3 (H)  6.4 - 8.2 g/dL Final    Albumin 07/12/2024 3.7  3.5 - 5.0 g/dL Final    Globulin 07/12/2024 4.6 (H)  2.0 - 4.0 g/dL Final    A/G Ratio 07/12/2024 0.8   Final    Bilirubin, Total 07/12/2024 1.0  >0.0 - 1.2 mg/dL Final    Alk Phos 07/12/2024 116 (H)  37 - 98 U/L Final    ALT 07/12/2024 30  13 - 56 U/L Final    AST 07/12/2024 15  15 - 37 U/L Final    eGFR 07/12/2024 94  >=60 mL/min/1.73m2 Final    Triglycerides 07/12/2024 93  35 - 150 mg/dL Final    Cholesterol 07/12/2024 121  0 - 200 mg/dL Final    HDL Cholesterol 07/12/2024 49  40 - 60 mg/dL Final    Cholesterol/HDL Ratio (Risk Factor) 07/12/2024 2.5   Final    Non-HDL 07/12/2024 72  mg/dL Final    LDL Calculated 07/12/2024 53  mg/dL Final     LDL/HDL 07/12/2024 1.1   Final    VLDL 07/12/2024 19  mg/dL Final    TSH 07/12/2024 1.330  0.358 - 3.740 uIU/mL Final    WBC 07/12/2024 8.06  4.50 - 11.00 K/uL Final    RBC 07/12/2024 5.04  4.20 - 5.40 M/uL Final    Hemoglobin 07/12/2024 14.7  12.0 - 16.0 g/dL Final    Hematocrit 07/12/2024 46.3  38.0 - 47.0 % Final    MCV 07/12/2024 91.9  80.0 - 96.0 fL Final    MCH 07/12/2024 29.2  27.0 - 31.0 pg Final    MCHC 07/12/2024 31.7 (L)  32.0 - 36.0 g/dL Final    RDW 07/12/2024 13.4  11.5 - 14.5 % Final    Platelet Count 07/12/2024 296  150 - 400 K/uL Final    MPV 07/12/2024 10.5  9.4 - 12.4 fL Final    Neutrophils % 07/12/2024 49.2 (L)  53.0 - 65.0 % Final    Lymphocytes % 07/12/2024 41.2 (H)  27.0 - 41.0 % Final    Monocytes % 07/12/2024 6.8 (H)  2.0 - 6.0 % Final    Eosinophils % 07/12/2024 2.0  1.0 - 4.0 % Final    Basophils % 07/12/2024 0.6  0.0 - 1.0 % Final    Immature Granulocytes % 07/12/2024 0.2  0.0 - 0.4 % Final    nRBC, Auto 07/12/2024 0.0  <=0.0 % Final    Neutrophils, Abs 07/12/2024 3.96  1.80 - 7.70 K/uL Final    Lymphocytes, Absolute 07/12/2024 3.32  1.00 - 4.80 K/uL Final    Monocytes, Absolute 07/12/2024 0.55  0.00 - 0.80 K/uL Final    Eosinophils, Absolute 07/12/2024 0.16  0.00 - 0.50 K/uL Final    Basophils, Absolute 07/12/2024 0.05  0.00 - 0.20 K/uL Final    Immature Granulocytes, Absolute 07/12/2024 0.02  0.00 - 0.04 K/uL Final    nRBC, Absolute 07/12/2024 0.00  <=0.00 x10e3/uL Final    Diff Type 07/12/2024 Auto   Final   Office Visit on 06/25/2024   Component Date Value Ref Range Status    Final Diagnosis 06/25/2024    Final                    Value:This result contains rich text formatting which cannot be displayed here.    Comments 06/25/2024    Final                    Value:This result contains rich text formatting which cannot be displayed here.    Specimen A Procedure 06/25/2024 Punch Biopsy   Final    Specimen A Morphology 06/25/2024 annular plaques on bilateral forearms   Final     Specimen A DDx 06/25/2024 nummular eczema vs ACD vs Sarcoid   Final    Microscopic Description 06/25/2024    Final                    Value:This result contains rich text formatting which cannot be displayed here.    Gross Description 06/25/2024    Final                    Value:This result contains rich text formatting which cannot be displayed here.    Laboratory Notes 06/25/2024    Final                    Value:This result contains rich text formatting which cannot be displayed here.    Case Report 06/25/2024    Final                    Value:Surgical Pathology                                Case: [id number]                                   Authorizing Provider:  Ada Sy MD       Collected:           06/25/2024 03:16 PM          Ordering Location:     [facility] Received:            06/26/2024 09:21 AM          Pathologist:           Jarrod Verduzco MD                                                           Specimen:    Arm, Left, forearm                                                                        Admission on 06/18/2024, Discharged on 06/18/2024   Component Date Value Ref Range Status    POC Glucose 06/18/2024 264 (H)  70 - 105 mg/dL Final   Admission on 06/16/2024, Discharged on 06/16/2024   Component Date Value Ref Range Status    Color, UA 06/16/2024 Colorless  Colorless, Straw, Yellow, Light Yellow, Dark Yellow Final    Clarity, UA 06/16/2024 Clear  Clear Final    pH, UA 06/16/2024 5.5  5.0 to 8.0 pH Units Final    Leukocytes, UA 06/16/2024 Moderate (A)  Negative Final    Nitrites, UA 06/16/2024 Negative  Negative Final    Protein, UA 06/16/2024 Negative  Negative Final    Glucose, UA 06/16/2024 >1000 (A)  Normal mg/dL Final    Ketones, UA 06/16/2024 Negative  Negative mg/dL Final    Urobilinogen, UA 06/16/2024 Normal  0.2, 1.0, Normal mg/dL Final    Bilirubin, UA 06/16/2024 Negative  Negative Final    Blood, UA 06/16/2024 Negative  Negative Final    Specific Gravity,  UA 06/16/2024 1.037 (H)  <=1.030 Final    POC Preg Test, Ur 06/16/2024 Negative  Negative Final     Acceptable 06/16/2024 Yes   Final    WBC, UA 06/16/2024 19 (H)  <=5 /hpf Final    RBC, UA 06/16/2024 12 (H)  <=3 /hpf Final    Bacteria, UA 06/16/2024 Occasional (A)  None Seen /hpf Final    Squamous Epithelial Cells, UA 06/16/2024 Occasional (A)  None Seen /HPF Final    Yeast, UA 06/16/2024 Occasional (A)  None Seen /hpf Final    Mucous 06/16/2024 Occasional (A)  None Seen /LPF Final    Culture, Urine 06/16/2024 50,000 Streptococcus agalactiae (Group B) (A)   Final   There may be more visits with results that are not included.         No orders of the defined types were placed in this encounter.      Requested Prescriptions      No prescriptions requested or ordered in this encounter       Assessment:     1. Lumbosacral radiculopathy    2. Primary osteoarthritis involving multiple joints           A's of Opioid Risk Assessment  Activity:  Current medication helps perform ADL.   Analgesia:Patients pain is partially controlled by current medication. Patient has tried OTC medications such as Tylenol and Ibuprofen with out relief.   Adverse Effects: Patient denies constipation or sedation.  Aberrant Behavior:  reviewed with no aberrant drug seeking/taking behavior.  Overdose reversal drug naloxone discussed     Drug screen reviewed      MRI lumbar spine [facility] October 18, 2024  L4-L5: Mild disc bulge with right paracentral disc protrusion.  Mild facet arthropathy.  No significant central canal or foraminal narrowing.     L5-S1: There is diffuse disc protrusion more prominent/moderate on the left with involvement of the left neural foramen.  Mild central canal narrowing.  Moderate left foraminal narrowing.  This could affect descending bilateral S1 nerve roots left greater than right or possible exiting left L5 nerve root.  Recommend clinical correlation.     Paraspinal muscles & soft tissues:  Unremarkable.     Impression:     1. No acute abnormality  2. Degenerative changes of the lumbar spine predominantly at L5-S1 with associated disc protrusion more prominent on the left.  See above comments.  Follow-up recommended.  3. This report was flagged in Epic as abnormal.      Plan:      Follow-up after lumbar L5/S1 OMAR # 1 November 19, 2024  She states she had 85% relief after procedure  Procedure did help improve her level function    Procedure notes/fluoro images reviewed    She denies any loss of function weakness lower extremities    Denies loss of bowel or bladder function    She states she is very pleased with the outcome of for procedure did control her lumbar radiculopathy type discomfort     She would like to continue with conservative management     Continue home exercise program as directed     Follow-up as needed, patient request    Dr. Day November 2025    Bring original prescription medication bottles/container/box with labels to each visit

## 2024-12-05 ENCOUNTER — OFFICE VISIT (OUTPATIENT)
Dept: PAIN MEDICINE | Facility: CLINIC | Age: 44
End: 2024-12-05
Payer: COMMERCIAL

## 2024-12-05 VITALS
BODY MASS INDEX: 45.96 KG/M2 | HEIGHT: 66 IN | SYSTOLIC BLOOD PRESSURE: 142 MMHG | RESPIRATION RATE: 18 BRPM | WEIGHT: 286 LBS | DIASTOLIC BLOOD PRESSURE: 80 MMHG | HEART RATE: 105 BPM | OXYGEN SATURATION: 99 %

## 2024-12-05 DIAGNOSIS — M54.17 LUMBOSACRAL RADICULOPATHY: Primary | Chronic | ICD-10-CM

## 2024-12-05 DIAGNOSIS — M15.0 PRIMARY OSTEOARTHRITIS INVOLVING MULTIPLE JOINTS: Chronic | ICD-10-CM

## 2024-12-05 PROCEDURE — 3079F DIAST BP 80-89 MM HG: CPT | Mod: CPTII,,, | Performed by: PHYSICIAN ASSISTANT

## 2024-12-05 PROCEDURE — 99999 PR PBB SHADOW E&M-EST. PATIENT-LVL IV: CPT | Mod: PBBFAC,,, | Performed by: PHYSICIAN ASSISTANT

## 2024-12-05 PROCEDURE — 99214 OFFICE O/P EST MOD 30 MIN: CPT | Mod: PBBFAC | Performed by: PHYSICIAN ASSISTANT

## 2024-12-05 PROCEDURE — 3008F BODY MASS INDEX DOCD: CPT | Mod: CPTII,,, | Performed by: PHYSICIAN ASSISTANT

## 2024-12-05 PROCEDURE — 3077F SYST BP >= 140 MM HG: CPT | Mod: CPTII,,, | Performed by: PHYSICIAN ASSISTANT

## 2024-12-05 PROCEDURE — 1159F MED LIST DOCD IN RCRD: CPT | Mod: CPTII,,, | Performed by: PHYSICIAN ASSISTANT

## 2024-12-05 PROCEDURE — 3046F HEMOGLOBIN A1C LEVEL >9.0%: CPT | Mod: CPTII,,, | Performed by: PHYSICIAN ASSISTANT

## 2024-12-05 PROCEDURE — 3066F NEPHROPATHY DOC TX: CPT | Mod: CPTII,,, | Performed by: PHYSICIAN ASSISTANT

## 2024-12-05 PROCEDURE — 3060F POS MICROALBUMINURIA REV: CPT | Mod: CPTII,,, | Performed by: PHYSICIAN ASSISTANT

## 2024-12-05 PROCEDURE — 99213 OFFICE O/P EST LOW 20 MIN: CPT | Mod: S$PBB,,, | Performed by: PHYSICIAN ASSISTANT

## 2024-12-05 PROCEDURE — 4010F ACE/ARB THERAPY RXD/TAKEN: CPT | Mod: CPTII,,, | Performed by: PHYSICIAN ASSISTANT

## 2025-01-13 ENCOUNTER — OFFICE VISIT (OUTPATIENT)
Dept: FAMILY MEDICINE | Facility: CLINIC | Age: 45
End: 2025-01-13
Payer: COMMERCIAL

## 2025-01-13 VITALS
BODY MASS INDEX: 45.64 KG/M2 | DIASTOLIC BLOOD PRESSURE: 80 MMHG | OXYGEN SATURATION: 97 % | SYSTOLIC BLOOD PRESSURE: 114 MMHG | RESPIRATION RATE: 18 BRPM | TEMPERATURE: 98 F | HEART RATE: 92 BPM | HEIGHT: 66 IN | WEIGHT: 284 LBS

## 2025-01-13 DIAGNOSIS — E78.2 MIXED HYPERLIPIDEMIA: ICD-10-CM

## 2025-01-13 DIAGNOSIS — Z79.4 TYPE 2 DIABETES MELLITUS WITHOUT COMPLICATION, WITH LONG-TERM CURRENT USE OF INSULIN: ICD-10-CM

## 2025-01-13 DIAGNOSIS — I10 PRIMARY HYPERTENSION: ICD-10-CM

## 2025-01-13 DIAGNOSIS — E11.9 TYPE 2 DIABETES MELLITUS WITHOUT COMPLICATION, WITH LONG-TERM CURRENT USE OF INSULIN: ICD-10-CM

## 2025-01-13 DIAGNOSIS — Z00.00 ROUTINE GENERAL MEDICAL EXAMINATION AT A HEALTH CARE FACILITY: Primary | ICD-10-CM

## 2025-01-13 LAB
ALBUMIN SERPL BCP-MCNC: 3.8 G/DL (ref 3.5–5)
ALBUMIN/GLOB SERPL: 0.9 {RATIO}
ALP SERPL-CCNC: 141 U/L (ref 40–150)
ALT SERPL W P-5'-P-CCNC: 18 U/L
ANION GAP SERPL CALCULATED.3IONS-SCNC: 15 MMOL/L (ref 7–16)
AST SERPL W P-5'-P-CCNC: 26 U/L (ref 5–34)
BASOPHILS # BLD AUTO: 0.05 K/UL (ref 0–0.2)
BASOPHILS NFR BLD AUTO: 0.6 % (ref 0–1)
BILIRUB SERPL-MCNC: 0.7 MG/DL
BUN SERPL-MCNC: 11 MG/DL (ref 7–19)
BUN/CREAT SERPL: 12 (ref 6–20)
CALCIUM SERPL-MCNC: 9.3 MG/DL (ref 8.4–10.2)
CHLORIDE SERPL-SCNC: 101 MMOL/L (ref 98–107)
CHOLEST SERPL-MCNC: 129 MG/DL
CHOLEST/HDLC SERPL: 2.2 {RATIO}
CO2 SERPL-SCNC: 26 MMOL/L (ref 22–29)
CREAT SERPL-MCNC: 0.91 MG/DL (ref 0.55–1.02)
CREAT UR-MCNC: 64 MG/DL (ref 15–325)
DIFFERENTIAL METHOD BLD: ABNORMAL
EGFR (NO RACE VARIABLE) (RUSH/TITUS): 81 ML/MIN/1.73M2
EOSINOPHIL # BLD AUTO: 0.11 K/UL (ref 0–0.5)
EOSINOPHIL NFR BLD AUTO: 1.3 % (ref 1–4)
ERYTHROCYTE [DISTWIDTH] IN BLOOD BY AUTOMATED COUNT: 13.5 % (ref 11.5–14.5)
EST. AVERAGE GLUCOSE BLD GHB EST-MCNC: 246 MG/DL
GLOBULIN SER-MCNC: 4.4 G/DL (ref 2–4)
GLUCOSE SERPL-MCNC: 207 MG/DL (ref 74–100)
HBA1C MFR BLD HPLC: 10.2 %
HCT VFR BLD AUTO: 47.4 % (ref 38–47)
HDLC SERPL-MCNC: 60 MG/DL (ref 35–60)
HGB BLD-MCNC: 14.7 G/DL (ref 12–16)
IMM GRANULOCYTES # BLD AUTO: 0.03 K/UL (ref 0–0.04)
IMM GRANULOCYTES NFR BLD: 0.4 % (ref 0–0.4)
LDLC SERPL CALC-MCNC: 57 MG/DL
LDLC/HDLC SERPL: 1 {RATIO}
LYMPHOCYTES # BLD AUTO: 3.15 K/UL (ref 1–4.8)
LYMPHOCYTES NFR BLD AUTO: 37.4 % (ref 27–41)
MCH RBC QN AUTO: 29.6 PG (ref 27–31)
MCHC RBC AUTO-ENTMCNC: 31 G/DL (ref 32–36)
MCV RBC AUTO: 95.4 FL (ref 80–96)
MICROALBUMIN UR-MCNC: 2.7 MG/DL
MICROALBUMIN/CREAT RATIO PNL UR: 42.2 MG/G (ref 0–30)
MONOCYTES # BLD AUTO: 0.49 K/UL (ref 0–0.8)
MONOCYTES NFR BLD AUTO: 5.8 % (ref 2–6)
MPC BLD CALC-MCNC: 10.7 FL (ref 9.4–12.4)
NEUTROPHILS # BLD AUTO: 4.59 K/UL (ref 1.8–7.7)
NEUTROPHILS NFR BLD AUTO: 54.5 % (ref 53–65)
NONHDLC SERPL-MCNC: 69 MG/DL
NRBC # BLD AUTO: 0 X10E3/UL
NRBC, AUTO (.00): 0 %
PLATELET # BLD AUTO: 335 K/UL (ref 150–400)
POTASSIUM SERPL-SCNC: 4 MMOL/L (ref 3.5–5.1)
PROT SERPL-MCNC: 8.2 G/DL (ref 6.4–8.3)
RBC # BLD AUTO: 4.97 M/UL (ref 4.2–5.4)
SODIUM SERPL-SCNC: 138 MMOL/L (ref 136–145)
TRIGL SERPL-MCNC: 62 MG/DL (ref 37–140)
TSH SERPL DL<=0.005 MIU/L-ACNC: 2.08 UIU/ML (ref 0.35–4.94)
VLDLC SERPL-MCNC: 12 MG/DL
WBC # BLD AUTO: 8.42 K/UL (ref 4.5–11)

## 2025-01-13 PROCEDURE — 3008F BODY MASS INDEX DOCD: CPT | Mod: CPTII,,, | Performed by: NURSE PRACTITIONER

## 2025-01-13 PROCEDURE — 82043 UR ALBUMIN QUANTITATIVE: CPT | Mod: ,,, | Performed by: CLINICAL MEDICAL LABORATORY

## 2025-01-13 PROCEDURE — 83036 HEMOGLOBIN GLYCOSYLATED A1C: CPT | Mod: ,,, | Performed by: CLINICAL MEDICAL LABORATORY

## 2025-01-13 PROCEDURE — 80050 GENERAL HEALTH PANEL: CPT | Mod: ,,, | Performed by: CLINICAL MEDICAL LABORATORY

## 2025-01-13 PROCEDURE — 1160F RVW MEDS BY RX/DR IN RCRD: CPT | Mod: CPTII,,, | Performed by: NURSE PRACTITIONER

## 2025-01-13 PROCEDURE — 82570 ASSAY OF URINE CREATININE: CPT | Mod: ,,, | Performed by: CLINICAL MEDICAL LABORATORY

## 2025-01-13 PROCEDURE — 80061 LIPID PANEL: CPT | Mod: ,,, | Performed by: CLINICAL MEDICAL LABORATORY

## 2025-01-13 PROCEDURE — 1159F MED LIST DOCD IN RCRD: CPT | Mod: CPTII,,, | Performed by: NURSE PRACTITIONER

## 2025-01-13 PROCEDURE — 3074F SYST BP LT 130 MM HG: CPT | Mod: CPTII,,, | Performed by: NURSE PRACTITIONER

## 2025-01-13 PROCEDURE — 3079F DIAST BP 80-89 MM HG: CPT | Mod: CPTII,,, | Performed by: NURSE PRACTITIONER

## 2025-01-13 PROCEDURE — 99396 PREV VISIT EST AGE 40-64: CPT | Mod: ,,, | Performed by: NURSE PRACTITIONER

## 2025-01-13 NOTE — PROGRESS NOTES
Subjective     Patient ID: Ashly Trent is a 44 y.o. female.    Chief Complaint: Diabetes, Headache (3 month f/u. Having headaches x 3 weeks.), and Annual Exam    Pt presents for a wellness visit. Pt reports a headache x 3 weeks.       Review of Systems   Constitutional:  Negative for activity change, appetite change, chills, fatigue and fever.   HENT:  Negative for nasal congestion, ear discharge, nosebleeds, postnasal drip, rhinorrhea, sinus pressure/congestion, sneezing, sore throat and tinnitus.    Eyes:  Negative for pain, discharge, redness and itching.   Respiratory:  Negative for cough, choking, chest tightness, shortness of breath and wheezing.    Cardiovascular:  Negative for chest pain.   Gastrointestinal:  Negative for abdominal distention, abdominal pain, blood in stool, change in bowel habit, constipation, diarrhea, nausea and vomiting.   Genitourinary:  Negative for decreased urine volume, dysuria, flank pain, frequency, menstrual irregularity and menstrual problem.   Musculoskeletal:  Negative for back pain and gait problem.   Integumentary:  Negative for wound, breast mass and breast discharge.   Allergic/Immunologic: Negative for immunocompromised state.   Neurological:  Negative for dizziness, light-headedness and headaches.   Psychiatric/Behavioral:  Negative for agitation, behavioral problems and hallucinations.    Breast: Negative for mass         Objective     Physical Exam  Vitals and nursing note reviewed.   Constitutional:       Appearance: Normal appearance.   Cardiovascular:      Rate and Rhythm: Normal rate and regular rhythm.      Heart sounds: Normal heart sounds.   Pulmonary:      Effort: Pulmonary effort is normal.      Breath sounds: Normal breath sounds.   Musculoskeletal:         General: Normal range of motion.   Neurological:      Mental Status: She is alert and oriented to person, place, and time.   Psychiatric:         Mood and Affect: Mood normal.         Behavior:  Behavior normal.            Assessment and Plan     1. Routine general medical examination at a health care facility    2. Type 2 diabetes mellitus without complication, with long-term current use of insulin  -     Microalbumin/Creatinine Ratio, Urine; Future; Expected date: 01/13/2025  -     Hemoglobin A1C; Future; Expected date: 01/13/2025  -     CBC Auto Differential; Future; Expected date: 01/13/2025  -     Comprehensive Metabolic Panel; Future; Expected date: 01/13/2025  -     TSH; Future; Expected date: 01/13/2025  -     Lipid Panel; Future; Expected date: 01/13/2025    3. Mixed hyperlipidemia    4. Primary hypertension    5. BMI 45.0-49.9, adult        Will call pt with lab results. Alternate tylenol and aleve for the headache.          Follow up in about 3 months (around 4/13/2025).

## 2025-01-14 ENCOUNTER — PATIENT MESSAGE (OUTPATIENT)
Dept: FAMILY MEDICINE | Facility: CLINIC | Age: 45
End: 2025-01-14
Payer: COMMERCIAL

## 2025-01-23 ENCOUNTER — HOSPITAL ENCOUNTER (OUTPATIENT)
Dept: RADIOLOGY | Facility: HOSPITAL | Age: 45
Discharge: HOME | End: 2025-01-23
Attending: NURSE PRACTITIONER
Payer: COMMERCIAL

## 2025-01-23 VITALS — BODY MASS INDEX: 45.64 KG/M2 | HEIGHT: 66 IN | WEIGHT: 284 LBS

## 2025-01-23 DIAGNOSIS — Z12.31 OTHER SCREENING MAMMOGRAM: ICD-10-CM

## 2025-01-23 PROCEDURE — 77063 BREAST TOMOSYNTHESIS BI: CPT | Mod: TC

## 2025-01-23 PROCEDURE — 77063 BREAST TOMOSYNTHESIS BI: CPT | Mod: 26,,, | Performed by: RADIOLOGY

## 2025-01-23 PROCEDURE — 77067 SCR MAMMO BI INCL CAD: CPT | Mod: 26,,, | Performed by: RADIOLOGY

## 2025-01-23 RX ORDER — DAPAGLIFLOZIN 10 MG/1
10 TABLET, FILM COATED ORAL DAILY
Qty: 90 TABLET | Refills: 1 | Status: SHIPPED | OUTPATIENT
Start: 2025-01-23 | End: 2025-01-30 | Stop reason: SDUPTHER

## 2025-01-30 ENCOUNTER — TELEPHONE (OUTPATIENT)
Dept: PHARMACY | Facility: CLINIC | Age: 45
End: 2025-01-30
Payer: COMMERCIAL

## 2025-01-30 ENCOUNTER — OFFICE VISIT (OUTPATIENT)
Dept: DIABETES SERVICES | Facility: CLINIC | Age: 45
End: 2025-01-30
Payer: COMMERCIAL

## 2025-01-30 VITALS
OXYGEN SATURATION: 99 % | RESPIRATION RATE: 18 BRPM | HEIGHT: 66 IN | BODY MASS INDEX: 45.8 KG/M2 | HEART RATE: 93 BPM | SYSTOLIC BLOOD PRESSURE: 122 MMHG | DIASTOLIC BLOOD PRESSURE: 78 MMHG | WEIGHT: 285 LBS

## 2025-01-30 DIAGNOSIS — Z79.4 TYPE 2 DIABETES MELLITUS WITHOUT COMPLICATION, WITH LONG-TERM CURRENT USE OF INSULIN: ICD-10-CM

## 2025-01-30 DIAGNOSIS — F32.A DEPRESSION, UNSPECIFIED DEPRESSION TYPE: ICD-10-CM

## 2025-01-30 DIAGNOSIS — Z79.4 TYPE 2 DIABETES MELLITUS WITH HYPERGLYCEMIA, WITH LONG-TERM CURRENT USE OF INSULIN: Primary | ICD-10-CM

## 2025-01-30 DIAGNOSIS — E66.813 CLASS 3 SEVERE OBESITY WITH BODY MASS INDEX (BMI) OF 45.0 TO 49.9 IN ADULT, UNSPECIFIED OBESITY TYPE, UNSPECIFIED WHETHER SERIOUS COMORBIDITY PRESENT: ICD-10-CM

## 2025-01-30 DIAGNOSIS — I10 PRIMARY HYPERTENSION: ICD-10-CM

## 2025-01-30 DIAGNOSIS — E11.65 TYPE 2 DIABETES MELLITUS WITH HYPERGLYCEMIA, WITH LONG-TERM CURRENT USE OF INSULIN: Primary | ICD-10-CM

## 2025-01-30 DIAGNOSIS — E78.2 MIXED HYPERLIPIDEMIA: ICD-10-CM

## 2025-01-30 DIAGNOSIS — E66.01 CLASS 3 SEVERE OBESITY WITH BODY MASS INDEX (BMI) OF 45.0 TO 49.9 IN ADULT, UNSPECIFIED OBESITY TYPE, UNSPECIFIED WHETHER SERIOUS COMORBIDITY PRESENT: ICD-10-CM

## 2025-01-30 DIAGNOSIS — E11.9 TYPE 2 DIABETES MELLITUS WITHOUT COMPLICATION, WITH LONG-TERM CURRENT USE OF INSULIN: ICD-10-CM

## 2025-01-30 LAB — GLUCOSE SERPL-MCNC: 177 MG/DL (ref 70–110)

## 2025-01-30 PROCEDURE — 3078F DIAST BP <80 MM HG: CPT | Mod: CPTII,,, | Performed by: NURSE PRACTITIONER

## 2025-01-30 PROCEDURE — 99214 OFFICE O/P EST MOD 30 MIN: CPT | Mod: PBBFAC | Performed by: NURSE PRACTITIONER

## 2025-01-30 PROCEDURE — 99999 PR PBB SHADOW E&M-EST. PATIENT-LVL IV: CPT | Mod: PBBFAC,,, | Performed by: NURSE PRACTITIONER

## 2025-01-30 PROCEDURE — 1159F MED LIST DOCD IN RCRD: CPT | Mod: CPTII,,, | Performed by: NURSE PRACTITIONER

## 2025-01-30 PROCEDURE — 3074F SYST BP LT 130 MM HG: CPT | Mod: CPTII,,, | Performed by: NURSE PRACTITIONER

## 2025-01-30 PROCEDURE — 3008F BODY MASS INDEX DOCD: CPT | Mod: CPTII,,, | Performed by: NURSE PRACTITIONER

## 2025-01-30 PROCEDURE — 1160F RVW MEDS BY RX/DR IN RCRD: CPT | Mod: CPTII,,, | Performed by: NURSE PRACTITIONER

## 2025-01-30 PROCEDURE — 3060F POS MICROALBUMINURIA REV: CPT | Mod: CPTII,,, | Performed by: NURSE PRACTITIONER

## 2025-01-30 PROCEDURE — 82962 GLUCOSE BLOOD TEST: CPT | Mod: PBBFAC | Performed by: NURSE PRACTITIONER

## 2025-01-30 PROCEDURE — 3066F NEPHROPATHY DOC TX: CPT | Mod: CPTII,,, | Performed by: NURSE PRACTITIONER

## 2025-01-30 PROCEDURE — 99999PBSHW POCT GLUCOSE, HAND-HELD DEVICE: Mod: PBBFAC,,,

## 2025-01-30 PROCEDURE — 99214 OFFICE O/P EST MOD 30 MIN: CPT | Mod: S$PBB,,, | Performed by: NURSE PRACTITIONER

## 2025-01-30 PROCEDURE — 3046F HEMOGLOBIN A1C LEVEL >9.0%: CPT | Mod: CPTII,,, | Performed by: NURSE PRACTITIONER

## 2025-01-30 RX ORDER — INSULIN DEGLUDEC 100 U/ML
20 INJECTION, SOLUTION SUBCUTANEOUS NIGHTLY
Qty: 6 ML | Refills: 11 | Status: SHIPPED | OUTPATIENT
Start: 2025-01-30 | End: 2026-01-30

## 2025-01-30 RX ORDER — INSULIN DEGLUDEC 100 U/ML
10 INJECTION, SOLUTION SUBCUTANEOUS NIGHTLY
Qty: 3 ML | Refills: 11 | Status: SHIPPED | OUTPATIENT
Start: 2025-01-30 | End: 2025-01-30

## 2025-01-30 RX ORDER — DAPAGLIFLOZIN 10 MG/1
10 TABLET, FILM COATED ORAL DAILY
Qty: 90 TABLET | Refills: 1 | Status: SHIPPED | OUTPATIENT
Start: 2025-01-30 | End: 2025-07-29

## 2025-01-30 RX ORDER — SEMAGLUTIDE 1.34 MG/ML
1 INJECTION, SOLUTION SUBCUTANEOUS
Qty: 9 ML | Refills: 3 | Status: SHIPPED | OUTPATIENT
Start: 2025-01-30

## 2025-01-30 RX ORDER — FLUCONAZOLE 150 MG/1
150 TABLET ORAL DAILY
Qty: 2 TABLET | Refills: 2 | Status: SHIPPED | OUTPATIENT
Start: 2025-01-30 | End: 2025-02-05

## 2025-01-30 NOTE — PROGRESS NOTES
Subjective:         Patient ID: Ashly Trent is a 44 y.o. female.  Patient's current PCP is Rosaura Manzanares FNP.     Chief Complaint: Diabetes Mellitus (Patient is here for 3 month follow up and glucose check. She is checking glucose once daily. No patient complaints. )    HPI  Ashly Trent is a 44 y.o. Other female presenting for a follow up for diabetes. Patient has been diagnosed with type 2 diabetes for several years.    Received diabetes education: yes     CURRENT DM MEDICATIONS:   Diabetes Medications               dapagliflozin propanediol (FARXIGA) 10 mg tablet Take 1 tablet (10 mg total) by mouth once daily.    insulin degludec (TRESIBA FLEXTOUCH U-100) 100 unit/mL (3 mL) insulin pen Inject 10 Units into the skin every evening.    semaglutide (OZEMPIC) 1 mg/dose (4 mg/3 mL) Inject 1 mg into the skin every 7 days.              Past failed treatment include:     Blood glucose testing is performed regularly. Patient is testing 1 times per day. She checks her glucose in the AM--- 190-220s   Meter:   Preferred lab: [facility]     Any episodes of hypoglycemia? no     Complications related to diabetes: peripheral neuropathy and cardiovascular disease    Her blood sugar in the clinic today was:   Lab Results   Component Value Date    POCGLU 177 (A) 01/30/2025       Ashly Trent presents today for follow up visit to discuss diabetes management.   She does yeast infections-- discussed the two wipe method.   She has been taking the ozempic 0.25mg or 0.5mg of the ozempic.     Discussed diet, exercise, and medication changes.   She does not follow a diabetic diet.       Current diet: does not follow a diabetic diet-- 2 meals a day   Activity Level: sedentary     Lab Results   Component Value Date    HGBA1C 10.2 (H) 01/13/2025    HGBA1C 11.4 (H) 10/11/2024    HGBA1C 11.1 (H) 07/12/2024       STANDARDS OF CARE  Diabetes Management Status    Statin: Taking  ACE/ARB: Taking    Screening or Prevention Patient's value  "Goal Complete/Controlled?   HgA1C Testing and Control   Lab Results   Component Value Date    HGBA1C 10.2 (H) 01/13/2025      Annually/Less than 8% No   Lipid profile : 01/13/2025 Annually Yes   LDL control Lab Results   Component Value Date    LDLCALC 57 01/13/2025    Annually/Less than 100 mg/dl  Yes   Nephropathy screening Lab Results   Component Value Date    LABMICR 42.2 (H) 01/13/2025     Lab Results   Component Value Date    PROTEINUA Negative 06/16/2024     No results found for: "UTPCR"   Annually Yes   Blood pressure BP Readings from Last 1 Encounters:   01/30/25 122/78    Less than 140/90 Yes   Dilated retinal exam : 08/14/2024 Annually Yes   Foot exam   : 07/12/2024 Annually Yes          Labs reviewed and are noted below.    Lab Results   Component Value Date    WBC 8.42 01/13/2025    HGB 14.7 01/13/2025    HCT 47.4 (H) 01/13/2025     01/13/2025    CHOL 129 01/13/2025    TRIG 62 01/13/2025    HDL 60 01/13/2025    LDLCALC 57 01/13/2025    ALT 18 01/13/2025    AST 26 01/13/2025     01/13/2025    K 4.0 01/13/2025     01/13/2025    ANIONGAP 15 01/13/2025    CREATININE 0.91 01/13/2025    ESTGFRAFRICA 98 09/22/2021    EGFRNONAA 63 02/17/2022    BUN 11 01/13/2025    CO2 26 01/13/2025    TSH 2.075 01/13/2025    INR 1.04 09/12/2024     (H) 01/13/2025    MICROALBUR 2.7 01/13/2025     Lab Results   Component Value Date    TSH 2.075 01/13/2025    CALCIUM 9.3 01/13/2025     No results found for: "CPEPTIDE"  No results found for: "GLUTAMICACID"  Glucose   Date Value Ref Range Status   01/13/2025 207 (H) 74 - 100 mg/dL Final     Anion Gap   Date Value Ref Range Status   01/13/2025 15 7 - 16 mmol/L Final     eGFR    Date Value Ref Range Status   09/22/2021 98 >=60 mL/min/1.73m² Final     eGFR   Date Value Ref Range Status   02/17/2022 63 >=60 mL/min/1.73m² Final       The following portions of the patient's history were reviewed and updated as appropriate: allergies, current " medications, past family history, past medical history, past social history, past surgical history, and problem list.    Review of patient's allergies indicates:   Allergen Reactions    Amoxicillin Rash     Social History     Socioeconomic History    Marital status: Single   Tobacco Use    Smoking status: Never    Smokeless tobacco: Never   Substance and Sexual Activity    Alcohol use: Yes    Drug use: Not Currently    Sexual activity: Yes     Social Drivers of Health     Financial Resource Strain: Low Risk  (10/4/2024)    Overall Financial Resource Strain (CARDIA)     Difficulty of Paying Living Expenses: Not very hard   Food Insecurity: No Food Insecurity (11/27/2024)    Received from [facility]    Hunger Vital Sign     Worried About Running Out of Food in the Last Year: Never true     Ran Out of Food in the Last Year: Never true   Transportation Needs: No Transportation Needs (11/27/2024)    Received from [facility]    PRAPARE - Transportation     Lack of Transportation (Medical): No     Lack of Transportation (Non-Medical): No   Physical Activity: Sufficiently Active (11/27/2024)    Received from [facility]    Exercise Vital Sign     Days of Exercise per Week: 3 days     Minutes of Exercise per Session: 130 min   Recent Concern: Physical Activity - Insufficiently Active (10/4/2024)    Exercise Vital Sign     Days of Exercise per Week: 5 days     Minutes of Exercise per Session: 10 min   Stress: No Stress Concern Present (11/27/2024)    Received from [facility]    [employer] - Occupational Stress Questionnaire     Feeling of Stress : Not at all   Housing Stability: Low Risk  (11/27/2024)    Received from [facility]    Housing Stability Vital Sign     Unable to Pay for Housing in the Last Year: No     Number of Times Moved in the Last  Year: 0     Homeless in the Last Year: No     Past Medical History:   Diagnosis Date    Acid reflux     Diabetes mellitus, type 2     HTN (hypertension)     Hyperlipidemia     Migraine with aura     Obesity        REVIEW OF SYSTEMS:  Eyes No history of DR.  Cardiovascular: History of HTN and HLD   GI: Denies nausea,vomiting,constipation,or diarrhea.  : Denies dysuria.  SKIN: Denies rashes and lesions.  Neuro: No neuropathy.  PSYCH: No tobacco use.  ENDO: See HPI.        Objective:      Vitals:    01/30/25 0805   BP: 122/78   Pulse: 93   Resp: 18     RESPIRATORY: No respiratory distress  NEUROLOGIC: Cranial nerves II-XII grossly intact.   PSYCHIATRIC: Alert & oriented x3. Normal mood and affect.  FOOT EXAMINATION: pulses present with no edema noted   Assessment:       1. Type 2 diabetes mellitus with hyperglycemia, with long-term current use of insulin    2. Depression, unspecified depression type    3. Primary hypertension    4. Mixed hyperlipidemia    5. Class 3 severe obesity with body mass index (BMI) of 45.0 to 49.9 in adult, unspecified obesity type, unspecified whether serious comorbidity present    6. Type 2 diabetes mellitus without complication, with long-term current use of insulin        Plan:   Type 2 diabetes mellitus with hyperglycemia, with long-term current use of insulin  -     POCT Glucose, Hand-Held Device  -     Discontinue: insulin degludec (TRESIBA FLEXTOUCH U-100) 100 unit/mL (3 mL) insulin pen; Inject 10 Units into the skin every evening.  Dispense: 3 mL; Refill: 11  -     dapagliflozin propanediol (FARXIGA) 10 mg tablet; Take 1 tablet (10 mg total) by mouth once daily.  Dispense: 90 tablet; Refill: 1  -     fluconazole (DIFLUCAN) 150 MG Tab; Take 1 tablet (150 mg total) by mouth once daily  Dispense: 2 tablet; Refill: 2  -     insulin degludec (TRESIBA FLEXTOUCH U-100) 100 unit/mL (3 mL) insulin pen; Inject 20 Units into the skin every evening.  Dispense: 6 mL; Refill: 11  -      "semaglutide (OZEMPIC) 1 mg/dose (4 mg/3 mL); Inject 1 mg into the skin every 7 days.  Dispense: 9 mL; Refill: 3  - discussed diet and exercise  - discussed medication changes     Depression, unspecified depression type  - noted     Primary hypertension  - noted     Mixed hyperlipidemia  - noted     Class 3 severe obesity with body mass index (BMI) of 45.0 to 49.9 in adult, unspecified obesity type, unspecified whether serious comorbidity present  - noted           - Follow up: 3 months      Portions of this note may have been created with voice recognition software. Occasional "wrong-word" or "sound-a-like" substitutions may have occurred due to the inherent limitations of voice recognition software. Please, read the note carefully and recognize, using context, where substitutions have occurred.         Maggi DENNISON/IGNACIO  [facility]     "

## 2025-01-30 NOTE — PATIENT INSTRUCTIONS
-- Medications adjusted for today's visit include:    Continue your farxiga     Increase your ozempic from 0.5mg to 1mg weekly     Increase your tresiba from 10 units nighty to 12units tonight and then continue to increase by 2 units every other night until your glucose in the AM when you first wake up is less than 120  GOAL RANGE is  in the AM when you get up.     -- Limit carbs to no more than 30-45 grams with each meal. Never eat carbs by themselves, always add protein. Make snacks low carb or non-carb only.    -- Continue checking blood sugar 3 times daily: Fasting blood sugar and vary your next 2 readings: Before lunch, before supper, 2 hours after any meal, or bedtime.   -Blood sugar goals should be a fasting blood sugar between , and no blood sugars throughout the day over 180 is good, less than 160 better, less than 140 perfect.    --Carry glucose tablets/soft peppermints/regular juice or Coke product with you at all times to treat a low blood sugar episode (less than 70). If your blood sugar is between 50-70, Chew 4 tablets or drink 1/2 cup of juice or regular Coke product. If your blood sugar is below 50, double the treatment. Re-check blood sugar in 15 minutes. If still low, repeat this. Always call the clinic to give an update for any low blood sugar episodes.    --Exercise as tolerated: Goal 30 minutes/day, 5 days/week. Start slowly and increase as tolerated.    --Follow-up for your next visit in 3 months     --Please either drop off, fax, or MyChart your readings to me as needed.

## 2025-02-13 ENCOUNTER — TELEPHONE (OUTPATIENT)
Dept: DIABETES SERVICES | Facility: CLINIC | Age: 45
End: 2025-02-13
Payer: COMMERCIAL

## 2025-03-06 ENCOUNTER — TELEPHONE (OUTPATIENT)
Dept: DIABETES SERVICES | Facility: CLINIC | Age: 45
End: 2025-03-06
Payer: COMMERCIAL

## 2025-03-06 NOTE — TELEPHONE ENCOUNTER
I attempted to contact patient regarding our last conversation on 2/20/25, to see how her fasting am glucose has been since Maggi DENNISON/IGNACIO increased her Tresiba insulin to 24 units. Patient did not answer.

## 2025-04-15 ENCOUNTER — OFFICE VISIT (OUTPATIENT)
Dept: FAMILY MEDICINE | Facility: CLINIC | Age: 45
End: 2025-04-15
Payer: COMMERCIAL

## 2025-04-15 VITALS
BODY MASS INDEX: 45.96 KG/M2 | HEIGHT: 66 IN | OXYGEN SATURATION: 97 % | DIASTOLIC BLOOD PRESSURE: 80 MMHG | WEIGHT: 286 LBS | SYSTOLIC BLOOD PRESSURE: 132 MMHG | HEART RATE: 98 BPM | TEMPERATURE: 98 F

## 2025-04-15 DIAGNOSIS — E78.2 MIXED HYPERLIPIDEMIA: ICD-10-CM

## 2025-04-15 DIAGNOSIS — E11.9 TYPE 2 DIABETES MELLITUS WITHOUT COMPLICATION, WITH LONG-TERM CURRENT USE OF INSULIN: Primary | ICD-10-CM

## 2025-04-15 DIAGNOSIS — I10 PRIMARY HYPERTENSION: ICD-10-CM

## 2025-04-15 DIAGNOSIS — Z79.4 TYPE 2 DIABETES MELLITUS WITHOUT COMPLICATION, WITH LONG-TERM CURRENT USE OF INSULIN: Primary | ICD-10-CM

## 2025-04-15 LAB
EST. AVERAGE GLUCOSE BLD GHB EST-MCNC: 229 MG/DL
HBA1C MFR BLD HPLC: 9.6 %

## 2025-04-15 PROCEDURE — 3075F SYST BP GE 130 - 139MM HG: CPT | Mod: CPTII,,, | Performed by: NURSE PRACTITIONER

## 2025-04-15 PROCEDURE — 83036 HEMOGLOBIN GLYCOSYLATED A1C: CPT | Mod: ,,, | Performed by: CLINICAL MEDICAL LABORATORY

## 2025-04-15 PROCEDURE — 1159F MED LIST DOCD IN RCRD: CPT | Mod: CPTII,,, | Performed by: NURSE PRACTITIONER

## 2025-04-15 PROCEDURE — 1160F RVW MEDS BY RX/DR IN RCRD: CPT | Mod: CPTII,,, | Performed by: NURSE PRACTITIONER

## 2025-04-15 PROCEDURE — 3046F HEMOGLOBIN A1C LEVEL >9.0%: CPT | Mod: CPTII,,, | Performed by: NURSE PRACTITIONER

## 2025-04-15 PROCEDURE — 3066F NEPHROPATHY DOC TX: CPT | Mod: CPTII,,, | Performed by: NURSE PRACTITIONER

## 2025-04-15 PROCEDURE — 3079F DIAST BP 80-89 MM HG: CPT | Mod: CPTII,,, | Performed by: NURSE PRACTITIONER

## 2025-04-15 PROCEDURE — 3008F BODY MASS INDEX DOCD: CPT | Mod: CPTII,,, | Performed by: NURSE PRACTITIONER

## 2025-04-15 PROCEDURE — 4010F ACE/ARB THERAPY RXD/TAKEN: CPT | Mod: CPTII,,, | Performed by: NURSE PRACTITIONER

## 2025-04-15 PROCEDURE — 99213 OFFICE O/P EST LOW 20 MIN: CPT | Mod: ,,, | Performed by: NURSE PRACTITIONER

## 2025-04-15 PROCEDURE — 3060F POS MICROALBUMINURIA REV: CPT | Mod: CPTII,,, | Performed by: NURSE PRACTITIONER

## 2025-04-15 NOTE — PROGRESS NOTES
Subjective     Patient ID: Ashly Trent is a 44 y.o. female.    Chief Complaint: 3 Month fu for Type 2 diabetes mellitus and Health Maintenance (TETANUS VACCINE Never done/Hemoglobin A1c due on 04/13/2025 )    Pt presents for a diabetes follow-up.       Review of Systems   Constitutional:  Negative for activity change, appetite change, chills, fatigue and fever.   HENT:  Negative for nasal congestion, ear discharge, nosebleeds, postnasal drip, rhinorrhea, sinus pressure/congestion, sneezing, sore throat and tinnitus.    Eyes:  Negative for pain, discharge, redness and itching.   Respiratory:  Negative for cough, choking, chest tightness, shortness of breath and wheezing.    Cardiovascular:  Negative for chest pain.   Gastrointestinal:  Negative for abdominal distention, abdominal pain, blood in stool, change in bowel habit, constipation, diarrhea, nausea and vomiting.   Genitourinary:  Negative for decreased urine volume, dysuria, flank pain, frequency, menstrual irregularity and menstrual problem.   Musculoskeletal:  Negative for back pain and gait problem.   Integumentary:  Negative for wound, breast mass and breast discharge.   Allergic/Immunologic: Negative for immunocompromised state.   Neurological:  Negative for dizziness, light-headedness and headaches.   Psychiatric/Behavioral:  Negative for agitation, behavioral problems and hallucinations.    Breast: Negative for mass         Objective     Physical Exam  Vitals and nursing note reviewed.   Constitutional:       Appearance: Normal appearance.   Cardiovascular:      Rate and Rhythm: Normal rate and regular rhythm.      Heart sounds: Normal heart sounds.   Pulmonary:      Effort: Pulmonary effort is normal.      Breath sounds: Normal breath sounds.   Musculoskeletal:         General: Normal range of motion.   Neurological:      Mental Status: She is alert and oriented to person, place, and time.   Psychiatric:         Mood and Affect: Mood normal.          Behavior: Behavior normal.            Assessment and Plan     1. Type 2 diabetes mellitus without complication, with long-term current use of insulin  -     Hemoglobin A1C; Future; Expected date: 04/15/2025  Keep appts with Maggi Sunshine  Last hga1c was uncontrolled     2. Mixed hyperlipidemia  Low cholesterol diet  Avoid fried foods    3. Primary hypertension  Controlled at 132/80  Low sodium diet      Will call pt with lab results.          Follow up in about 3 months (around 7/15/2025).

## 2025-04-16 ENCOUNTER — PATIENT MESSAGE (OUTPATIENT)
Dept: FAMILY MEDICINE | Facility: CLINIC | Age: 45
End: 2025-04-16
Payer: COMMERCIAL

## 2025-04-30 ENCOUNTER — OFFICE VISIT (OUTPATIENT)
Dept: DIABETES SERVICES | Facility: CLINIC | Age: 45
End: 2025-04-30
Payer: COMMERCIAL

## 2025-04-30 VITALS
OXYGEN SATURATION: 97 % | WEIGHT: 282.38 LBS | RESPIRATION RATE: 18 BRPM | HEART RATE: 91 BPM | DIASTOLIC BLOOD PRESSURE: 91 MMHG | SYSTOLIC BLOOD PRESSURE: 142 MMHG | BODY MASS INDEX: 45.38 KG/M2 | HEIGHT: 66 IN

## 2025-04-30 DIAGNOSIS — E66.01 CLASS 3 SEVERE OBESITY WITH BODY MASS INDEX (BMI) OF 45.0 TO 49.9 IN ADULT, UNSPECIFIED OBESITY TYPE, UNSPECIFIED WHETHER SERIOUS COMORBIDITY PRESENT: ICD-10-CM

## 2025-04-30 DIAGNOSIS — F32.A DEPRESSION, UNSPECIFIED DEPRESSION TYPE: ICD-10-CM

## 2025-04-30 DIAGNOSIS — I10 PRIMARY HYPERTENSION: ICD-10-CM

## 2025-04-30 DIAGNOSIS — Z79.4 TYPE 2 DIABETES MELLITUS WITH HYPERGLYCEMIA, WITH LONG-TERM CURRENT USE OF INSULIN: Primary | ICD-10-CM

## 2025-04-30 DIAGNOSIS — E66.813 CLASS 3 SEVERE OBESITY WITH BODY MASS INDEX (BMI) OF 45.0 TO 49.9 IN ADULT, UNSPECIFIED OBESITY TYPE, UNSPECIFIED WHETHER SERIOUS COMORBIDITY PRESENT: ICD-10-CM

## 2025-04-30 DIAGNOSIS — E78.2 MIXED HYPERLIPIDEMIA: ICD-10-CM

## 2025-04-30 DIAGNOSIS — E11.65 TYPE 2 DIABETES MELLITUS WITH HYPERGLYCEMIA, WITH LONG-TERM CURRENT USE OF INSULIN: Primary | ICD-10-CM

## 2025-04-30 LAB — GLUCOSE SERPL-MCNC: 184 MG/DL (ref 70–110)

## 2025-04-30 PROCEDURE — 3080F DIAST BP >= 90 MM HG: CPT | Mod: CPTII,,, | Performed by: NURSE PRACTITIONER

## 2025-04-30 PROCEDURE — 99999PBSHW POCT GLUCOSE, HAND-HELD DEVICE: Mod: PBBFAC,,,

## 2025-04-30 PROCEDURE — 99999 PR PBB SHADOW E&M-EST. PATIENT-LVL IV: CPT | Mod: PBBFAC,,, | Performed by: NURSE PRACTITIONER

## 2025-04-30 PROCEDURE — 3066F NEPHROPATHY DOC TX: CPT | Mod: CPTII,,, | Performed by: NURSE PRACTITIONER

## 2025-04-30 PROCEDURE — 1160F RVW MEDS BY RX/DR IN RCRD: CPT | Mod: CPTII,,, | Performed by: NURSE PRACTITIONER

## 2025-04-30 PROCEDURE — 82962 GLUCOSE BLOOD TEST: CPT | Mod: PBBFAC | Performed by: NURSE PRACTITIONER

## 2025-04-30 PROCEDURE — 3077F SYST BP >= 140 MM HG: CPT | Mod: CPTII,,, | Performed by: NURSE PRACTITIONER

## 2025-04-30 PROCEDURE — 99214 OFFICE O/P EST MOD 30 MIN: CPT | Mod: S$PBB,,, | Performed by: NURSE PRACTITIONER

## 2025-04-30 PROCEDURE — 3008F BODY MASS INDEX DOCD: CPT | Mod: CPTII,,, | Performed by: NURSE PRACTITIONER

## 2025-04-30 PROCEDURE — 3046F HEMOGLOBIN A1C LEVEL >9.0%: CPT | Mod: CPTII,,, | Performed by: NURSE PRACTITIONER

## 2025-04-30 PROCEDURE — 99214 OFFICE O/P EST MOD 30 MIN: CPT | Mod: PBBFAC | Performed by: NURSE PRACTITIONER

## 2025-04-30 PROCEDURE — 3060F POS MICROALBUMINURIA REV: CPT | Mod: CPTII,,, | Performed by: NURSE PRACTITIONER

## 2025-04-30 PROCEDURE — 1159F MED LIST DOCD IN RCRD: CPT | Mod: CPTII,,, | Performed by: NURSE PRACTITIONER

## 2025-04-30 PROCEDURE — 4010F ACE/ARB THERAPY RXD/TAKEN: CPT | Mod: CPTII,,, | Performed by: NURSE PRACTITIONER

## 2025-04-30 RX ORDER — INSULIN DEGLUDEC 100 U/ML
20 INJECTION, SOLUTION SUBCUTANEOUS 2 TIMES DAILY
Qty: 12 ML | Refills: 5 | Status: SHIPPED | OUTPATIENT
Start: 2025-04-30 | End: 2025-10-27

## 2025-04-30 RX ORDER — SEMAGLUTIDE 2.68 MG/ML
2 INJECTION, SOLUTION SUBCUTANEOUS
Qty: 3 ML | Refills: 11 | Status: SHIPPED | OUTPATIENT
Start: 2025-04-30 | End: 2026-04-30

## 2025-04-30 RX ORDER — DAPAGLIFLOZIN 10 MG/1
10 TABLET, FILM COATED ORAL DAILY
Qty: 90 TABLET | Refills: 1 | Status: SHIPPED | OUTPATIENT
Start: 2025-04-30 | End: 2025-10-27

## 2025-04-30 NOTE — PROGRESS NOTES
Subjective:         Patient ID: Ashly Trent is a 44 y.o. female.  Patient's current PCP is Rosaura Manzanares FNP.     Chief Complaint: Diabetes Mellitus (Patient is here for a 3 month follow up.  Patient will receive a blood glucose check. She checks her sugar once daily. She has no complaints nor concerns. )    HPI  Ashly Trent is a 44 y.o. Other female presenting for a follow up for diabetes. Patient has been diagnosed with type 2 diabetes for several years.  Received diabetes education: yes     CURRENT DM MEDICATIONS:   Diabetes Medications              dapagliflozin propanediol (FARXIGA) 10 mg tablet Take 1 tablet (10 mg total) by mouth once daily.    insulin degludec (TRESIBA FLEXTOUCH U-100) 100 unit/mL (3 mL) insulin pen Inject 20 Units into the skin every evening.    semaglutide (OZEMPIC) 1 mg/dose (4 mg/3 mL) Inject 1 mg into the skin every 7 days.        32 units nightly       Past failed treatment include:     Blood glucose testing is performed regularly. Patient is testing 1 times per day.  Meter:   Preferred lab:    Any episodes of hypoglycemia? no     Complications related to diabetes: peripheral neuropathy and cardiovascular disease    Her blood sugar in the clinic today was:   Lab Results   Component Value Date    POCGLU 184 (A) 04/30/2025       Ashly Trent presents today for follow up visit to discuss diabetes management.   She denies any complaints at present. She states she is tolerating her medications without problems. She denies further yeast infections.   She does endorse that she is eating better and working out.     Discussed diet, exercise, goal A1C, glucose ranges, and medication changes.     Current diet: diabetic diet with splurges   Activity Level: light     Lab Results   Component Value Date    HGBA1C 9.6 (H) 04/15/2025    HGBA1C 10.2 (H) 01/13/2025    HGBA1C 11.4 (H) 10/11/2024       STANDARDS OF CARE  Diabetes Management Status    Statin: Taking  ACE/ARB:  "Taking    Screening or Prevention Patient's value Goal Complete/Controlled?   HgA1C Testing and Control   Lab Results   Component Value Date    HGBA1C 9.6 (H) 04/15/2025      Annually/Less than 8% No   Lipid profile : 01/13/2025 Annually Yes   LDL control Lab Results   Component Value Date    LDLCALC 57 01/13/2025    Annually/Less than 100 mg/dl  Yes   Nephropathy screening Lab Results   Component Value Date    LABMICR 42.2 (H) 01/13/2025     Lab Results   Component Value Date    PROTEINUA Negative 06/16/2024     No results found for: "UTPCR"   Annually Yes   Blood pressure BP Readings from Last 1 Encounters:   04/30/25 (!) 142/91    Less than 140/90 No   Dilated retinal exam : 08/14/2024 Annually Yes   Foot exam   : 07/12/2024 Annually Yes          Labs reviewed and are noted below.    Lab Results   Component Value Date    WBC 8.42 01/13/2025    HGB 14.7 01/13/2025    HCT 47.4 (H) 01/13/2025     01/13/2025    CHOL 129 01/13/2025    TRIG 62 01/13/2025    HDL 60 01/13/2025    LDLCALC 57 01/13/2025    ALT 18 01/13/2025    AST 26 01/13/2025     01/13/2025    K 4.0 01/13/2025     01/13/2025    ANIONGAP 15 01/13/2025    CREATININE 0.91 01/13/2025    ESTGFRAFRICA 98 09/22/2021    EGFRNONAA 63 02/17/2022    BUN 11 01/13/2025    CO2 26 01/13/2025    TSH 2.075 01/13/2025    INR 1.04 09/12/2024     (H) 01/13/2025    MICROALBUR 2.7 01/13/2025     Lab Results   Component Value Date    TSH 2.075 01/13/2025    CALCIUM 9.3 01/13/2025     No results found for: "CPEPTIDE"  No results found for: "GLUTAMICACID"  Glucose   Date Value Ref Range Status   01/13/2025 207 (H) 74 - 100 mg/dL Final     Anion Gap   Date Value Ref Range Status   01/13/2025 15 7 - 16 mmol/L Final     eGFR    Date Value Ref Range Status   09/22/2021 98 >=60 mL/min/1.73m² Final     eGFR   Date Value Ref Range Status   02/17/2022 63 >=60 mL/min/1.73m² Final       The following portions of the patient's history were reviewed " and updated as appropriate: allergies, current medications, past family history, past medical history, past social history, past surgical history, and problem list.    Review of patient's allergies indicates:   Allergen Reactions    Amoxicillin Rash     Social History[1]  Past Medical History:   Diagnosis Date    Acid reflux     Diabetes mellitus, type 2     HTN (hypertension)     Hyperlipidemia     Migraine with aura     Obesity        REVIEW OF SYSTEMS:  Eyes No history of DR.  Cardiovascular: History of HTN and HLD   GI: Denies nausea,vomiting,constipation,or diarrhea.  : Denies dysuria.  SKIN: Denies rashes and lesions.  Neuro: Neuropathy.  PSYCH: No tobacco use.  ENDO: See HPI.        Objective:      Vitals:    04/30/25 0816   BP: (!) 142/91   Pulse: 91   Resp: 18     RESPIRATORY: No respiratory distress  NEUROLOGIC: Cranial nerves II-XII grossly intact.   PSYCHIATRIC: Alert & oriented x3. Normal mood and affect.  FOOT EXAMINATION: pulses present with no edema   Assessment:       1. Type 2 diabetes mellitus with hyperglycemia, with long-term current use of insulin    2. Depression, unspecified depression type    3. Primary hypertension    4. Mixed hyperlipidemia    5. Class 3 severe obesity with body mass index (BMI) of 45.0 to 49.9 in adult, unspecified obesity type, unspecified whether serious comorbidity present        Plan:   Type 2 diabetes mellitus with hyperglycemia, with long-term current use of insulin  -     POCT Glucose, Hand-Held Device  -     semaglutide (OZEMPIC) 2 mg/dose (8 mg/3 mL) PnIj; Inject 2 mg into the skin every 7 days.  Dispense: 3 mL; Refill: 11  -     dapagliflozin propanediol (FARXIGA) 10 mg tablet; Take 1 tablet (10 mg total) by mouth once daily.  Dispense: 90 tablet; Refill: 1  -     insulin degludec (TRESIBA FLEXTOUCH U-100) 100 unit/mL (3 mL) insulin pen; Inject 20 Units into the skin 2 (two) times a day.  Dispense: 12 mL; Refill: 5  - discussed diet, exercise, goal A1C  -  "discussed medication changes     Depression, unspecified depression type  - noted     Primary hypertension  - noted   - BP slightly elevated during OV  - instructed to check and keep log for next two weeks     Mixed hyperlipidemia  - noted     Class 3 severe obesity with body mass index (BMI) of 45.0 to 49.9 in adult, unspecified obesity type, unspecified whether serious comorbidity present  - noted  - complicates all aspects of care       - Follow up: 3 months      Portions of this note may have been created with voice recognition software. Occasional "wrong-word" or "sound-a-like" substitutions may have occurred due to the inherent limitations of voice recognition software. Please, read the note carefully and recognize, using context, where substitutions have occurred.         Maggi DENNISON/IGNACIO  [facility]          [1]   Social History  Socioeconomic History    Marital status: Single   Tobacco Use    Smoking status: Never    Smokeless tobacco: Never   Substance and Sexual Activity    Alcohol use: Yes    Drug use: Not Currently    Sexual activity: Yes     Social Drivers of Health     Financial Resource Strain: Low Risk  (4/9/2025)    Received from [facility]    Overall Financial Resource Strain (CARDIA)     Difficulty of Paying Living Expenses: Not hard at all   Recent Concern: Financial Resource Strain - High Risk (4/8/2025)    Overall Financial Resource Strain (CARDIA)     Difficulty of Paying Living Expenses: Very hard   Food Insecurity: No Food Insecurity (4/9/2025)    Received from [facility]    Hunger Vital Sign     Worried About Running Out of Food in the Last Year: Never true     Ran Out of Food in the Last Year: Never true   Transportation Needs: No Transportation Needs (4/9/2025)    Received from [facility]    PRAPARE - Transportation     Lack of Transportation (Medical): No     Lack " of Transportation (Non-Medical): No   Physical Activity: Sufficiently Active (4/9/2025)    Received from [facility]    Exercise Vital Sign     Days of Exercise per Week: 6 days     Minutes of Exercise per Session: 130 min   Recent Concern: Physical Activity - Insufficiently Active (4/8/2025)    Exercise Vital Sign     Days of Exercise per Week: 2 days     Minutes of Exercise per Session: 10 min   Stress: No Stress Concern Present (4/9/2025)    Received from [facility]    [employer] - Occupational Stress Questionnaire     Feeling of Stress : Not at all   Housing Stability: Low Risk  (4/9/2025)    Received from [facility]    Housing Stability Vital Sign     Unable to Pay for Housing in the Last Year: No     Number of Times Moved in the Last Year: 1     Homeless in the Last Year: No

## 2025-04-30 NOTE — PATIENT INSTRUCTIONS
-- Medications adjusted for today's visit include:    Increase your ozempic to 2mg weekly   Increase your tresiba to 20 units twice daily goal glucose is less than 120   Continue your farxiga     -- Limit carbs to no more than 30-45 grams with each meal. Never eat carbs by themselves, always add protein. Make snacks low carb or non-carb only.    -- Continue checking blood sugar 3 times daily: Fasting blood sugar and vary your next 2 readings: Before lunch, before supper, 2 hours after any meal, or bedtime.   -Blood sugar goals should be a fasting blood sugar between , and no blood sugars throughout the day over 180 is good, less than 160 better, less than 140 perfect.    --Carry glucose tablets/soft peppermints/regular juice or Coke product with you at all times to treat a low blood sugar episode (less than 70). If your blood sugar is between 50-70, Chew 4 tablets or drink 1/2 cup of juice or regular Coke product. If your blood sugar is below 50, double the treatment. Re-check blood sugar in 15 minutes. If still low, repeat this. Always call the clinic to give an update for any low blood sugar episodes.    --Exercise as tolerated: Goal 30 minutes/day, 5 days/week. Start slowly and increase as tolerated.    --Follow-up for your next visit in 3 months     --Please either drop off, fax, or MyChart your readings to me as needed.

## 2025-05-07 ENCOUNTER — TELEPHONE (OUTPATIENT)
Dept: DIABETES SERVICES | Facility: CLINIC | Age: 45
End: 2025-05-07
Payer: COMMERCIAL

## 2025-05-07 DIAGNOSIS — I10 ESSENTIAL HYPERTENSION, BENIGN: ICD-10-CM

## 2025-05-07 RX ORDER — AMLODIPINE BESYLATE 5 MG/1
5 TABLET ORAL DAILY
Qty: 90 TABLET | Refills: 1 | Status: SHIPPED | OUTPATIENT
Start: 2025-05-07

## 2025-05-07 NOTE — TELEPHONE ENCOUNTER
I attempted to contact patient regarding her fasting am glucose since she last saw Maggi Gonsalez on 4/30/25. Patient did not answer, number unavailable.

## 2025-07-30 ENCOUNTER — OFFICE VISIT (OUTPATIENT)
Dept: DIABETES SERVICES | Facility: CLINIC | Age: 45
End: 2025-07-30
Payer: COMMERCIAL

## 2025-07-30 VITALS
DIASTOLIC BLOOD PRESSURE: 68 MMHG | RESPIRATION RATE: 18 BRPM | HEART RATE: 97 BPM | OXYGEN SATURATION: 95 % | SYSTOLIC BLOOD PRESSURE: 107 MMHG | BODY MASS INDEX: 44.87 KG/M2 | WEIGHT: 279.19 LBS | HEIGHT: 66 IN

## 2025-07-30 DIAGNOSIS — Z79.4 TYPE 2 DIABETES MELLITUS WITH HYPERGLYCEMIA, WITH LONG-TERM CURRENT USE OF INSULIN: Primary | ICD-10-CM

## 2025-07-30 DIAGNOSIS — E66.813 CLASS 3 SEVERE OBESITY WITH BODY MASS INDEX (BMI) OF 45.0 TO 49.9 IN ADULT, UNSPECIFIED OBESITY TYPE, UNSPECIFIED WHETHER SERIOUS COMORBIDITY PRESENT: ICD-10-CM

## 2025-07-30 DIAGNOSIS — E78.2 MIXED HYPERLIPIDEMIA: ICD-10-CM

## 2025-07-30 DIAGNOSIS — I10 PRIMARY HYPERTENSION: ICD-10-CM

## 2025-07-30 DIAGNOSIS — E11.65 TYPE 2 DIABETES MELLITUS WITH HYPERGLYCEMIA, WITH LONG-TERM CURRENT USE OF INSULIN: Primary | ICD-10-CM

## 2025-07-30 LAB
GLUCOSE SERPL-MCNC: 181 MG/DL (ref 70–110)
HBA1C MFR BLD: 8.6 % (ref 4.5–6.6)

## 2025-07-30 PROCEDURE — 3052F HG A1C>EQUAL 8.0%<EQUAL 9.0%: CPT | Mod: CPTII,,, | Performed by: NURSE PRACTITIONER

## 2025-07-30 PROCEDURE — 1159F MED LIST DOCD IN RCRD: CPT | Mod: CPTII,,, | Performed by: NURSE PRACTITIONER

## 2025-07-30 PROCEDURE — 83036 HEMOGLOBIN GLYCOSYLATED A1C: CPT | Mod: PBBFAC | Performed by: NURSE PRACTITIONER

## 2025-07-30 PROCEDURE — 3066F NEPHROPATHY DOC TX: CPT | Mod: CPTII,,, | Performed by: NURSE PRACTITIONER

## 2025-07-30 PROCEDURE — 3078F DIAST BP <80 MM HG: CPT | Mod: CPTII,,, | Performed by: NURSE PRACTITIONER

## 2025-07-30 PROCEDURE — 1160F RVW MEDS BY RX/DR IN RCRD: CPT | Mod: CPTII,,, | Performed by: NURSE PRACTITIONER

## 2025-07-30 PROCEDURE — 99214 OFFICE O/P EST MOD 30 MIN: CPT | Mod: PBBFAC | Performed by: NURSE PRACTITIONER

## 2025-07-30 PROCEDURE — 99999PBSHW POCT GLUCOSE, HAND-HELD DEVICE: Mod: PBBFAC,,,

## 2025-07-30 PROCEDURE — 3060F POS MICROALBUMINURIA REV: CPT | Mod: CPTII,,, | Performed by: NURSE PRACTITIONER

## 2025-07-30 PROCEDURE — 4010F ACE/ARB THERAPY RXD/TAKEN: CPT | Mod: CPTII,,, | Performed by: NURSE PRACTITIONER

## 2025-07-30 PROCEDURE — 3008F BODY MASS INDEX DOCD: CPT | Mod: CPTII,,, | Performed by: NURSE PRACTITIONER

## 2025-07-30 PROCEDURE — 99999PBSHW POCT HEMOGLOBIN A1C: Mod: PBBFAC,,,

## 2025-07-30 PROCEDURE — 3074F SYST BP LT 130 MM HG: CPT | Mod: CPTII,,, | Performed by: NURSE PRACTITIONER

## 2025-07-30 PROCEDURE — 99999 PR PBB SHADOW E&M-EST. PATIENT-LVL IV: CPT | Mod: PBBFAC,,, | Performed by: NURSE PRACTITIONER

## 2025-07-30 PROCEDURE — 99214 OFFICE O/P EST MOD 30 MIN: CPT | Mod: S$PBB,,, | Performed by: NURSE PRACTITIONER

## 2025-07-30 PROCEDURE — 82962 GLUCOSE BLOOD TEST: CPT | Mod: PBBFAC | Performed by: NURSE PRACTITIONER

## 2025-07-30 RX ORDER — INSULIN DEGLUDEC 100 U/ML
25 INJECTION, SOLUTION SUBCUTANEOUS 2 TIMES DAILY
Qty: 15 ML | Refills: 5 | Status: SHIPPED | OUTPATIENT
Start: 2025-07-30 | End: 2026-01-26

## 2025-07-30 RX ORDER — DEXTROSE 4 G
TABLET,CHEWABLE ORAL
Qty: 1 EACH | Refills: 1 | Status: SHIPPED | OUTPATIENT
Start: 2025-07-30

## 2025-07-30 RX ORDER — PEN NEEDLE, DIABETIC 30 GX3/16"
NEEDLE, DISPOSABLE MISCELLANEOUS
Qty: 200 EACH | Refills: 4 | Status: SHIPPED | OUTPATIENT
Start: 2025-07-30

## 2025-07-30 RX ORDER — SEMAGLUTIDE 2.68 MG/ML
2 INJECTION, SOLUTION SUBCUTANEOUS
Qty: 3 ML | Refills: 11 | Status: SHIPPED | OUTPATIENT
Start: 2025-07-30 | End: 2026-07-30

## 2025-07-30 RX ORDER — GLIPIZIDE 5 MG/1
5 TABLET, FILM COATED, EXTENDED RELEASE ORAL
Qty: 90 TABLET | Refills: 3 | Status: SHIPPED | OUTPATIENT
Start: 2025-07-30 | End: 2026-07-30

## 2025-07-30 RX ORDER — DAPAGLIFLOZIN 10 MG/1
10 TABLET, FILM COATED ORAL DAILY
Qty: 90 TABLET | Refills: 1 | Status: SHIPPED | OUTPATIENT
Start: 2025-07-30 | End: 2026-01-26

## 2025-07-30 RX ORDER — LANCETS 33 GAUGE
EACH MISCELLANEOUS
Qty: 200 EACH | Refills: 4 | Status: SHIPPED | OUTPATIENT
Start: 2025-07-30

## 2025-07-30 NOTE — PROGRESS NOTES
Subjective:         Patient ID: Ashly Trent is a 44 y.o. female.  Patient's current PCP is Rosaura Manzanares FNP.     Chief Complaint: No chief complaint on file.    HPI  Ashly Trent is a 44 y.o. Other female presenting for a follow up for diabetes. Patient has been diagnosed with type 2 diabetes for several years.  Received diabetes education: previously     CURRENT DM MEDICATIONS:   Diabetes Medications              dapagliflozin propanediol (FARXIGA) 10 mg tablet Take 1 tablet (10 mg total) by mouth once daily.    insulin degludec (TRESIBA FLEXTOUCH U-100) 100 unit/mL (3 mL) insulin pen Inject 20 Units into the skin 2 (two) times a day.    semaglutide (OZEMPIC) 2 mg/dose (8 mg/3 mL) PnIj Inject 2 mg into the skin every 7 days.              Past failed treatment include: unsure     Blood glucose testing is performed regularly. Patient is testing 1 times per day.  Meter:   Preferred lab:    Any episodes of hypoglycemia? no     Complications related to diabetes: peripheral neuropathy and cardiovascular disease    Her blood sugar in the clinic today was:   Lab Results   Component Value Date    POCGLU 181 (A) 07/30/2025       Ashly Trent presents today for follow up visit to discuss diabetes management.   She denies any complaints. She remains uncontrolled but has improved. States her fasting glucose in the AM is usually around 170-190.     Discussed diet, exercise, lifestyle changes. Discussed medication and changes to regimen. Discussed goal glucose and A1C readings.     Current diet: typically lower carb choices   Activity Level: sedentary     Lab Results   Component Value Date    HGBA1C 8.6 (A) 07/30/2025    HGBA1C 9.6 (H) 04/15/2025    HGBA1C 10.2 (H) 01/13/2025       STANDARDS OF CARE  Diabetes Management Status    Statin: Taking  ACE/ARB: Taking    Screening or Prevention Patient's value Goal Complete/Controlled?   HgA1C Testing and Control   Lab Results   Component Value Date    HGBA1C 8.6 (A)  "07/30/2025      Annually/Less than 8% No   Lipid profile : 01/13/2025 Annually Yes   LDL control Lab Results   Component Value Date    LDLCALC 57 01/13/2025    Annually/Less than 100 mg/dl  Yes   Nephropathy screening Lab Results   Component Value Date    LABMICR 42.2 (H) 01/13/2025     Lab Results   Component Value Date    PROTEINUA Negative 06/16/2024     No results found for: "UTPCR"   Annually Yes   Blood pressure BP Readings from Last 1 Encounters:   07/30/25 107/68    Less than 140/90 Yes   Dilated retinal exam : 08/14/2024 Annually Yes   Foot exam   : 07/12/2024 Annually No          Labs reviewed and are noted below.    Lab Results   Component Value Date    WBC 8.42 01/13/2025    HGB 14.7 01/13/2025    HCT 47.4 (H) 01/13/2025     01/13/2025    CHOL 129 01/13/2025    TRIG 62 01/13/2025    HDL 60 01/13/2025    LDLCALC 57 01/13/2025    ALT 18 01/13/2025    AST 26 01/13/2025     01/13/2025    K 4.0 01/13/2025     01/13/2025    ANIONGAP 15 01/13/2025    CREATININE 0.91 01/13/2025    ESTGFRAFRICA 98 09/22/2021    EGFRNONAA 63 02/17/2022    BUN 11 01/13/2025    CO2 26 01/13/2025    TSH 2.075 01/13/2025    INR 1.04 09/12/2024     (H) 01/13/2025    MICROALBUR 2.7 01/13/2025     Lab Results   Component Value Date    TSH 2.075 01/13/2025    CALCIUM 9.3 01/13/2025     No results found for: "CPEPTIDE"  No results found for: "GLUTAMICACID"  Glucose   Date Value Ref Range Status   01/13/2025 207 (H) 74 - 100 mg/dL Final     Anion Gap   Date Value Ref Range Status   01/13/2025 15 7 - 16 mmol/L Final     eGFR    Date Value Ref Range Status   09/22/2021 98 >=60 mL/min/1.73m² Final     eGFR   Date Value Ref Range Status   02/17/2022 63 >=60 mL/min/1.73m² Final       The following portions of the patient's history were reviewed and updated as appropriate: allergies, current medications, past family history, past medical history, past social history, past surgical history, and problem " list.    Review of patient's allergies indicates:   Allergen Reactions    Amoxicillin Rash     Social History[1]  Past Medical History:   Diagnosis Date    Acid reflux     Diabetes mellitus, type 2     HTN (hypertension)     Hyperlipidemia     Migraine with aura     Obesity        REVIEW OF SYSTEMS:  Eyes No history of DR.  Cardiovascular: History of HTN and HLD   GI: Denies nausea,vomiting,constipation,or diarrhea.  : Denies dysuria.  SKIN: Denies rashes and lesions.  Neuro: No neuropathy.  PSYCH: No tobacco use.  ENDO: See HPI.        Objective:      Vitals:    07/30/25 0806   BP: 107/68   Pulse: 97   Resp: 18     RESPIRATORY: No respiratory distress  NEUROLOGIC: Cranial nerves II-XII grossly intact.   PSYCHIATRIC: Alert & oriented x3. Normal mood and affect.  FOOT EXAMINATION: Protective Sensation (w/ 10 gram monofilament):  Right: Intact  Left: Intact    Visual Inspection:  Normal -  Bilateral    Pedal Pulses:   Right: Present  Left: Present    Posterior Tibialis Pulses:   Right:Present  Left: Present    Assessment:       1. Type 2 diabetes mellitus with hyperglycemia, with long-term current use of insulin    2. Primary hypertension    3. Mixed hyperlipidemia    4. Class 3 severe obesity with body mass index (BMI) of 45.0 to 49.9 in adult, unspecified obesity type, unspecified whether serious comorbidity present        Plan:   Type 2 diabetes mellitus with hyperglycemia, with long-term current use of insulin  -     Hemoglobin A1C, POCT  -     POCT Glucose, Hand-Held Device  -     semaglutide (OZEMPIC) 2 mg/dose (8 mg/3 mL) PnIj; Inject 2 mg into the skin every 7 days.  Dispense: 3 mL; Refill: 11  -     glipiZIDE 5 MG TR24; Take 1 tablet (5 mg total) by mouth daily with breakfast.  Dispense: 90 tablet; Refill: 3  -     insulin degludec (TRESIBA FLEXTOUCH U-100) 100 unit/mL (3 mL) insulin pen; Inject 25 Units into the skin 2 (two) times a day.  Dispense: 15 mL; Refill: 5  -     pen needle, diabetic (ULTRA-FINE  "PEN NEEDLE) 31 gauge x 5/16" Ndle; Use with your insulin twice daily  Dispense: 200 each; Refill: 4  -     dapagliflozin propanediol (FARXIGA) 10 mg tablet; Take 1 tablet (10 mg total) by mouth once daily.  Dispense: 90 tablet; Refill: 1  -     blood-glucose meter (TRUE METRIX GLUCOSE METER) Misc; Use to check blood sugar 4 times a day  Dispense: 1 each; Refill: 1  -     lancets (TRUEPLUS LANCETS) 33 gauge Misc; Use to check blood sugar 4 times a day  Dispense: 200 each; Refill: 4  -     blood sugar diagnostic Strp; Use to check blood sugar 4 times a day  Dispense: 200 each; Refill: 4    Primary hypertension  - noted  - stable during visit     Mixed hyperlipidemia  - noted     Class 3 severe obesity with body mass index (BMI) of 45.0 to 49.9 in adult, unspecified obesity type, unspecified whether serious comorbidity present  - noted  - complicates all aspects of care       - Follow up: 3 months      Portions of this note may have been created with voice recognition software. Occasional "wrong-word" or "sound-a-like" substitutions may have occurred due to the inherent limitations of voice recognition software. Please, read the note carefully and recognize, using context, where substitutions have occurred.         Maggi CHANP/IGNACIO  [facility]          [1]   Social History  Socioeconomic History    Marital status: Single   Tobacco Use    Smoking status: Never    Smokeless tobacco: Never   Substance and Sexual Activity    Alcohol use: Yes    Drug use: Not Currently    Sexual activity: Yes     Social Drivers of Health     Financial Resource Strain: Low Risk  (7/1/2025)    Received from [facility]    Overall Financial Resource Strain (CARDIA)     Difficulty of Paying Living Expenses: Not hard at all   Recent Concern: Financial Resource Strain - High Risk (4/8/2025)    Overall Financial Resource Strain (CARDIA)     Difficulty of Paying Living Expenses: Very " hard   Food Insecurity: No Food Insecurity (7/1/2025)    Received from [facility]    Hunger Vital Sign     Worried About Running Out of Food in the Last Year: Never true     Ran Out of Food in the Last Year: Never true   Transportation Needs: No Transportation Needs (7/1/2025)    Received from [facility]    PRAPARE - Transportation     Lack of Transportation (Medical): No     Lack of Transportation (Non-Medical): No   Physical Activity: Sufficiently Active (7/1/2025)    Received from [facility]    Exercise Vital Sign     Days of Exercise per Week: 6 days     Minutes of Exercise per Session: 120 min   Recent Concern: Physical Activity - Insufficiently Active (4/8/2025)    Exercise Vital Sign     Days of Exercise per Week: 2 days     Minutes of Exercise per Session: 10 min   Stress: No Stress Concern Present (7/1/2025)    Received from [facility]    [employer] [place] [employer] - Occupational Stress Questionnaire     Feeling of Stress : Not at all   Housing Stability: Low Risk  (7/1/2025)    Received from [facility]    Housing Stability Vital Sign     Unable to Pay for Housing in the Last Year: No     Number of Times Moved in the Last Year: 1     Homeless in the Last Year: No

## 2025-07-30 NOTE — PATIENT INSTRUCTIONS
-- Medications adjusted for today's visit include:    Continue your farxiga, ozempic    Increase your tresiba to 25 units twice daily     Start taking glipizide daily     -- Limit carbs to no more than 30-45 grams with each meal. Never eat carbs by themselves, always add protein. Make snacks low carb or non-carb only.    -- Continue checking blood sugar 3 times daily: Fasting blood sugar and vary your next 2 readings: Before lunch, before supper, 2 hours after any meal, or bedtime.     -Blood sugar goals should be a fasting blood sugar between , and no blood sugars throughout the day over 180 is good, less than 160 better, less than 140 perfect.    --Carry glucose tablets/soft peppermints/regular juice or Coke product with you at all times to treat a low blood sugar episode (less than 70). If your blood sugar is between 50-70, Chew 4 tablets or drink 1/2 cup of juice or regular Coke product. If your blood sugar is below 50, double the treatment. Re-check blood sugar in 15 minutes. If still low, repeat this. Always call the clinic to give an update for any low blood sugar episodes.    --Exercise as tolerated: Goal 30 minutes/day, 5 days/week. Start slowly and increase as tolerated.    --Follow-up for your next visit in 3 months     --Please either drop off, fax, or MyChart your readings to me as needed.

## (undated) DEVICE — CATH IV INTROCAN 22G X 1

## (undated) DEVICE — APPLICATOR CHLORAPREP ORN 26ML

## (undated) DEVICE — KIT IV START

## (undated) DEVICE — TRAY NERVE BLOCK UNIV 10/CA

## (undated) DEVICE — SET EXT STD BORE CATH 7.6IN

## (undated) DEVICE — SLIPPER FALL PREV YEL BARIAT

## (undated) DEVICE — SYR EPILOR LUER-LOK LOR 7ML

## (undated) DEVICE — NDL TUOHY EPIDURAL 20GX3.5IN

## (undated) DEVICE — GLOVE SENSICARE PI SURG 6.5